# Patient Record
Sex: FEMALE | Race: WHITE | NOT HISPANIC OR LATINO | Employment: UNEMPLOYED | URBAN - NONMETROPOLITAN AREA
[De-identification: names, ages, dates, MRNs, and addresses within clinical notes are randomized per-mention and may not be internally consistent; named-entity substitution may affect disease eponyms.]

---

## 2017-03-14 ENCOUNTER — HOSPITAL ENCOUNTER (EMERGENCY)
Facility: HOSPITAL | Age: 62
Discharge: HOME | End: 2017-03-15
Attending: EMERGENCY MEDICINE | Admitting: EMERGENCY MEDICINE

## 2017-03-14 ENCOUNTER — APPOINTMENT (OUTPATIENT)
Dept: CT IMAGING | Facility: HOSPITAL | Age: 62
End: 2017-03-14

## 2017-03-14 ENCOUNTER — APPOINTMENT (OUTPATIENT)
Dept: GENERAL RADIOLOGY | Facility: HOSPITAL | Age: 62
End: 2017-03-14

## 2017-03-14 DIAGNOSIS — F41.9 ANXIETY: Primary | ICD-10-CM

## 2017-03-14 DIAGNOSIS — R21 RASH AND NONSPECIFIC SKIN ERUPTION: ICD-10-CM

## 2017-03-14 LAB
ALBUMIN SERPL-MCNC: 3.5 G/DL (ref 3.4–4.8)
ALBUMIN/GLOB SERPL: 0.8 G/DL (ref 1.5–2.5)
ALP SERPL-CCNC: 89 U/L (ref 35–104)
ALT SERPL W P-5'-P-CCNC: 21 U/L (ref 10–36)
AMMONIA BLD-SCNC: 19 UMOL/L (ref 11–51)
AMYLASE SERPL-CCNC: 80 U/L (ref 28–100)
ANION GAP SERPL CALCULATED.3IONS-SCNC: 2 MMOL/L (ref 3.6–11.2)
APTT PPP: 26.3 SECONDS (ref 24.4–31)
AST SERPL-CCNC: 37 U/L (ref 10–30)
BASOPHILS # BLD AUTO: 0.02 10*3/MM3 (ref 0–0.3)
BASOPHILS NFR BLD AUTO: 0.3 % (ref 0–2)
BILIRUB SERPL-MCNC: 0.5 MG/DL (ref 0.2–1.8)
BNP SERPL-MCNC: 57 PG/ML (ref 0–100)
BUN BLD-MCNC: 29 MG/DL (ref 7–21)
BUN/CREAT SERPL: 23.8 (ref 7–25)
CALCIUM SPEC-SCNC: 9.4 MG/DL (ref 7.7–10)
CHLORIDE SERPL-SCNC: 96 MMOL/L (ref 99–112)
CK MB SERPL-CCNC: 1.53 NG/ML (ref 0–5)
CK MB SERPL-CCNC: 1.54 NG/ML (ref 0–5)
CK MB SERPL-RTO: 3.8 % (ref 0–3)
CK MB SERPL-RTO: 4.4 % (ref 0–3)
CK SERPL-CCNC: 35 U/L (ref 24–173)
CK SERPL-CCNC: 40 U/L (ref 24–173)
CO2 SERPL-SCNC: 31 MMOL/L (ref 24.3–31.9)
CREAT BLD-MCNC: 1.22 MG/DL (ref 0.43–1.29)
DEPRECATED RDW RBC AUTO: 44.2 FL (ref 37–54)
EOSINOPHIL # BLD AUTO: 0.73 10*3/MM3 (ref 0–0.7)
EOSINOPHIL NFR BLD AUTO: 9.3 % (ref 0–5)
ERYTHROCYTE [DISTWIDTH] IN BLOOD BY AUTOMATED COUNT: 14.5 % (ref 11.5–14.5)
FLUAV AG NPH QL: NEGATIVE
FLUBV AG NPH QL IA: NEGATIVE
GFR SERPL CREATININE-BSD FRML MDRD: 45 ML/MIN/1.73
GLOBULIN UR ELPH-MCNC: 4.2 GM/DL
GLUCOSE BLD-MCNC: 244 MG/DL (ref 70–110)
HCT VFR BLD AUTO: 28.8 % (ref 37–47)
HGB BLD-MCNC: 9.8 G/DL (ref 12–16)
IMM GRANULOCYTES # BLD: 0.03 10*3/MM3 (ref 0–0.03)
IMM GRANULOCYTES NFR BLD: 0.4 % (ref 0–0.5)
INR PPP: 0.91 (ref 0.8–1.1)
LIPASE SERPL-CCNC: 88 U/L (ref 13–60)
LYMPHOCYTES # BLD AUTO: 1.4 10*3/MM3 (ref 1–3)
LYMPHOCYTES NFR BLD AUTO: 17.8 % (ref 21–51)
MCH RBC QN AUTO: 29.2 PG (ref 27–33)
MCHC RBC AUTO-ENTMCNC: 34 G/DL (ref 33–37)
MCV RBC AUTO: 85.7 FL (ref 80–94)
MONOCYTES # BLD AUTO: 0.72 10*3/MM3 (ref 0.1–0.9)
MONOCYTES NFR BLD AUTO: 9.1 % (ref 0–10)
MYOGLOBIN SERPL-MCNC: 43 NG/ML (ref 0–109)
NEUTROPHILS # BLD AUTO: 4.98 10*3/MM3 (ref 1.4–6.5)
NEUTROPHILS NFR BLD AUTO: 63.1 % (ref 30–70)
OSMOLALITY SERPL CALC.SUM OF ELEC: 272.9 MOSM/KG (ref 273–305)
PLATELET # BLD AUTO: 151 10*3/MM3 (ref 130–400)
PMV BLD AUTO: 9.6 FL (ref 6–10)
POTASSIUM BLD-SCNC: 3.8 MMOL/L (ref 3.5–5.3)
PROT SERPL-MCNC: 7.7 G/DL (ref 6–8)
PROTHROMBIN TIME: 10 SECONDS (ref 9.8–11.9)
RBC # BLD AUTO: 3.36 10*6/MM3 (ref 4.2–5.4)
SODIUM BLD-SCNC: 129 MMOL/L (ref 135–153)
TROPONIN I SERPL-MCNC: <0.006 NG/ML
TROPONIN I SERPL-MCNC: <0.006 NG/ML
WBC NRBC COR # BLD: 7.88 10*3/MM3 (ref 4.5–12.5)

## 2017-03-14 PROCEDURE — 82553 CREATINE MB FRACTION: CPT | Performed by: PHYSICIAN ASSISTANT

## 2017-03-14 PROCEDURE — 83690 ASSAY OF LIPASE: CPT | Performed by: PHYSICIAN ASSISTANT

## 2017-03-14 PROCEDURE — 80053 COMPREHEN METABOLIC PANEL: CPT | Performed by: EMERGENCY MEDICINE

## 2017-03-14 PROCEDURE — 0 IOPAMIDOL 61 % SOLUTION: Performed by: EMERGENCY MEDICINE

## 2017-03-14 PROCEDURE — 84484 ASSAY OF TROPONIN QUANT: CPT | Performed by: PHYSICIAN ASSISTANT

## 2017-03-14 PROCEDURE — 25010000002 MORPHINE PER 10 MG: Performed by: EMERGENCY MEDICINE

## 2017-03-14 PROCEDURE — 93010 ELECTROCARDIOGRAM REPORT: CPT | Performed by: INTERNAL MEDICINE

## 2017-03-14 PROCEDURE — 74177 CT ABD & PELVIS W/CONTRAST: CPT | Performed by: RADIOLOGY

## 2017-03-14 PROCEDURE — 99284 EMERGENCY DEPT VISIT MOD MDM: CPT

## 2017-03-14 PROCEDURE — 71020 HC CHEST PA AND LATERAL: CPT

## 2017-03-14 PROCEDURE — 87804 INFLUENZA ASSAY W/OPTIC: CPT | Performed by: PHYSICIAN ASSISTANT

## 2017-03-14 PROCEDURE — 83874 ASSAY OF MYOGLOBIN: CPT | Performed by: PHYSICIAN ASSISTANT

## 2017-03-14 PROCEDURE — 85730 THROMBOPLASTIN TIME PARTIAL: CPT | Performed by: PHYSICIAN ASSISTANT

## 2017-03-14 PROCEDURE — 96361 HYDRATE IV INFUSION ADD-ON: CPT

## 2017-03-14 PROCEDURE — 83880 ASSAY OF NATRIURETIC PEPTIDE: CPT | Performed by: EMERGENCY MEDICINE

## 2017-03-14 PROCEDURE — 85025 COMPLETE CBC W/AUTO DIFF WBC: CPT | Performed by: EMERGENCY MEDICINE

## 2017-03-14 PROCEDURE — 82140 ASSAY OF AMMONIA: CPT | Performed by: PHYSICIAN ASSISTANT

## 2017-03-14 PROCEDURE — 96374 THER/PROPH/DIAG INJ IV PUSH: CPT

## 2017-03-14 PROCEDURE — 84484 ASSAY OF TROPONIN QUANT: CPT | Performed by: EMERGENCY MEDICINE

## 2017-03-14 PROCEDURE — 71020 XR CHEST 2 VW: CPT | Performed by: RADIOLOGY

## 2017-03-14 PROCEDURE — 85610 PROTHROMBIN TIME: CPT | Performed by: PHYSICIAN ASSISTANT

## 2017-03-14 PROCEDURE — 74177 CT ABD & PELVIS W/CONTRAST: CPT

## 2017-03-14 PROCEDURE — 82150 ASSAY OF AMYLASE: CPT | Performed by: PHYSICIAN ASSISTANT

## 2017-03-14 PROCEDURE — 93005 ELECTROCARDIOGRAM TRACING: CPT

## 2017-03-14 PROCEDURE — 82550 ASSAY OF CK (CPK): CPT | Performed by: PHYSICIAN ASSISTANT

## 2017-03-14 RX ORDER — SODIUM CHLORIDE 0.9 % (FLUSH) 0.9 %
10 SYRINGE (ML) INJECTION AS NEEDED
Status: DISCONTINUED | OUTPATIENT
Start: 2017-03-14 | End: 2017-03-15 | Stop reason: HOSPADM

## 2017-03-14 RX ADMIN — IOPAMIDOL 100 ML: 612 INJECTION, SOLUTION INTRAVENOUS at 23:12

## 2017-03-14 RX ADMIN — MORPHINE SULFATE 4 MG: 4 INJECTION, SOLUTION INTRAMUSCULAR; INTRAVENOUS at 22:27

## 2017-03-14 RX ADMIN — SODIUM CHLORIDE 500 ML: 9 INJECTION, SOLUTION INTRAVENOUS at 20:53

## 2017-03-14 NOTE — ED PROVIDER NOTES
"Subjective   HPI Comments: Patient presents to the ED with complaints of being \"sick\" since yesterday. Patient states she is experiencing shortness of breath.  Patient states she had pneumonia twice since November of last year and just got out of the nursing home. Patient states that while in the nursing home she got an itchy rash all over her body related to their detergent.  Patient states the rash is still bothering her.  Patient also states that she has hepatitis and is in liver failure.  Patient states her abdomen has also been swelling.  She reports nausea, but no vomiting or diarrhea.  Patient states since yesterday she has been feeling weak with body aches.  Patient reports increased anxiety. She denies SI/HI, AVH.     Patient is a 61 y.o. female presenting with shortness of breath.   History provided by:  Patient   used: No    Shortness of Breath   Severity:  Mild  Onset quality:  Gradual  Duration:  2 days  Timing:  Constant  Progression:  Worsening  Chronicity:  New  Relieved by:  Nothing  Worsened by:  Nothing  Ineffective treatments:  None tried  Associated symptoms: rash        Review of Systems   Constitutional: Negative.    HENT: Negative.    Eyes: Negative.    Respiratory: Positive for shortness of breath.    Cardiovascular: Negative.    Gastrointestinal: Negative.    Endocrine: Negative.    Genitourinary: Negative.    Musculoskeletal: Negative.    Skin: Positive for rash.   Allergic/Immunologic: Negative.    Neurological: Negative.    Hematological: Negative.    Psychiatric/Behavioral: Negative.    All other systems reviewed and are negative.      Past Medical History   Diagnosis Date   • CAD (coronary artery disease)    • Cardiac disorder    • Diabetes mellitus    • Glaucoma    • Hypertension    • Infectious viral hepatitis        No Known Allergies    Past Surgical History   Procedure Laterality Date   • Hernia repair     • Hysterectomy     • Kidney surgery     • Abdominal " wall mesh  removal         Family History   Problem Relation Age of Onset   • Heart disease Other    • Diabetes Other    • Hypertension Other    • Cancer Other        Social History     Social History   • Marital status:      Spouse name: N/A   • Number of children: N/A   • Years of education: N/A     Social History Main Topics   • Smoking status: Current Every Day Smoker   • Smokeless tobacco: None   • Alcohol use No   • Drug use: No   • Sexual activity: Not Asked     Other Topics Concern   • None     Social History Narrative           Objective   Physical Exam   Constitutional: She is oriented to person, place, and time. She appears well-developed and well-nourished. No distress.   HENT:   Head: Normocephalic and atraumatic.   Right Ear: External ear normal.   Left Ear: External ear normal.   Nose: Nose normal.   Mouth/Throat: Oropharynx is clear and moist. No oropharyngeal exudate.   Eyes: EOM are normal. Pupils are equal, round, and reactive to light. Right eye exhibits no discharge. Left eye exhibits no discharge. No scleral icterus.   Neck: Normal range of motion. Neck supple. No JVD present. No tracheal deviation present. No thyromegaly present.   Cardiovascular: Normal rate, regular rhythm, normal heart sounds and intact distal pulses.  Exam reveals no gallop and no friction rub.    No murmur heard.  Pulmonary/Chest: Effort normal and breath sounds normal. No stridor. No respiratory distress. She has no wheezes. She has no rales. She exhibits no tenderness.   Abdominal: Soft. Bowel sounds are normal. She exhibits distension. She exhibits no mass. There is no tenderness. There is no rebound and no guarding. No hernia.   Musculoskeletal: Normal range of motion. She exhibits no edema, tenderness or deformity.   Lymphadenopathy:     She has no cervical adenopathy.   Neurological: She is alert and oriented to person, place, and time. She displays normal reflexes. No cranial nerve deficit. Coordination  normal.   Skin: Skin is warm and dry. Rash noted. She is not diaphoretic. No erythema. No pallor.   Nursing note and vitals reviewed.      Procedures         ED Course  ED Course   Value Comment By Time   ECG 12 Lead 1821- Reviewed by Dr. Wise, rate 74, sinus rhythm, no ischemia  HOLLIE Magaña 03/14 6894    Discussed case with Dr. Ace.  HOLLIE Magaña 03/14 8163    Discussed results with patient. Instructed PCP f/u.  Discussed sx that would warrant immediate return to the ED. Pt states she is feeling better and ready to go home.  HOLLIE Magaña 03/14 234                  MDM    Final diagnoses:   Anxiety   Rash and nonspecific skin eruption            HOLLIE Magaña  03/14/17 6667

## 2017-03-15 VITALS
RESPIRATION RATE: 20 BRPM | OXYGEN SATURATION: 100 % | DIASTOLIC BLOOD PRESSURE: 82 MMHG | HEIGHT: 64 IN | SYSTOLIC BLOOD PRESSURE: 164 MMHG | HEART RATE: 69 BPM | TEMPERATURE: 97.6 F | BODY MASS INDEX: 22.2 KG/M2 | WEIGHT: 130 LBS

## 2017-03-15 LAB
HOLD SPECIMEN: NORMAL
HOLD SPECIMEN: NORMAL
WHOLE BLOOD HOLD SPECIMEN: NORMAL
WHOLE BLOOD HOLD SPECIMEN: NORMAL

## 2017-06-22 ENCOUNTER — TELEPHONE (OUTPATIENT)
Dept: SURGERY | Facility: CLINIC | Age: 62
End: 2017-06-22

## 2017-06-22 NOTE — TELEPHONE ENCOUNTER
Lily called and stated that she was see in [facility] last night 06/21/2017 and they wanted her to follow up with us within 24 hrs. Patient called when we was finishing up with clinic. I spoke with Kelly and she stated that we could see her today that we needed her here within 30 minutes. Pt lives in [place]. Pt stated that she could not do that, she did not have a ride. I told her our next clinic day was on Tuesday and she said that was fine to make her a appointment and she would arrange a ride. I advised her that when she is seen in the ER and they advise her to get into another doctor the next day to try and contact the office that morning so she will have time to arrange a ride.

## 2017-07-28 ENCOUNTER — TRANSCRIBE ORDERS (OUTPATIENT)
Dept: ADMINISTRATIVE | Facility: HOSPITAL | Age: 62
End: 2017-07-28

## 2017-07-28 DIAGNOSIS — Z12.31 VISIT FOR SCREENING MAMMOGRAM: Primary | ICD-10-CM

## 2017-08-29 ENCOUNTER — HOSPITAL ENCOUNTER (EMERGENCY)
Facility: HOSPITAL | Age: 62
Discharge: HOME | End: 2017-08-30
Attending: EMERGENCY MEDICINE | Admitting: EMERGENCY MEDICINE

## 2017-08-29 ENCOUNTER — APPOINTMENT (OUTPATIENT)
Dept: CT IMAGING | Facility: HOSPITAL | Age: 62
End: 2017-08-29

## 2017-08-29 ENCOUNTER — APPOINTMENT (OUTPATIENT)
Dept: GENERAL RADIOLOGY | Facility: HOSPITAL | Age: 62
End: 2017-08-29

## 2017-08-29 DIAGNOSIS — N30.00 ACUTE CYSTITIS WITHOUT HEMATURIA: Primary | ICD-10-CM

## 2017-08-29 DIAGNOSIS — K74.69 OTHER CIRRHOSIS OF LIVER (HCC): ICD-10-CM

## 2017-08-29 DIAGNOSIS — R41.82 ALTERED MENTAL STATUS, UNSPECIFIED ALTERED MENTAL STATUS TYPE: ICD-10-CM

## 2017-08-29 LAB
6-ACETYL MORPHINE: NEGATIVE
A-A DO2: 11.4 MMHG (ref 0–300)
ALBUMIN SERPL-MCNC: 3.4 G/DL (ref 3.4–4.8)
ALBUMIN/GLOB SERPL: 0.9 G/DL (ref 1.5–2.5)
ALP SERPL-CCNC: 84 U/L (ref 35–104)
ALT SERPL W P-5'-P-CCNC: 22 U/L (ref 10–36)
AMMONIA BLD-SCNC: 14 UMOL/L (ref 11–51)
AMPHET+METHAMPHET UR QL: NEGATIVE
ANION GAP SERPL CALCULATED.3IONS-SCNC: 8.2 MMOL/L (ref 3.6–11.2)
APTT PPP: 33.8 SECONDS (ref 23.8–36.1)
ARTERIAL PATENCY WRIST A: ABNORMAL
AST SERPL-CCNC: 32 U/L (ref 10–30)
ATMOSPHERIC PRESS: 728 MMHG
BACTERIA UR QL AUTO: ABNORMAL /HPF
BARBITURATES UR QL SCN: NEGATIVE
BASE EXCESS BLDA CALC-SCNC: 0.7 MMOL/L
BASOPHILS # BLD AUTO: 0.04 10*3/MM3 (ref 0–0.3)
BASOPHILS NFR BLD AUTO: 0.6 % (ref 0–2)
BDY SITE: ABNORMAL
BENZODIAZ UR QL SCN: NEGATIVE
BILIRUB SERPL-MCNC: 0.3 MG/DL (ref 0.2–1.8)
BILIRUB UR QL STRIP: NEGATIVE
BODY TEMPERATURE: 98.6 C
BUN BLD-MCNC: 43 MG/DL (ref 7–21)
BUN/CREAT SERPL: 40.6 (ref 7–25)
BUPRENORPHINE SERPL-MCNC: NEGATIVE NG/ML
CALCIUM SPEC-SCNC: 9.5 MG/DL (ref 7.7–10)
CANNABINOIDS SERPL QL: NEGATIVE
CHLORIDE SERPL-SCNC: 101 MMOL/L (ref 99–112)
CK MB SERPL-CCNC: 2.08 NG/ML (ref 0–5)
CK MB SERPL-RTO: 9 % (ref 0–3)
CK SERPL-CCNC: 23 U/L (ref 24–173)
CLARITY UR: ABNORMAL
CO2 SERPL-SCNC: 25.8 MMOL/L (ref 24.3–31.9)
COCAINE UR QL: NEGATIVE
COHGB MFR BLD: 1.6 % (ref 0–5)
COLOR UR: YELLOW
CREAT BLD-MCNC: 1.06 MG/DL (ref 0.43–1.29)
D-LACTATE SERPL-SCNC: 1 MMOL/L (ref 0.5–2)
DEPRECATED RDW RBC AUTO: 48.7 FL (ref 37–54)
EOSINOPHIL # BLD AUTO: 1.39 10*3/MM3 (ref 0–0.7)
EOSINOPHIL NFR BLD AUTO: 19.6 % (ref 0–5)
ERYTHROCYTE [DISTWIDTH] IN BLOOD BY AUTOMATED COUNT: 15.3 % (ref 11.5–14.5)
GFR SERPL CREATININE-BSD FRML MDRD: 52 ML/MIN/1.73
GLOBULIN UR ELPH-MCNC: 3.6 GM/DL
GLUCOSE BLD-MCNC: 203 MG/DL (ref 70–110)
GLUCOSE BLDC GLUCOMTR-MCNC: 213 MG/DL (ref 70–130)
GLUCOSE UR STRIP-MCNC: NEGATIVE MG/DL
HCO3 BLDA-SCNC: 22.3 MMOL/L (ref 22–26)
HCT VFR BLD AUTO: 26.9 % (ref 37–47)
HCT VFR BLD CALC: 26 % (ref 37–47)
HGB BLD-MCNC: 8.9 G/DL (ref 12–16)
HGB BLDA-MCNC: 9 G/DL (ref 12–16)
HGB UR QL STRIP.AUTO: ABNORMAL
HOROWITZ INDEX BLD+IHG-RTO: 21 %
HYALINE CASTS UR QL AUTO: ABNORMAL /LPF
IMM GRANULOCYTES # BLD: 0.06 10*3/MM3 (ref 0–0.03)
IMM GRANULOCYTES NFR BLD: 0.8 % (ref 0–0.5)
INR PPP: 0.96 (ref 0.9–1.1)
KETONES UR QL STRIP: ABNORMAL
LEUKOCYTE ESTERASE UR QL STRIP.AUTO: ABNORMAL
LYMPHOCYTES # BLD AUTO: 1.01 10*3/MM3 (ref 1–3)
LYMPHOCYTES NFR BLD AUTO: 14.3 % (ref 21–51)
MAGNESIUM SERPL-MCNC: 1.8 MG/DL (ref 1.7–2.6)
MCH RBC QN AUTO: 29.4 PG (ref 27–33)
MCHC RBC AUTO-ENTMCNC: 33.1 G/DL (ref 33–37)
MCV RBC AUTO: 88.8 FL (ref 80–94)
METHADONE UR QL SCN: NEGATIVE
METHGB BLD QL: 0.4 % (ref 0–3)
MODALITY: ABNORMAL
MONOCYTES # BLD AUTO: 0.47 10*3/MM3 (ref 0.1–0.9)
MONOCYTES NFR BLD AUTO: 6.6 % (ref 0–10)
NEUTROPHILS # BLD AUTO: 4.11 10*3/MM3 (ref 1.4–6.5)
NEUTROPHILS NFR BLD AUTO: 58.1 % (ref 30–70)
NITRITE UR QL STRIP: NEGATIVE
OPIATES UR QL: NEGATIVE
OSMOLALITY SERPL CALC.SUM OF ELEC: 286.7 MOSM/KG (ref 273–305)
OXYCODONE UR QL SCN: NEGATIVE
OXYHGB MFR BLDV: 96 % (ref 85–100)
PCO2 BLDA: 25.4 MM HG (ref 35–45)
PCP UR QL SCN: NEGATIVE
PH BLDA: 7.56 PH UNITS (ref 7.35–7.45)
PH UR STRIP.AUTO: 7 [PH] (ref 5–8)
PHOSPHATE SERPL-MCNC: 3.3 MG/DL (ref 2.7–4.5)
PLATELET # BLD AUTO: 130 10*3/MM3 (ref 130–400)
PMV BLD AUTO: 9.3 FL (ref 6–10)
PO2 BLDA: 101.2 MM HG (ref 80–100)
POTASSIUM BLD-SCNC: 4.7 MMOL/L (ref 3.5–5.3)
PROT SERPL-MCNC: 7 G/DL (ref 6–8)
PROT UR QL STRIP: ABNORMAL
PROTHROMBIN TIME: 12.9 SECONDS (ref 11–15.4)
RBC # BLD AUTO: 3.03 10*6/MM3 (ref 4.2–5.4)
RBC # UR: ABNORMAL /HPF
REF LAB TEST METHOD: ABNORMAL
SAO2 % BLDCOA: 98 % (ref 90–100)
SODIUM BLD-SCNC: 135 MMOL/L (ref 135–153)
SP GR UR STRIP: 1.02 (ref 1–1.03)
SQUAMOUS #/AREA URNS HPF: ABNORMAL /HPF
T4 SERPL-MCNC: 11.2 MCG/DL (ref 4.5–10.9)
TROPONIN I SERPL-MCNC: <0.006 NG/ML
TSH SERPL DL<=0.05 MIU/L-ACNC: 2.3 MIU/ML (ref 0.55–4.78)
UROBILINOGEN UR QL STRIP: ABNORMAL
WBC NRBC COR # BLD: 7.08 10*3/MM3 (ref 4.5–12.5)
WBC UR QL AUTO: ABNORMAL /HPF

## 2017-08-29 PROCEDURE — 82550 ASSAY OF CK (CPK): CPT | Performed by: EMERGENCY MEDICINE

## 2017-08-29 PROCEDURE — 83735 ASSAY OF MAGNESIUM: CPT | Performed by: EMERGENCY MEDICINE

## 2017-08-29 PROCEDURE — 74177 CT ABD & PELVIS W/CONTRAST: CPT

## 2017-08-29 PROCEDURE — 36600 WITHDRAWAL OF ARTERIAL BLOOD: CPT | Performed by: EMERGENCY MEDICINE

## 2017-08-29 PROCEDURE — 84100 ASSAY OF PHOSPHORUS: CPT | Performed by: EMERGENCY MEDICINE

## 2017-08-29 PROCEDURE — 72125 CT NECK SPINE W/O DYE: CPT | Performed by: RADIOLOGY

## 2017-08-29 PROCEDURE — 85730 THROMBOPLASTIN TIME PARTIAL: CPT | Performed by: EMERGENCY MEDICINE

## 2017-08-29 PROCEDURE — 72125 CT NECK SPINE W/O DYE: CPT

## 2017-08-29 PROCEDURE — 87186 SC STD MICRODIL/AGAR DIL: CPT | Performed by: EMERGENCY MEDICINE

## 2017-08-29 PROCEDURE — 80053 COMPREHEN METABOLIC PANEL: CPT | Performed by: EMERGENCY MEDICINE

## 2017-08-29 PROCEDURE — 80307 DRUG TEST PRSMV CHEM ANLYZR: CPT | Performed by: EMERGENCY MEDICINE

## 2017-08-29 PROCEDURE — 87040 BLOOD CULTURE FOR BACTERIA: CPT | Performed by: EMERGENCY MEDICINE

## 2017-08-29 PROCEDURE — 82962 GLUCOSE BLOOD TEST: CPT

## 2017-08-29 PROCEDURE — 85025 COMPLETE CBC W/AUTO DIFF WBC: CPT | Performed by: EMERGENCY MEDICINE

## 2017-08-29 PROCEDURE — 82375 ASSAY CARBOXYHB QUANT: CPT | Performed by: EMERGENCY MEDICINE

## 2017-08-29 PROCEDURE — 81001 URINALYSIS AUTO W/SCOPE: CPT | Performed by: EMERGENCY MEDICINE

## 2017-08-29 PROCEDURE — 84436 ASSAY OF TOTAL THYROXINE: CPT | Performed by: EMERGENCY MEDICINE

## 2017-08-29 PROCEDURE — 84484 ASSAY OF TROPONIN QUANT: CPT | Performed by: EMERGENCY MEDICINE

## 2017-08-29 PROCEDURE — 51702 INSERT TEMP BLADDER CATH: CPT

## 2017-08-29 PROCEDURE — 74177 CT ABD & PELVIS W/CONTRAST: CPT | Performed by: RADIOLOGY

## 2017-08-29 PROCEDURE — 25010000002 PIPERACILLIN-TAZOBACTAM: Performed by: EMERGENCY MEDICINE

## 2017-08-29 PROCEDURE — 70450 CT HEAD/BRAIN W/O DYE: CPT | Performed by: RADIOLOGY

## 2017-08-29 PROCEDURE — 96361 HYDRATE IV INFUSION ADD-ON: CPT

## 2017-08-29 PROCEDURE — 93005 ELECTROCARDIOGRAM TRACING: CPT | Performed by: EMERGENCY MEDICINE

## 2017-08-29 PROCEDURE — 83605 ASSAY OF LACTIC ACID: CPT | Performed by: EMERGENCY MEDICINE

## 2017-08-29 PROCEDURE — 82140 ASSAY OF AMMONIA: CPT | Performed by: EMERGENCY MEDICINE

## 2017-08-29 PROCEDURE — 96375 TX/PRO/DX INJ NEW DRUG ADDON: CPT

## 2017-08-29 PROCEDURE — 71010 XR CHEST 1 VW: CPT | Performed by: RADIOLOGY

## 2017-08-29 PROCEDURE — 0 IOPAMIDOL 61 % SOLUTION: Performed by: EMERGENCY MEDICINE

## 2017-08-29 PROCEDURE — 25010000002 MIDAZOLAM PER 1 MG: Performed by: EMERGENCY MEDICINE

## 2017-08-29 PROCEDURE — 83050 HGB METHEMOGLOBIN QUAN: CPT | Performed by: EMERGENCY MEDICINE

## 2017-08-29 PROCEDURE — 87086 URINE CULTURE/COLONY COUNT: CPT | Performed by: EMERGENCY MEDICINE

## 2017-08-29 PROCEDURE — 87077 CULTURE AEROBIC IDENTIFY: CPT | Performed by: EMERGENCY MEDICINE

## 2017-08-29 PROCEDURE — 82805 BLOOD GASES W/O2 SATURATION: CPT | Performed by: EMERGENCY MEDICINE

## 2017-08-29 PROCEDURE — 96365 THER/PROPH/DIAG IV INF INIT: CPT

## 2017-08-29 PROCEDURE — 93010 ELECTROCARDIOGRAM REPORT: CPT | Performed by: INTERNAL MEDICINE

## 2017-08-29 PROCEDURE — 71010 HC CHEST PA OR AP: CPT

## 2017-08-29 PROCEDURE — 82553 CREATINE MB FRACTION: CPT | Performed by: EMERGENCY MEDICINE

## 2017-08-29 PROCEDURE — 84443 ASSAY THYROID STIM HORMONE: CPT | Performed by: EMERGENCY MEDICINE

## 2017-08-29 PROCEDURE — 99285 EMERGENCY DEPT VISIT HI MDM: CPT

## 2017-08-29 PROCEDURE — 85610 PROTHROMBIN TIME: CPT | Performed by: EMERGENCY MEDICINE

## 2017-08-29 PROCEDURE — 70450 CT HEAD/BRAIN W/O DYE: CPT

## 2017-08-29 RX ORDER — SODIUM CHLORIDE 0.9 % (FLUSH) 0.9 %
10 SYRINGE (ML) INJECTION AS NEEDED
Status: DISCONTINUED | OUTPATIENT
Start: 2017-08-29 | End: 2017-08-30 | Stop reason: HOSPADM

## 2017-08-29 RX ORDER — MIDAZOLAM HYDROCHLORIDE 1 MG/ML
2 INJECTION INTRAMUSCULAR; INTRAVENOUS ONCE
Status: COMPLETED | OUTPATIENT
Start: 2017-08-29 | End: 2017-08-29

## 2017-08-29 RX ORDER — SODIUM CHLORIDE 9 MG/ML
125 INJECTION, SOLUTION INTRAVENOUS CONTINUOUS
Status: DISCONTINUED | OUTPATIENT
Start: 2017-08-29 | End: 2017-08-30 | Stop reason: HOSPADM

## 2017-08-29 RX ADMIN — IOPAMIDOL 100 ML: 612 INJECTION, SOLUTION INTRAVENOUS at 23:08

## 2017-08-29 RX ADMIN — SODIUM CHLORIDE 1000 ML: 9 INJECTION, SOLUTION INTRAVENOUS at 21:32

## 2017-08-29 RX ADMIN — TAZOBACTAM SODIUM AND PIPERACILLIN SODIUM 4.5 G: .5; 4 INJECTION, POWDER, LYOPHILIZED, FOR SOLUTION INTRAVENOUS at 22:03

## 2017-08-29 RX ADMIN — SODIUM CHLORIDE 125 ML/HR: 9 INJECTION, SOLUTION INTRAVENOUS at 22:30

## 2017-08-29 RX ADMIN — MIDAZOLAM HYDROCHLORIDE 2 MG: 1 INJECTION, SOLUTION INTRAMUSCULAR; INTRAVENOUS at 22:48

## 2017-08-30 VITALS
HEART RATE: 79 BPM | DIASTOLIC BLOOD PRESSURE: 86 MMHG | WEIGHT: 144.38 LBS | SYSTOLIC BLOOD PRESSURE: 148 MMHG | OXYGEN SATURATION: 98 % | BODY MASS INDEX: 23.2 KG/M2 | TEMPERATURE: 97.9 F | HEIGHT: 66 IN | RESPIRATION RATE: 16 BRPM

## 2017-09-01 LAB — BACTERIA SPEC AEROBE CULT: ABNORMAL

## 2017-09-03 ENCOUNTER — HOSPITAL ENCOUNTER (EMERGENCY)
Facility: HOSPITAL | Age: 62
Discharge: NURSING, REHAB OR LONG-TERM CARE | End: 2017-09-03
Attending: FAMILY MEDICINE | Admitting: FAMILY MEDICINE

## 2017-09-03 ENCOUNTER — APPOINTMENT (OUTPATIENT)
Dept: GENERAL RADIOLOGY | Facility: HOSPITAL | Age: 62
End: 2017-09-03

## 2017-09-03 ENCOUNTER — APPOINTMENT (OUTPATIENT)
Dept: CT IMAGING | Facility: HOSPITAL | Age: 62
End: 2017-09-03

## 2017-09-03 VITALS
DIASTOLIC BLOOD PRESSURE: 85 MMHG | HEIGHT: 66 IN | TEMPERATURE: 98 F | OXYGEN SATURATION: 99 % | BODY MASS INDEX: 23.78 KG/M2 | RESPIRATION RATE: 18 BRPM | SYSTOLIC BLOOD PRESSURE: 151 MMHG | HEART RATE: 77 BPM | WEIGHT: 148 LBS

## 2017-09-03 DIAGNOSIS — N30.01 ACUTE CYSTITIS WITH HEMATURIA: ICD-10-CM

## 2017-09-03 DIAGNOSIS — S51.812A SKIN TEAR OF FOREARM WITHOUT COMPLICATION, LEFT, INITIAL ENCOUNTER: ICD-10-CM

## 2017-09-03 DIAGNOSIS — D50.9 IRON DEFICIENCY ANEMIA, UNSPECIFIED IRON DEFICIENCY ANEMIA TYPE: ICD-10-CM

## 2017-09-03 DIAGNOSIS — S70.01XA CONTUSION OF RIGHT HIP, INITIAL ENCOUNTER: ICD-10-CM

## 2017-09-03 DIAGNOSIS — S20.229A BACK CONTUSION, UNSPECIFIED LATERALITY, INITIAL ENCOUNTER: ICD-10-CM
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
## 2017-09-03 LAB
6-ACETYL MORPHINE: NEGATIVE
ALBUMIN SERPL-MCNC: 3.6 G/DL (ref 3.4–4.8)
ALBUMIN/GLOB SERPL: 1 G/DL (ref 1.5–2.5)
ALP SERPL-CCNC: 89 U/L (ref 35–104)
ALT SERPL W P-5'-P-CCNC: 26 U/L (ref 10–36)
AMMONIA BLD-SCNC: 14 UMOL/L (ref 11–51)
AMPHET+METHAMPHET UR QL: NEGATIVE
ANION GAP SERPL CALCULATED.3IONS-SCNC: 7.2 MMOL/L (ref 3.6–11.2)
AST SERPL-CCNC: 34 U/L (ref 10–30)
BACTERIA SPEC AEROBE CULT: NORMAL
BACTERIA SPEC AEROBE CULT: NORMAL
BACTERIA UR QL AUTO: ABNORMAL /HPF
BARBITURATES UR QL SCN: NEGATIVE
BENZODIAZ UR QL SCN: NEGATIVE
BILIRUB SERPL-MCNC: 0.3 MG/DL (ref 0.2–1.8)
BILIRUB UR QL STRIP: NEGATIVE
BUN BLD-MCNC: 28 MG/DL (ref 7–21)
BUN/CREAT SERPL: 23.1 (ref 7–25)
BUPRENORPHINE SERPL-MCNC: NEGATIVE NG/ML
CALCIUM SPEC-SCNC: 9.5 MG/DL (ref 7.7–10)
CANNABINOIDS SERPL QL: NEGATIVE
CHLORIDE SERPL-SCNC: 106 MMOL/L (ref 99–112)
CK MB SERPL-CCNC: 1.44 NG/ML (ref 0–5)
CK MB SERPL-RTO: 3.8 % (ref 0–3)
CK SERPL-CCNC: 38 U/L (ref 24–173)
CLARITY UR: ABNORMAL
CO2 SERPL-SCNC: 24.8 MMOL/L (ref 24.3–31.9)
COCAINE UR QL: NEGATIVE
COLOR UR: YELLOW
CREAT BLD-MCNC: 1.21 MG/DL (ref 0.43–1.29)
DEPRECATED RDW RBC AUTO: 48.2 FL (ref 37–54)
EOSINOPHIL # BLD MANUAL: 2.89 10*3/MM3 (ref 0–0.7)
EOSINOPHIL NFR BLD MANUAL: 35 % (ref 0–5)
ERYTHROCYTE [DISTWIDTH] IN BLOOD BY AUTOMATED COUNT: 15.3 % (ref 11.5–14.5)
GFR SERPL CREATININE-BSD FRML MDRD: 45 ML/MIN/1.73
GLOBULIN UR ELPH-MCNC: 3.6 GM/DL
GLUCOSE BLD-MCNC: 199 MG/DL (ref 70–110)
GLUCOSE BLDC GLUCOMTR-MCNC: 217 MG/DL (ref 70–130)
GLUCOSE UR STRIP-MCNC: NEGATIVE MG/DL
HCT VFR BLD AUTO: 24.7 % (ref 37–47)
HGB BLD-MCNC: 8.1 G/DL (ref 12–16)
HGB UR QL STRIP.AUTO: ABNORMAL
HYPOCHROMIA BLD QL: ABNORMAL
KETONES UR QL STRIP: NEGATIVE
LEUKOCYTE ESTERASE UR QL STRIP.AUTO: ABNORMAL
LIPASE SERPL-CCNC: 95 U/L (ref 13–60)
LYMPHOCYTES # BLD MANUAL: 1.16 10*3/MM3 (ref 1–3)
LYMPHOCYTES NFR BLD MANUAL: 14 % (ref 21–51)
LYMPHOCYTES NFR BLD MANUAL: 5 % (ref 0–10)
MCH RBC QN AUTO: 29.5 PG (ref 27–33)
MCHC RBC AUTO-ENTMCNC: 32.8 G/DL (ref 33–37)
MCV RBC AUTO: 89.8 FL (ref 80–94)
METAMYELOCYTES NFR BLD MANUAL: 3 % (ref 0–0)
METHADONE UR QL SCN: NEGATIVE
MONOCYTES # BLD AUTO: 0.41 10*3/MM3 (ref 0.1–0.9)
NEUTROPHILS # BLD AUTO: 3.55 10*3/MM3 (ref 1.4–6.5)
NEUTROPHILS NFR BLD MANUAL: 43 % (ref 30–70)
NITRITE UR QL STRIP: NEGATIVE
OPIATES UR QL: NEGATIVE
OSMOLALITY SERPL CALC.SUM OF ELEC: 286.7 MOSM/KG (ref 273–305)
OXYCODONE UR QL SCN: NEGATIVE
PCP UR QL SCN: NEGATIVE
PH UR STRIP.AUTO: 5.5 [PH] (ref 5–8)
PLATELET # BLD AUTO: 121 10*3/MM3 (ref 130–400)
PMV BLD AUTO: 9.1 FL (ref 6–10)
POTASSIUM BLD-SCNC: 4.2 MMOL/L (ref 3.5–5.3)
PROT SERPL-MCNC: 7.2 G/DL (ref 6–8)
PROT UR QL STRIP: ABNORMAL
RBC # BLD AUTO: 2.75 10*6/MM3 (ref 4.2–5.4)
RBC # UR: ABNORMAL /HPF
REF LAB TEST METHOD: ABNORMAL
SCAN SLIDE: NORMAL
SMALL PLATELETS BLD QL SMEAR: ABNORMAL
SODIUM BLD-SCNC: 138 MMOL/L (ref 135–153)
SP GR UR STRIP: 1.01 (ref 1–1.03)
SQUAMOUS #/AREA URNS HPF: ABNORMAL /HPF
TROPONIN I SERPL-MCNC: <0.006 NG/ML
UROBILINOGEN UR QL STRIP: ABNORMAL
WBC NRBC COR # BLD: 8.26 10*3/MM3 (ref 4.5–12.5)
WBC UR QL AUTO: ABNORMAL /HPF
YEAST URNS QL MICRO: ABNORMAL /HPF

## 2017-09-03 PROCEDURE — 72170 X-RAY EXAM OF PELVIS: CPT | Performed by: RADIOLOGY

## 2017-09-03 PROCEDURE — 70450 CT HEAD/BRAIN W/O DYE: CPT | Performed by: RADIOLOGY

## 2017-09-03 PROCEDURE — 72131 CT LUMBAR SPINE W/O DYE: CPT

## 2017-09-03 PROCEDURE — 72170 X-RAY EXAM OF PELVIS: CPT

## 2017-09-03 PROCEDURE — 80307 DRUG TEST PRSMV CHEM ANLYZR: CPT | Performed by: FAMILY MEDICINE

## 2017-09-03 PROCEDURE — 82553 CREATINE MB FRACTION: CPT | Performed by: FAMILY MEDICINE

## 2017-09-03 PROCEDURE — 81001 URINALYSIS AUTO W/SCOPE: CPT | Performed by: FAMILY MEDICINE

## 2017-09-03 PROCEDURE — 72128 CT CHEST SPINE W/O DYE: CPT

## 2017-09-03 PROCEDURE — 80053 COMPREHEN METABOLIC PANEL: CPT | Performed by: FAMILY MEDICINE

## 2017-09-03 PROCEDURE — 85025 COMPLETE CBC W/AUTO DIFF WBC: CPT | Performed by: FAMILY MEDICINE

## 2017-09-03 PROCEDURE — 96374 THER/PROPH/DIAG INJ IV PUSH: CPT

## 2017-09-03 PROCEDURE — 83690 ASSAY OF LIPASE: CPT | Performed by: FAMILY MEDICINE

## 2017-09-03 PROCEDURE — 93010 ELECTROCARDIOGRAM REPORT: CPT | Performed by: INTERNAL MEDICINE

## 2017-09-03 PROCEDURE — 82962 GLUCOSE BLOOD TEST: CPT

## 2017-09-03 PROCEDURE — 72131 CT LUMBAR SPINE W/O DYE: CPT | Performed by: RADIOLOGY

## 2017-09-03 PROCEDURE — 82550 ASSAY OF CK (CPK): CPT | Performed by: FAMILY MEDICINE

## 2017-09-03 PROCEDURE — 93005 ELECTROCARDIOGRAM TRACING: CPT | Performed by: FAMILY MEDICINE

## 2017-09-03 PROCEDURE — 71010 XR CHEST AP: CPT | Performed by: RADIOLOGY

## 2017-09-03 PROCEDURE — 72128 CT CHEST SPINE W/O DYE: CPT | Performed by: RADIOLOGY

## 2017-09-03 PROCEDURE — 0 IOPAMIDOL 61 % SOLUTION: Performed by: FAMILY MEDICINE

## 2017-09-03 PROCEDURE — 74177 CT ABD & PELVIS W/CONTRAST: CPT

## 2017-09-03 PROCEDURE — 72125 CT NECK SPINE W/O DYE: CPT | Performed by: RADIOLOGY

## 2017-09-03 PROCEDURE — 74177 CT ABD & PELVIS W/CONTRAST: CPT | Performed by: RADIOLOGY

## 2017-09-03 PROCEDURE — 82140 ASSAY OF AMMONIA: CPT | Performed by: FAMILY MEDICINE

## 2017-09-03 PROCEDURE — 90715 TDAP VACCINE 7 YRS/> IM: CPT | Performed by: FAMILY MEDICINE

## 2017-09-03 PROCEDURE — 99284 EMERGENCY DEPT VISIT MOD MDM: CPT

## 2017-09-03 PROCEDURE — 90471 IMMUNIZATION ADMIN: CPT | Performed by: FAMILY MEDICINE

## 2017-09-03 PROCEDURE — 72125 CT NECK SPINE W/O DYE: CPT

## 2017-09-03 PROCEDURE — 87086 URINE CULTURE/COLONY COUNT: CPT | Performed by: FAMILY MEDICINE

## 2017-09-03 PROCEDURE — 25010000002 ONDANSETRON PER 1 MG: Performed by: FAMILY MEDICINE

## 2017-09-03 PROCEDURE — 71010 HC CHEST AP: CPT

## 2017-09-03 PROCEDURE — 87040 BLOOD CULTURE FOR BACTERIA: CPT | Performed by: FAMILY MEDICINE

## 2017-09-03 PROCEDURE — 84484 ASSAY OF TROPONIN QUANT: CPT | Performed by: FAMILY MEDICINE

## 2017-09-03 PROCEDURE — 70450 CT HEAD/BRAIN W/O DYE: CPT

## 2017-09-03 PROCEDURE — 25010000002 TDAP 5-2.5-18.5 LF-MCG/0.5 SUSPENSION: Performed by: FAMILY MEDICINE

## 2017-09-03 PROCEDURE — 85007 BL SMEAR W/DIFF WBC COUNT: CPT | Performed by: FAMILY MEDICINE

## 2017-09-03 RX ORDER — ONDANSETRON 2 MG/ML
4 INJECTION INTRAMUSCULAR; INTRAVENOUS ONCE
Status: COMPLETED | OUTPATIENT
Start: 2017-09-03 | End: 2017-09-03

## 2017-09-03 RX ADMIN — IOPAMIDOL 100 ML: 612 INJECTION, SOLUTION INTRAVENOUS at 17:28

## 2017-09-03 RX ADMIN — ONDANSETRON 4 MG: 2 INJECTION INTRAMUSCULAR; INTRAVENOUS at 17:18

## 2017-09-03 RX ADMIN — TETANUS TOXOID, REDUCED DIPHTHERIA TOXOID AND ACELLULAR PERTUSSIS VACCINE, ADSORBED 0.5 ML: 5; 2.5; 8; 8; 2.5 SUSPENSION INTRAMUSCULAR at 18:25

## 2017-09-03 NOTE — ED NOTES
Pt lying flat on stretcher, remains in c collar, x ray at bedside.      Jennifer Foley, WAN  09/03/17 2608

## 2017-09-03 NOTE — ED NOTES
Pt remains in c collar, waiting on ems for transport back to [facility].     Jennifer Foley RN  09/03/17 6546

## 2017-09-03 NOTE — ED PROVIDER NOTES
Subjective   HPI Comments: 62-year-old female with a history of COPD, cirrhosis, recent cervical vertebrae fracture, UTI presents the emergency room with complaints of fall that occurred just prior to arrival.  Patient is being treated at nursing home for cervical fracture any urinary tract infection.  Patient has been receiving IM injections of Rocephin given patient pulled out her IV days ago.  Patient has been noted to have fluctuations in her mood and has been hallucinating per staff at nursing home.  Patient today was noted to roll out of her bed and landed on her left side.  Patient is unsure if she hit her head but does report that she's had right leg pain since her fall.  Patient denies any chest pain or shortness of breath.  She does report that she has back pain since her fall as well.    Patient is a 62 y.o. female presenting with fall.   Fall   Mechanism of injury: fall    Injury location:  Pelvis and torso  Torso injury location:  Back  Pelvic injury location:  R hip  Incident location:  Home  Arrived directly from scene: yes    Fall:     Fall occurred:  From a bed    Impact surface:  Hard floor    Point of impact:  Back    Entrapped after fall: no    Suspicion of alcohol use: no    Suspicion of drug use: no    Tetanus status:  Unknown  Prior to arrival data:     Bystander interventions:  None    Blood loss:  None    Loss of consciousness: no      Amnesic to event: no      Airway interventions:  None  Associated symptoms: back pain and neck pain    Associated symptoms: no abdominal pain, no blindness, no chest pain, no difficulty breathing, no loss of consciousness, no nausea and no vomiting        Review of Systems   Constitutional: Negative for activity change and fever.   HENT: Negative for congestion, sore throat and tinnitus.    Eyes: Negative for blindness and visual disturbance.   Respiratory: Negative for apnea, cough, shortness of breath, wheezing and stridor.    Cardiovascular: Negative for  chest pain.   Gastrointestinal: Negative for abdominal pain, diarrhea, nausea and vomiting.   Genitourinary: Negative for dysuria and flank pain.   Musculoskeletal: Positive for back pain and neck pain. Negative for arthralgias and myalgias.   Skin: Negative for rash.   Neurological: Negative for dizziness, loss of consciousness, weakness and light-headedness.   Hematological: Negative for adenopathy.   Psychiatric/Behavioral: Negative for agitation.   All other systems reviewed and are negative.      Past Medical History:   Diagnosis Date   • CAD (coronary artery disease)    • Cardiac disorder    • Diabetes mellitus    • Glaucoma    • Hypertension    • Infectious viral hepatitis        No Known Allergies    Past Surgical History:   Procedure Laterality Date   • ABDOMINAL WALL MESH  REMOVAL     • HERNIA REPAIR     • HYSTERECTOMY     • KIDNEY SURGERY         Family History   Problem Relation Age of Onset   • Heart disease Other    • Diabetes Other    • Hypertension Other    • Cancer Other        Social History     Social History   • Marital status:      Spouse name: N/A   • Number of children: N/A   • Years of education: N/A     Social History Main Topics   • Smoking status: Current Every Day Smoker   • Smokeless tobacco: None   • Alcohol use No   • Drug use: No   • Sexual activity: Not Asked     Other Topics Concern   • None     Social History Narrative           Objective   Physical Exam   Constitutional: No distress.   HENT:   Head: Atraumatic.   Moist mucous membranes.  Clear oropharynx.   Neck: No JVD present. Carotid bruit is not present.   Cardiovascular: Normal rate, regular rhythm and normal heart sounds.    Pulses:       Radial pulses are 2+ on the right side, and 2+ on the left side.        Dorsalis pedis pulses are 2+ on the right side, and 2+ on the left side.   Pulmonary/Chest: Effort normal and breath sounds normal. She has no decreased breath sounds. She has no wheezes. She has no rhonchi. She  has no rales.   Abdominal: Soft. Bowel sounds are normal. There is no tenderness. There is no rigidity, no rebound and no guarding.   Musculoskeletal:   Cervical vertebral tenderness.  No thoracic or lumbar vertebral tenderness.  No step-off.  Pelvis stable however right lateral hip tender mild to palpation.  No crepitance appreciated.  No limb shortening or external rotation.  Left forearm skin tear proximal laterally.   Neurological: She is alert. No cranial nerve deficit or sensory deficit. GCS eye subscore is 4. GCS verbal subscore is 5. GCS motor subscore is 6.   Reflex Scores:       Patellar reflexes are 2+ on the right side and 2+ on the left side.  Skin: Skin is warm and dry. She is not diaphoretic.        Nursing note and vitals reviewed.      Procedures         ED Course  ED Course                EKG shows normal sinus rhythm with a rate of 67 VA [id number] QRS 72 QTc 448.  No ST elevation or depression.    Patient's chest x-ray is unremarkable.  Patient is noted to have CT T and L-spine is unremarkable.  Patient's CT of abdomen and pelvis with contrast reveals no acute abdomen, or pelvic injury patient has cholelithiasis and evidence of cirrhosis and portal venous hypertension.  Patient is noted to be anemic with a hemoglobin of 8.1 however patient's hemoglobin was 8.9 4 days ago and is likely combination of her chronic disease and recent blood draws.  Patient is hemodynamically stable at this time.  Patient is to continue to receive IM Rocephin for UTI nursing home.  Patient white count unremarkable.  Patient ammonia level is unremarkable as well.  Patient is awake and alert at this time.  Patient to be discharged back to nursing home for further evaluation and treatment of her chronic medical conditions.  MDM    Final diagnoses:   Fall, initial encounter   Acute cystitis with hematuria   Skin tear of forearm without complication, left, initial encounter   Back contusion, unspecified laterality, initial  encounter   Contusion of right hip, initial encounter   Iron deficiency anemia, unspecified iron deficiency anemia type            Heriberto Avalos MD  09/03/17 2845

## 2017-09-03 NOTE — ED NOTES
Present upon arrival, skin tear with steri strips to left forearm. Also present upon arrival, small skin tear to left forearm closer to the elbow, tear cleansed with normal saline, bleeding controlled.     Jennifer Foley RN  09/03/17 7430

## 2017-09-03 NOTE — ED NOTES
Pt removed from backboard per dr rahman. Pt remains in c collar.     Jennifer Foley, RN  09/03/17 1540

## 2017-09-07 LAB — BACTERIA SPEC AEROBE CULT: ABNORMAL

## 2017-09-08 LAB
BACTERIA SPEC AEROBE CULT: NORMAL
BACTERIA SPEC AEROBE CULT: NORMAL

## 2017-09-13 ENCOUNTER — LAB REQUISITION (OUTPATIENT)
Dept: LAB | Facility: HOSPITAL | Age: 62
End: 2017-09-13

## 2017-09-13 DIAGNOSIS — K74.60 CIRRHOSIS OF LIVER (HCC): ICD-10-CM

## 2017-09-13 DIAGNOSIS — K73.9 CHRONIC HEPATITIS (HCC): ICD-10-CM

## 2017-09-13 LAB — AMMONIA BLD-SCNC: 14 UMOL/L (ref 11–51)

## 2017-09-13 PROCEDURE — 82140 ASSAY OF AMMONIA: CPT | Performed by: INTERNAL MEDICINE

## 2017-09-20 ENCOUNTER — LAB REQUISITION (OUTPATIENT)
Dept: LAB | Facility: HOSPITAL | Age: 62
End: 2017-09-20

## 2017-09-20 DIAGNOSIS — K74.60 CIRRHOSIS OF LIVER (HCC): ICD-10-CM

## 2017-09-20 DIAGNOSIS — D64.9 ANEMIA: ICD-10-CM

## 2017-09-20 LAB
AMMONIA BLD-SCNC: 30 UMOL/L (ref 11–51)
BASOPHILS # BLD AUTO: 0.02 10*3/MM3 (ref 0–0.3)
BASOPHILS NFR BLD AUTO: 0.3 % (ref 0–2)
DEPRECATED RDW RBC AUTO: 52.9 FL (ref 37–54)
EOSINOPHIL # BLD AUTO: 1.52 10*3/MM3 (ref 0–0.7)
EOSINOPHIL NFR BLD AUTO: 22.1 % (ref 0–5)
ERYTHROCYTE [DISTWIDTH] IN BLOOD BY AUTOMATED COUNT: 16.5 % (ref 11.5–14.5)
HCT VFR BLD AUTO: 26.3 % (ref 37–47)
HGB BLD-MCNC: 8.6 G/DL (ref 12–16)
IMM GRANULOCYTES # BLD: 0.03 10*3/MM3 (ref 0–0.03)
IMM GRANULOCYTES NFR BLD: 0.4 % (ref 0–0.5)
IRON 24H UR-MRATE: 54 MCG/DL (ref 49–151)
IRON SATN MFR SERPL: 17 % (ref 15–50)
LYMPHOCYTES # BLD AUTO: 1.54 10*3/MM3 (ref 1–3)
LYMPHOCYTES NFR BLD AUTO: 22.4 % (ref 21–51)
MCH RBC QN AUTO: 29.8 PG (ref 27–33)
MCHC RBC AUTO-ENTMCNC: 32.7 G/DL (ref 33–37)
MCV RBC AUTO: 91 FL (ref 80–94)
MONOCYTES # BLD AUTO: 0.58 10*3/MM3 (ref 0.1–0.9)
MONOCYTES NFR BLD AUTO: 8.4 % (ref 0–10)
NEUTROPHILS # BLD AUTO: 3.18 10*3/MM3 (ref 1.4–6.5)
NEUTROPHILS NFR BLD AUTO: 46.4 % (ref 30–70)
PLATELET # BLD AUTO: 150 10*3/MM3 (ref 130–400)
PMV BLD AUTO: 10 FL (ref 6–10)
RBC # BLD AUTO: 2.89 10*6/MM3 (ref 4.2–5.4)
TIBC SERPL-MCNC: 323 MCG/DL (ref 241–421)
WBC NRBC COR # BLD: 6.87 10*3/MM3 (ref 4.5–12.5)

## 2017-09-20 PROCEDURE — 83550 IRON BINDING TEST: CPT | Performed by: INTERNAL MEDICINE

## 2017-09-20 PROCEDURE — 83540 ASSAY OF IRON: CPT | Performed by: INTERNAL MEDICINE

## 2017-09-20 PROCEDURE — 85025 COMPLETE CBC W/AUTO DIFF WBC: CPT | Performed by: INTERNAL MEDICINE

## 2017-09-20 PROCEDURE — 82140 ASSAY OF AMMONIA: CPT | Performed by: INTERNAL MEDICINE

## 2017-09-21 ENCOUNTER — LAB REQUISITION (OUTPATIENT)
Dept: LAB | Facility: HOSPITAL | Age: 62
End: 2017-09-21

## 2017-09-21 DIAGNOSIS — D64.9 ANEMIA: ICD-10-CM

## 2017-09-21 LAB
HEMOCCULT STL QL: NEGATIVE
HEMOCCULT STL QL: NEGATIVE

## 2017-09-21 PROCEDURE — 82272 OCCULT BLD FECES 1-3 TESTS: CPT | Performed by: INTERNAL MEDICINE

## 2017-09-22 ENCOUNTER — LAB REQUISITION (OUTPATIENT)
Dept: LAB | Facility: HOSPITAL | Age: 62
End: 2017-09-22

## 2017-09-22 DIAGNOSIS — D64.9 ANEMIA: ICD-10-CM

## 2017-09-22 LAB — HEMOCCULT STL QL: NEGATIVE

## 2017-09-22 PROCEDURE — 82272 OCCULT BLD FECES 1-3 TESTS: CPT | Performed by: INTERNAL MEDICINE

## 2017-09-28 ENCOUNTER — LAB REQUISITION (OUTPATIENT)
Dept: LAB | Facility: HOSPITAL | Age: 62
End: 2017-09-28

## 2017-09-28 DIAGNOSIS — K74.60 CIRRHOSIS OF LIVER (HCC): ICD-10-CM

## 2017-09-28 LAB — AMMONIA BLD-SCNC: 34 UMOL/L (ref 11–51)

## 2017-09-28 PROCEDURE — 82140 ASSAY OF AMMONIA: CPT | Performed by: INTERNAL MEDICINE

## 2017-10-01 ENCOUNTER — LAB REQUISITION (OUTPATIENT)
Dept: LAB | Facility: HOSPITAL | Age: 62
End: 2017-10-01

## 2017-10-01 DIAGNOSIS — D64.9 ANEMIA: ICD-10-CM

## 2017-10-01 DIAGNOSIS — K74.60 CIRRHOSIS OF LIVER (HCC): ICD-10-CM

## 2017-10-01 LAB
AMMONIA BLD-SCNC: 24 UMOL/L (ref 11–51)
HEMOCCULT STL QL: NEGATIVE

## 2017-10-01 PROCEDURE — 82272 OCCULT BLD FECES 1-3 TESTS: CPT | Performed by: INTERNAL MEDICINE

## 2017-10-01 PROCEDURE — 82140 ASSAY OF AMMONIA: CPT | Performed by: INTERNAL MEDICINE

## 2017-10-02 PROCEDURE — 82272 OCCULT BLD FECES 1-3 TESTS: CPT | Performed by: INTERNAL MEDICINE

## 2017-10-03 ENCOUNTER — LAB REQUISITION (OUTPATIENT)
Dept: LAB | Facility: HOSPITAL | Age: 62
End: 2017-10-03

## 2017-10-03 DIAGNOSIS — K74.60 CIRRHOSIS OF LIVER (HCC): ICD-10-CM

## 2017-10-03 DIAGNOSIS — D64.9 ANEMIA: ICD-10-CM

## 2017-10-03 LAB
AMMONIA BLD-SCNC: 21 UMOL/L (ref 11–51)
HEMOCCULT STL QL: NEGATIVE
HEMOCCULT STL QL: NEGATIVE

## 2017-10-03 PROCEDURE — 82140 ASSAY OF AMMONIA: CPT

## 2017-10-03 PROCEDURE — 82272 OCCULT BLD FECES 1-3 TESTS: CPT | Performed by: INTERNAL MEDICINE

## 2017-10-09 ENCOUNTER — LAB REQUISITION (OUTPATIENT)
Dept: LAB | Facility: HOSPITAL | Age: 62
End: 2017-10-09

## 2017-10-09 DIAGNOSIS — K74.60 CIRRHOSIS OF LIVER (HCC): ICD-10-CM

## 2017-10-09 LAB — AMMONIA BLD-SCNC: 24 UMOL/L (ref 11–51)

## 2017-10-09 PROCEDURE — 82140 ASSAY OF AMMONIA: CPT | Performed by: INTERNAL MEDICINE

## 2017-10-17 ENCOUNTER — LAB REQUISITION (OUTPATIENT)
Dept: LAB | Facility: HOSPITAL | Age: 62
End: 2017-10-17

## 2017-10-17 DIAGNOSIS — Z79.899 OTHER LONG TERM (CURRENT) DRUG THERAPY: ICD-10-CM

## 2017-10-17 LAB — AMMONIA BLD-SCNC: 28 UMOL/L (ref 11–51)

## 2017-10-17 PROCEDURE — 82140 ASSAY OF AMMONIA: CPT | Performed by: INTERNAL MEDICINE

## 2017-10-24 ENCOUNTER — LAB REQUISITION (OUTPATIENT)
Dept: LAB | Facility: HOSPITAL | Age: 62
End: 2017-10-24

## 2017-10-24 DIAGNOSIS — K74.3 PRIMARY BILIARY CHOLANGITIS (HCC): ICD-10-CM

## 2017-10-24 LAB — AMMONIA BLD-SCNC: 24 UMOL/L (ref 11–51)

## 2017-10-24 PROCEDURE — 82140 ASSAY OF AMMONIA: CPT | Performed by: INTERNAL MEDICINE

## 2017-10-31 ENCOUNTER — LAB REQUISITION (OUTPATIENT)
Dept: LAB | Facility: HOSPITAL | Age: 62
End: 2017-10-31

## 2017-10-31 DIAGNOSIS — K74.69 OTHER CIRRHOSIS OF LIVER (HCC): ICD-10-CM

## 2017-10-31 LAB — AMMONIA BLD-SCNC: 23 UMOL/L (ref 11–51)

## 2017-10-31 PROCEDURE — 82140 ASSAY OF AMMONIA: CPT | Performed by: INTERNAL MEDICINE

## 2017-11-30 ENCOUNTER — LAB REQUISITION (OUTPATIENT)
Dept: LAB | Facility: HOSPITAL | Age: 62
End: 2017-11-30

## 2017-11-30 DIAGNOSIS — E03.9 HYPOTHYROIDISM: ICD-10-CM

## 2017-11-30 DIAGNOSIS — K74.69 OTHER CIRRHOSIS OF LIVER (HCC): ICD-10-CM

## 2017-11-30 DIAGNOSIS — D64.9 ANEMIA: ICD-10-CM

## 2017-11-30 LAB
ALBUMIN SERPL-MCNC: 3.4 G/DL (ref 3.4–4.8)
ALBUMIN SERPL-MCNC: 3.4 G/DL (ref 3.4–4.8)
ALBUMIN/GLOB SERPL: 1.1 G/DL (ref 1.5–2.5)
ALP SERPL-CCNC: 84 U/L (ref 35–104)
ALP SERPL-CCNC: 84 U/L (ref 35–104)
ALT SERPL W P-5'-P-CCNC: 30 U/L (ref 10–36)
ALT SERPL W P-5'-P-CCNC: 31 U/L (ref 10–36)
ANION GAP SERPL CALCULATED.3IONS-SCNC: 5.1 MMOL/L (ref 3.6–11.2)
AST SERPL-CCNC: 42 U/L (ref 10–30)
AST SERPL-CCNC: 42 U/L (ref 10–30)
BASOPHILS # BLD AUTO: 0.03 10*3/MM3 (ref 0–0.3)
BASOPHILS NFR BLD AUTO: 0.6 % (ref 0–2)
BILIRUB CONJ SERPL-MCNC: 0.1 MG/DL (ref 0–0.2)
BILIRUB INDIRECT SERPL-MCNC: 0.1 MG/DL
BILIRUB SERPL-MCNC: 0.2 MG/DL (ref 0.2–1.8)
BILIRUB SERPL-MCNC: 0.2 MG/DL (ref 0.2–1.8)
BUN BLD-MCNC: 50 MG/DL (ref 7–21)
BUN/CREAT SERPL: 28.7 (ref 7–25)
CALCIUM SPEC-SCNC: 8.9 MG/DL (ref 7.7–10)
CHLORIDE SERPL-SCNC: 105 MMOL/L (ref 99–112)
CHOLEST SERPL-MCNC: 116 MG/DL (ref 0–200)
CO2 SERPL-SCNC: 24.9 MMOL/L (ref 24.3–31.9)
CREAT BLD-MCNC: 1.74 MG/DL (ref 0.43–1.29)
DEPRECATED RDW RBC AUTO: 46.6 FL (ref 37–54)
EOSINOPHIL # BLD AUTO: 0.48 10*3/MM3 (ref 0–0.7)
EOSINOPHIL NFR BLD AUTO: 8.9 % (ref 0–5)
ERYTHROCYTE [DISTWIDTH] IN BLOOD BY AUTOMATED COUNT: 14.4 % (ref 11.5–14.5)
GFR SERPL CREATININE-BSD FRML MDRD: 30 ML/MIN/1.73
GLOBULIN UR ELPH-MCNC: 3 GM/DL
GLUCOSE BLD-MCNC: 294 MG/DL (ref 70–110)
HBA1C MFR BLD: 6.2 % (ref 4.5–5.7)
HCT VFR BLD AUTO: 24 % (ref 37–47)
HDLC SERPL-MCNC: 45 MG/DL (ref 60–100)
HGB BLD-MCNC: 8.2 G/DL (ref 12–16)
IMM GRANULOCYTES # BLD: 0.02 10*3/MM3 (ref 0–0.03)
IMM GRANULOCYTES NFR BLD: 0.4 % (ref 0–0.5)
LDLC SERPL CALC-MCNC: 39 MG/DL (ref 0–100)
LDLC/HDLC SERPL: 0.87 {RATIO}
LYMPHOCYTES # BLD AUTO: 1.11 10*3/MM3 (ref 1–3)
LYMPHOCYTES NFR BLD AUTO: 20.6 % (ref 21–51)
MCH RBC QN AUTO: 31.7 PG (ref 27–33)
MCHC RBC AUTO-ENTMCNC: 34.2 G/DL (ref 33–37)
MCV RBC AUTO: 92.7 FL (ref 80–94)
MONOCYTES # BLD AUTO: 0.66 10*3/MM3 (ref 0.1–0.9)
MONOCYTES NFR BLD AUTO: 12.2 % (ref 0–10)
NEUTROPHILS # BLD AUTO: 3.09 10*3/MM3 (ref 1.4–6.5)
NEUTROPHILS NFR BLD AUTO: 57.3 % (ref 30–70)
OSMOLALITY SERPL CALC.SUM OF ELEC: 294.3 MOSM/KG (ref 273–305)
PLATELET # BLD AUTO: 125 10*3/MM3 (ref 130–400)
PMV BLD AUTO: 10.4 FL (ref 6–10)
POTASSIUM BLD-SCNC: 5.1 MMOL/L (ref 3.5–5.3)
PROT SERPL-MCNC: 6.4 G/DL (ref 6–8)
PROT SERPL-MCNC: 6.4 G/DL (ref 6–8)
RBC # BLD AUTO: 2.59 10*6/MM3 (ref 4.2–5.4)
SODIUM BLD-SCNC: 135 MMOL/L (ref 135–153)
T4 FREE SERPL-MCNC: 1.12 NG/DL (ref 0.89–1.76)
TRIGL SERPL-MCNC: 160 MG/DL (ref 0–150)
TSH SERPL DL<=0.05 MIU/L-ACNC: 0.89 MIU/ML (ref 0.55–4.78)
VLDLC SERPL-MCNC: 32 MG/DL
WBC NRBC COR # BLD: 5.39 10*3/MM3 (ref 4.5–12.5)

## 2017-11-30 PROCEDURE — 85025 COMPLETE CBC W/AUTO DIFF WBC: CPT | Performed by: INTERNAL MEDICINE

## 2017-11-30 PROCEDURE — 80076 HEPATIC FUNCTION PANEL: CPT | Performed by: INTERNAL MEDICINE

## 2017-11-30 PROCEDURE — 84480 ASSAY TRIIODOTHYRONINE (T3): CPT | Performed by: INTERNAL MEDICINE

## 2017-11-30 PROCEDURE — 80053 COMPREHEN METABOLIC PANEL: CPT | Performed by: INTERNAL MEDICINE

## 2017-11-30 PROCEDURE — 83036 HEMOGLOBIN GLYCOSYLATED A1C: CPT | Performed by: INTERNAL MEDICINE

## 2017-11-30 PROCEDURE — 84439 ASSAY OF FREE THYROXINE: CPT | Performed by: INTERNAL MEDICINE

## 2017-11-30 PROCEDURE — 84443 ASSAY THYROID STIM HORMONE: CPT | Performed by: INTERNAL MEDICINE

## 2017-11-30 PROCEDURE — 80061 LIPID PANEL: CPT | Performed by: INTERNAL MEDICINE

## 2017-12-02 LAB — T3 SERPL-MCNC: 97 NG/DL (ref 71–180)

## 2017-12-04 ENCOUNTER — LAB REQUISITION (OUTPATIENT)
Dept: LAB | Facility: HOSPITAL | Age: 62
End: 2017-12-04

## 2017-12-04 DIAGNOSIS — K72.90 HEPATIC FAILURE, WITHOUT COMA (HCC): ICD-10-CM

## 2017-12-04 LAB — AMMONIA BLD-SCNC: 27 UMOL/L (ref 11–51)

## 2017-12-04 PROCEDURE — 82140 ASSAY OF AMMONIA: CPT | Performed by: INTERNAL MEDICINE

## 2017-12-15 PROCEDURE — 82272 OCCULT BLD FECES 1-3 TESTS: CPT | Performed by: INTERNAL MEDICINE

## 2017-12-16 ENCOUNTER — LAB REQUISITION (OUTPATIENT)
Dept: LAB | Facility: HOSPITAL | Age: 62
End: 2017-12-16

## 2017-12-16 DIAGNOSIS — S81.802A OPEN WOUND OF LEFT LOWER LEG: ICD-10-CM

## 2017-12-16 DIAGNOSIS — D64.9 ANEMIA: ICD-10-CM

## 2017-12-16 DIAGNOSIS — E87.5 HYPERKALEMIA: ICD-10-CM

## 2017-12-16 LAB
HEMOCCULT STL QL: NEGATIVE
HEMOCCULT STL QL: POSITIVE
HEMOCCULT STL QL: POSITIVE
POTASSIUM BLD-SCNC: 3.9 MMOL/L (ref 3.5–5.3)

## 2017-12-16 PROCEDURE — 84132 ASSAY OF SERUM POTASSIUM: CPT | Performed by: INTERNAL MEDICINE

## 2017-12-16 PROCEDURE — 87070 CULTURE OTHR SPECIMN AEROBIC: CPT | Performed by: INTERNAL MEDICINE

## 2017-12-16 PROCEDURE — 87205 SMEAR GRAM STAIN: CPT | Performed by: INTERNAL MEDICINE

## 2017-12-16 PROCEDURE — 82272 OCCULT BLD FECES 1-3 TESTS: CPT | Performed by: INTERNAL MEDICINE

## 2017-12-16 PROCEDURE — 87186 SC STD MICRODIL/AGAR DIL: CPT | Performed by: INTERNAL MEDICINE

## 2017-12-16 PROCEDURE — 87147 CULTURE TYPE IMMUNOLOGIC: CPT | Performed by: INTERNAL MEDICINE

## 2017-12-16 PROCEDURE — 87077 CULTURE AEROBIC IDENTIFY: CPT | Performed by: INTERNAL MEDICINE

## 2017-12-18 ENCOUNTER — LAB REQUISITION (OUTPATIENT)
Dept: LAB | Facility: HOSPITAL | Age: 62
End: 2017-12-18

## 2017-12-18 DIAGNOSIS — K74.3 PRIMARY BILIARY CHOLANGITIS (HCC): ICD-10-CM

## 2017-12-18 LAB — AMMONIA BLD-SCNC: 21 UMOL/L (ref 11–51)

## 2017-12-18 PROCEDURE — 82140 ASSAY OF AMMONIA: CPT | Performed by: INTERNAL MEDICINE

## 2017-12-19 LAB
BACTERIA SPEC AEROBE CULT: ABNORMAL
BACTERIA SPEC AEROBE CULT: ABNORMAL
GRAM STN SPEC: ABNORMAL

## 2017-12-27 ENCOUNTER — LAB REQUISITION (OUTPATIENT)
Dept: LAB | Facility: HOSPITAL | Age: 62
End: 2017-12-27

## 2017-12-27 DIAGNOSIS — N39.0 URINARY TRACT INFECTION: ICD-10-CM

## 2017-12-27 LAB
BACTERIA UR QL AUTO: ABNORMAL /HPF
BILIRUB UR QL STRIP: NEGATIVE
CLARITY UR: ABNORMAL
COLOR UR: YELLOW
GLUCOSE UR STRIP-MCNC: NEGATIVE MG/DL
HGB UR QL STRIP.AUTO: ABNORMAL
HYALINE CASTS UR QL AUTO: ABNORMAL /LPF
KETONES UR QL STRIP: NEGATIVE
LEUKOCYTE ESTERASE UR QL STRIP.AUTO: ABNORMAL
NITRITE UR QL STRIP: NEGATIVE
PH UR STRIP.AUTO: <=5 [PH] (ref 5–8)
PROT UR QL STRIP: ABNORMAL
RBC # UR: ABNORMAL /HPF
REF LAB TEST METHOD: ABNORMAL
SP GR UR STRIP: 1.01 (ref 1–1.03)
SQUAMOUS #/AREA URNS HPF: ABNORMAL /HPF
UROBILINOGEN UR QL STRIP: ABNORMAL
WBC UR QL AUTO: ABNORMAL /HPF
YEAST URNS QL MICRO: ABNORMAL /HPF

## 2017-12-27 PROCEDURE — 87086 URINE CULTURE/COLONY COUNT: CPT | Performed by: INTERNAL MEDICINE

## 2017-12-27 PROCEDURE — 87077 CULTURE AEROBIC IDENTIFY: CPT | Performed by: INTERNAL MEDICINE

## 2017-12-27 PROCEDURE — 87077 CULTURE AEROBIC IDENTIFY: CPT

## 2017-12-27 PROCEDURE — 87186 SC STD MICRODIL/AGAR DIL: CPT | Performed by: INTERNAL MEDICINE

## 2017-12-27 PROCEDURE — 81001 URINALYSIS AUTO W/SCOPE: CPT | Performed by: INTERNAL MEDICINE

## 2017-12-27 PROCEDURE — 87186 SC STD MICRODIL/AGAR DIL: CPT

## 2017-12-30 ENCOUNTER — LAB REQUISITION (OUTPATIENT)
Dept: LAB | Facility: HOSPITAL | Age: 62
End: 2017-12-30

## 2017-12-30 DIAGNOSIS — K74.60 CIRRHOSIS OF LIVER (HCC): ICD-10-CM

## 2017-12-30 LAB — AMMONIA BLD-SCNC: 17 UMOL/L (ref 11–51)

## 2017-12-30 PROCEDURE — 82140 ASSAY OF AMMONIA: CPT | Performed by: INTERNAL MEDICINE

## 2017-12-31 ENCOUNTER — LAB REQUISITION (OUTPATIENT)
Dept: LAB | Facility: HOSPITAL | Age: 62
End: 2017-12-31

## 2017-12-31 DIAGNOSIS — M62.81 MUSCLE WEAKNESS (GENERALIZED): ICD-10-CM

## 2017-12-31 DIAGNOSIS — T84.60XA: ICD-10-CM

## 2017-12-31 LAB
ALBUMIN SERPL-MCNC: 3.6 G/DL (ref 3.4–4.8)
ALBUMIN/GLOB SERPL: 1.1 G/DL (ref 1.5–2.5)
ALP SERPL-CCNC: 92 U/L (ref 35–104)
ALT SERPL W P-5'-P-CCNC: 29 U/L (ref 10–36)
ANION GAP SERPL CALCULATED.3IONS-SCNC: 8.3 MMOL/L (ref 3.6–11.2)
AST SERPL-CCNC: 38 U/L (ref 10–30)
BASOPHILS # BLD AUTO: 0.02 10*3/MM3 (ref 0–0.3)
BASOPHILS NFR BLD AUTO: 0.2 % (ref 0–2)
BILIRUB SERPL-MCNC: 0.2 MG/DL (ref 0.2–1.8)
BUN BLD-MCNC: 83 MG/DL (ref 7–21)
BUN/CREAT SERPL: 32.7 (ref 7–25)
CALCIUM SPEC-SCNC: 9 MG/DL (ref 7.7–10)
CHLORIDE SERPL-SCNC: 102 MMOL/L (ref 99–112)
CO2 SERPL-SCNC: 21.7 MMOL/L (ref 24.3–31.9)
CREAT BLD-MCNC: 2.54 MG/DL (ref 0.43–1.29)
CRP SERPL-MCNC: <0.5 MG/DL (ref 0–0.99)
DEPRECATED RDW RBC AUTO: 44.3 FL (ref 37–54)
EOSINOPHIL # BLD AUTO: 0.61 10*3/MM3 (ref 0–0.7)
EOSINOPHIL NFR BLD AUTO: 6.5 % (ref 0–5)
ERYTHROCYTE [DISTWIDTH] IN BLOOD BY AUTOMATED COUNT: 13.7 % (ref 11.5–14.5)
GFR SERPL CREATININE-BSD FRML MDRD: 19 ML/MIN/1.73
GLOBULIN UR ELPH-MCNC: 3.4 GM/DL
GLUCOSE BLD-MCNC: 356 MG/DL (ref 70–110)
HCT VFR BLD AUTO: 26.1 % (ref 37–47)
HGB BLD-MCNC: 8.5 G/DL (ref 12–16)
IMM GRANULOCYTES # BLD: 0.02 10*3/MM3 (ref 0–0.03)
IMM GRANULOCYTES NFR BLD: 0.2 % (ref 0–0.5)
LYMPHOCYTES # BLD AUTO: 1.15 10*3/MM3 (ref 1–3)
LYMPHOCYTES NFR BLD AUTO: 12.3 % (ref 21–51)
MCH RBC QN AUTO: 29.9 PG (ref 27–33)
MCHC RBC AUTO-ENTMCNC: 32.6 G/DL (ref 33–37)
MCV RBC AUTO: 91.9 FL (ref 80–94)
MONOCYTES # BLD AUTO: 0.5 10*3/MM3 (ref 0.1–0.9)
MONOCYTES NFR BLD AUTO: 5.4 % (ref 0–10)
NEUTROPHILS # BLD AUTO: 7.04 10*3/MM3 (ref 1.4–6.5)
NEUTROPHILS NFR BLD AUTO: 75.4 % (ref 30–70)
OSMOLALITY SERPL CALC.SUM OF ELEC: 303.9 MOSM/KG (ref 273–305)
PLATELET # BLD AUTO: 177 10*3/MM3 (ref 130–400)
PMV BLD AUTO: 9.5 FL (ref 6–10)
POTASSIUM BLD-SCNC: 4.5 MMOL/L (ref 3.5–5.3)
PROT SERPL-MCNC: 7 G/DL (ref 6–8)
RBC # BLD AUTO: 2.84 10*6/MM3 (ref 4.2–5.4)
SODIUM BLD-SCNC: 132 MMOL/L (ref 135–153)
WBC NRBC COR # BLD: 9.34 10*3/MM3 (ref 4.5–12.5)

## 2017-12-31 PROCEDURE — 86140 C-REACTIVE PROTEIN: CPT | Performed by: INTERNAL MEDICINE

## 2017-12-31 PROCEDURE — 85025 COMPLETE CBC W/AUTO DIFF WBC: CPT | Performed by: INTERNAL MEDICINE

## 2017-12-31 PROCEDURE — 80053 COMPREHEN METABOLIC PANEL: CPT | Performed by: INTERNAL MEDICINE

## 2018-01-03 ENCOUNTER — LAB REQUISITION (OUTPATIENT)
Dept: LAB | Facility: HOSPITAL | Age: 63
End: 2018-01-03

## 2018-01-03 DIAGNOSIS — K70.31 ALCOHOLIC CIRRHOSIS OF LIVER WITH ASCITES (HCC): ICD-10-CM

## 2018-01-03 LAB — AMMONIA BLD-SCNC: 20 UMOL/L (ref 11–51)

## 2018-01-03 PROCEDURE — 82140 ASSAY OF AMMONIA: CPT | Performed by: INTERNAL MEDICINE

## 2018-01-05 ENCOUNTER — LAB REQUISITION (OUTPATIENT)
Dept: LAB | Facility: HOSPITAL | Age: 63
End: 2018-01-05

## 2018-01-05 DIAGNOSIS — R53.1 WEAKNESS: ICD-10-CM

## 2018-01-05 DIAGNOSIS — M62.81 MUSCLE WEAKNESS (GENERALIZED): ICD-10-CM

## 2018-01-05 DIAGNOSIS — K74.60 CIRRHOSIS OF LIVER (HCC): ICD-10-CM

## 2018-01-05 DIAGNOSIS — D64.9 ANEMIA: ICD-10-CM

## 2018-01-05 LAB
AMMONIA BLD-SCNC: 27 UMOL/L (ref 11–51)
ANION GAP SERPL CALCULATED.3IONS-SCNC: 0.3 MMOL/L (ref 3.6–11.2)
BASOPHILS # BLD AUTO: 0.02 10*3/MM3 (ref 0–0.3)
BASOPHILS NFR BLD AUTO: 0.4 % (ref 0–2)
BUN BLD-MCNC: 26 MG/DL (ref 7–21)
BUN/CREAT SERPL: 21.8 (ref 7–25)
CALCIUM SPEC-SCNC: 8.5 MG/DL (ref 7.7–10)
CHLORIDE SERPL-SCNC: 113 MMOL/L (ref 99–112)
CO2 SERPL-SCNC: 21.7 MMOL/L (ref 24.3–31.9)
CREAT BLD-MCNC: 1.19 MG/DL (ref 0.43–1.29)
DEPRECATED RDW RBC AUTO: 44.1 FL (ref 37–54)
EOSINOPHIL # BLD AUTO: 0.44 10*3/MM3 (ref 0–0.7)
EOSINOPHIL NFR BLD AUTO: 9.7 % (ref 0–5)
ERYTHROCYTE [DISTWIDTH] IN BLOOD BY AUTOMATED COUNT: 13.7 % (ref 11.5–14.5)
GFR SERPL CREATININE-BSD FRML MDRD: 46 ML/MIN/1.73
GLUCOSE BLD-MCNC: 257 MG/DL (ref 70–110)
HCT VFR BLD AUTO: 22.5 % (ref 37–47)
HGB BLD-MCNC: 7.3 G/DL (ref 12–16)
IMM GRANULOCYTES # BLD: 0.02 10*3/MM3 (ref 0–0.03)
IMM GRANULOCYTES NFR BLD: 0.4 % (ref 0–0.5)
LYMPHOCYTES # BLD AUTO: 1.08 10*3/MM3 (ref 1–3)
LYMPHOCYTES NFR BLD AUTO: 23.9 % (ref 21–51)
MCH RBC QN AUTO: 30.4 PG (ref 27–33)
MCHC RBC AUTO-ENTMCNC: 32.4 G/DL (ref 33–37)
MCV RBC AUTO: 93.8 FL (ref 80–94)
MONOCYTES # BLD AUTO: 0.39 10*3/MM3 (ref 0.1–0.9)
MONOCYTES NFR BLD AUTO: 8.6 % (ref 0–10)
NEUTROPHILS # BLD AUTO: 2.57 10*3/MM3 (ref 1.4–6.5)
NEUTROPHILS NFR BLD AUTO: 57 % (ref 30–70)
OSMOLALITY SERPL CALC.SUM OF ELEC: 283.7 MOSM/KG (ref 273–305)
PLATELET # BLD AUTO: 119 10*3/MM3 (ref 130–400)
PMV BLD AUTO: 9.8 FL (ref 6–10)
POTASSIUM BLD-SCNC: 4.2 MMOL/L (ref 3.5–5.3)
RBC # BLD AUTO: 2.4 10*6/MM3 (ref 4.2–5.4)
SODIUM BLD-SCNC: 135 MMOL/L (ref 135–153)
WBC NRBC COR # BLD: 4.52 10*3/MM3 (ref 4.5–12.5)

## 2018-01-05 PROCEDURE — 80048 BASIC METABOLIC PNL TOTAL CA: CPT | Performed by: INTERNAL MEDICINE

## 2018-01-05 PROCEDURE — 82140 ASSAY OF AMMONIA: CPT | Performed by: INTERNAL MEDICINE

## 2018-01-05 PROCEDURE — 85025 COMPLETE CBC W/AUTO DIFF WBC: CPT | Performed by: INTERNAL MEDICINE

## 2018-01-09 ENCOUNTER — LAB REQUISITION (OUTPATIENT)
Dept: LAB | Facility: HOSPITAL | Age: 63
End: 2018-01-09

## 2018-01-09 DIAGNOSIS — R79.89 OTHER SPECIFIED ABNORMAL FINDINGS OF BLOOD CHEMISTRY: ICD-10-CM

## 2018-01-09 DIAGNOSIS — T84.60XA: ICD-10-CM

## 2018-01-09 DIAGNOSIS — R53.83 OTHER FATIGUE: ICD-10-CM

## 2018-01-09 LAB
6-ACETYL MORPHINE: NEGATIVE
ALBUMIN SERPL-MCNC: 3.1 G/DL (ref 3.4–4.8)
ALBUMIN/GLOB SERPL: 1.1 G/DL (ref 1.5–2.5)
ALP SERPL-CCNC: 76 U/L (ref 35–104)
ALT SERPL W P-5'-P-CCNC: 28 U/L (ref 10–36)
AMPHET+METHAMPHET UR QL: NEGATIVE
ANION GAP SERPL CALCULATED.3IONS-SCNC: 3.3 MMOL/L (ref 3.6–11.2)
AST SERPL-CCNC: 39 U/L (ref 10–30)
BACTERIA SPEC AEROBE CULT: ABNORMAL
BARBITURATES UR QL SCN: NEGATIVE
BASOPHILS # BLD AUTO: 0.02 10*3/MM3 (ref 0–0.3)
BASOPHILS NFR BLD AUTO: 0.3 % (ref 0–2)
BENZODIAZ UR QL SCN: NEGATIVE
BILIRUB SERPL-MCNC: 0.2 MG/DL (ref 0.2–1.8)
BUN BLD-MCNC: 25 MG/DL (ref 7–21)
BUN/CREAT SERPL: 20.8 (ref 7–25)
BUPRENORPHINE SERPL-MCNC: NEGATIVE NG/ML
CALCIUM SPEC-SCNC: 8.6 MG/DL (ref 7.7–10)
CANNABINOIDS SERPL QL: NEGATIVE
CHLORIDE SERPL-SCNC: 111 MMOL/L (ref 99–112)
CO2 SERPL-SCNC: 24.7 MMOL/L (ref 24.3–31.9)
COCAINE UR QL: NEGATIVE
CREAT BLD-MCNC: 1.2 MG/DL (ref 0.43–1.29)
DEPRECATED RDW RBC AUTO: 44.7 FL (ref 37–54)
EOSINOPHIL # BLD AUTO: 0.69 10*3/MM3 (ref 0–0.7)
EOSINOPHIL NFR BLD AUTO: 12 % (ref 0–5)
ERYTHROCYTE [DISTWIDTH] IN BLOOD BY AUTOMATED COUNT: 13.8 % (ref 11.5–14.5)
GFR SERPL CREATININE-BSD FRML MDRD: 45 ML/MIN/1.73
GLOBULIN UR ELPH-MCNC: 2.9 GM/DL
GLUCOSE BLD-MCNC: 158 MG/DL (ref 70–110)
HCT VFR BLD AUTO: 23.3 % (ref 37–47)
HGB BLD-MCNC: 7.5 G/DL (ref 12–16)
IMM GRANULOCYTES # BLD: 0.02 10*3/MM3 (ref 0–0.03)
IMM GRANULOCYTES NFR BLD: 0.3 % (ref 0–0.5)
LYMPHOCYTES # BLD AUTO: 1.43 10*3/MM3 (ref 1–3)
LYMPHOCYTES NFR BLD AUTO: 24.8 % (ref 21–51)
MCH RBC QN AUTO: 30.2 PG (ref 27–33)
MCHC RBC AUTO-ENTMCNC: 32.2 G/DL (ref 33–37)
MCV RBC AUTO: 94 FL (ref 80–94)
METHADONE UR QL SCN: NEGATIVE
MONOCYTES # BLD AUTO: 0.6 10*3/MM3 (ref 0.1–0.9)
MONOCYTES NFR BLD AUTO: 10.4 % (ref 0–10)
NEUTROPHILS # BLD AUTO: 3 10*3/MM3 (ref 1.4–6.5)
NEUTROPHILS NFR BLD AUTO: 52.2 % (ref 30–70)
OPIATES UR QL: NEGATIVE
OSMOLALITY SERPL CALC.SUM OF ELEC: 285.2 MOSM/KG (ref 273–305)
OXYCODONE UR QL SCN: NEGATIVE
PCP UR QL SCN: NEGATIVE
PLATELET # BLD AUTO: 119 10*3/MM3 (ref 130–400)
PMV BLD AUTO: 10.1 FL (ref 6–10)
POTASSIUM BLD-SCNC: 4.9 MMOL/L (ref 3.5–5.3)
PROT SERPL-MCNC: 6 G/DL (ref 6–8)
RBC # BLD AUTO: 2.48 10*6/MM3 (ref 4.2–5.4)
SODIUM BLD-SCNC: 139 MMOL/L (ref 135–153)
WBC NRBC COR # BLD: 5.76 10*3/MM3 (ref 4.5–12.5)

## 2018-01-09 PROCEDURE — 80053 COMPREHEN METABOLIC PANEL: CPT | Performed by: INTERNAL MEDICINE

## 2018-01-09 PROCEDURE — 80307 DRUG TEST PRSMV CHEM ANLYZR: CPT | Performed by: INTERNAL MEDICINE

## 2018-01-09 PROCEDURE — 85025 COMPLETE CBC W/AUTO DIFF WBC: CPT | Performed by: INTERNAL MEDICINE

## 2018-01-11 ENCOUNTER — TRANSCRIBE ORDERS (OUTPATIENT)
Dept: INFUSION THERAPY | Facility: HOSPITAL | Age: 63
End: 2018-01-11

## 2018-01-11 DIAGNOSIS — N39.0 URINARY TRACT INFECTION WITHOUT HEMATURIA, SITE UNSPECIFIED: Primary | ICD-10-CM

## 2018-01-11 LAB
AMPHETAMINES SERPL QL SCN: NEGATIVE NG/ML
BARBITURATES SERPLBLD QL: NEGATIVE UG/ML
BENZODIAZ SERPL QL: NEGATIVE NG/ML
BZE BLD QL SCN: NEGATIVE NG/ML
METHADONE UR QL: NEGATIVE NG/ML
OPIATES SERPL QL SCN: NEGATIVE NG/ML
OXYCODONE SERPL-MCNC: NEGATIVE NG/ML
PCP SPEC-MCNC: NEGATIVE NG/ML
PROPOXYPH SPEC QL: NEGATIVE NG/ML
THC SERPLBLD CFM-MCNC: NEGATIVE NG/ML

## 2018-01-12 ENCOUNTER — HOSPITAL ENCOUNTER (OUTPATIENT)
Dept: INFUSION THERAPY | Facility: HOSPITAL | Age: 63
Discharge: HOME | End: 2018-01-12
Attending: INTERNAL MEDICINE | Admitting: INTERNAL MEDICINE

## 2018-01-12 VITALS
RESPIRATION RATE: 20 BRPM | TEMPERATURE: 97.9 F | DIASTOLIC BLOOD PRESSURE: 63 MMHG | BODY MASS INDEX: 20.09 KG/M2 | HEIGHT: 66 IN | WEIGHT: 125 LBS | HEART RATE: 63 BPM | SYSTOLIC BLOOD PRESSURE: 111 MMHG

## 2018-01-12 DIAGNOSIS — N39.0 URINARY TRACT INFECTION WITHOUT HEMATURIA, SITE UNSPECIFIED: ICD-10-CM

## 2018-01-12 PROCEDURE — C1751 CATH, INF, PER/CENT/MIDLINE: HCPCS

## 2018-01-12 RX ORDER — LACTULOSE 10 G/15ML
10 SOLUTION ORAL 2 TIMES DAILY
COMMUNITY

## 2018-01-12 RX ORDER — SERTRALINE HYDROCHLORIDE 25 MG/1
25 TABLET, FILM COATED ORAL DAILY
Status: ON HOLD | COMMUNITY
End: 2018-02-13

## 2018-01-12 RX ORDER — BUMETANIDE 2 MG/1
2 TABLET ORAL DAILY
Status: ON HOLD | COMMUNITY
End: 2018-02-13

## 2018-01-12 RX ORDER — ATORVASTATIN CALCIUM 10 MG/1
10 TABLET, FILM COATED ORAL NIGHTLY
COMMUNITY

## 2018-01-12 RX ORDER — OXCARBAZEPINE 150 MG/1
150 TABLET, FILM COATED ORAL 2 TIMES DAILY
COMMUNITY

## 2018-01-12 RX ORDER — DOCUSATE SODIUM 250 MG
250 CAPSULE ORAL DAILY
COMMUNITY

## 2018-01-12 RX ORDER — AMLODIPINE BESYLATE AND BENAZEPRIL HYDROCHLORIDE 10; 20 MG/1; MG/1
1 CAPSULE ORAL
Status: ON HOLD | COMMUNITY
End: 2018-02-13

## 2018-01-12 RX ORDER — HYDRALAZINE HYDROCHLORIDE 25 MG/1
25 TABLET, FILM COATED ORAL 2 TIMES DAILY
COMMUNITY

## 2018-01-12 RX ORDER — CARVEDILOL 25 MG/1
25 TABLET ORAL 2 TIMES DAILY
COMMUNITY

## 2018-01-12 RX ORDER — HYDROXYZINE HYDROCHLORIDE 25 MG/1
25 TABLET, FILM COATED ORAL 3 TIMES DAILY PRN
Status: ON HOLD | COMMUNITY
End: 2018-02-13

## 2018-01-12 RX ORDER — BUSPIRONE HYDROCHLORIDE 10 MG/1
10 TABLET ORAL EVERY 12 HOURS
COMMUNITY

## 2018-01-12 NOTE — PATIENT INSTRUCTIONS
PICC Insertion, Care After  Refer to this sheet in the next few weeks. These instructions provide you with information on caring for yourself after your procedure. Your health care provider may also give you more specific instructions. Your treatment has been planned according to current medical practices, but problems sometimes occur. Call your health care provider if you have any problems or questions after your procedure.  What can I expect after the procedure?  After your procedure, it is typical to have the following:  · Mild discomfort at the insertion site. This should not last more than a day.  Follow these instructions at home:  · Rest at home for the remainder of the day after the procedure.  · You may bend your arm and move it freely. If your PICC is near or at the bend of your elbow, avoid activity with repeated motion at the elbow.  · Avoid lifting heavy objects as instructed by your health care provider.  · Avoid using a crutch with the arm on the same side as your PICC. You may need to use a walker.  Bandage Care  · Keep your PICC bandage (dressing) clean and dry to prevent infection.  · Ask your health care provider when you may shower. To keep the dressing dry, cover the PICC with plastic wrap and tape before showering. If the dressing does become wet, replace it right after the shower.  · Do not soak in the bath, swim, or use hot tubs when you have a PICC.  · Change the PICC dressing as instructed by your health care provider.  · Change your PICC dressing if it becomes loose or wet.  General PICC Care  · Check the PICC insertion site daily for leakage, redness, swelling, or pain.  · Flush the PICC as directed by your health care provider. Let your health care provider know right away if the PICC is difficult to flush or does not flush. Do not use force to flush the PICC.  · Do not use a syringe that is less than 10 mL to flush the PICC.  · Never pull or tug on the PICC.  · Avoid blood pressure  "checks on the arm with the PICC.  · Keep your PICC identification card with you at all times.  · Do not take the PICC out yourself. Only a trained health care professional should remove the PICC.  Contact a health care provider if:  · You have pain in your arm, ear, face, or teeth.  · You have fever or chills.  · You have drainage from the PICC insertion site.  · You have redness or palpate a \"cord\" around the PICC insertion site.  · You cannot flush the catheter.  Get help right away if:  · You have swelling in the arm in which the PICC is inserted.  This information is not intended to replace advice given to you by your health care provider. Make sure you discuss any questions you have with your health care provider.  Document Released: 10/08/2014 Document Revised: 05/25/2017 Document Reviewed: 10/10/2014  Elsevier Interactive Patient Education © 2017 [employer]    "

## 2018-01-16 ENCOUNTER — LAB REQUISITION (OUTPATIENT)
Dept: LAB | Facility: HOSPITAL | Age: 63
End: 2018-01-16

## 2018-01-16 DIAGNOSIS — R41.82 ALTERED MENTAL STATUS: ICD-10-CM

## 2018-01-16 LAB — AMMONIA BLD-SCNC: 16 UMOL/L (ref 11–51)

## 2018-01-16 PROCEDURE — 82140 ASSAY OF AMMONIA: CPT | Performed by: INTERNAL MEDICINE

## 2018-01-19 ENCOUNTER — TELEPHONE (OUTPATIENT)
Dept: GASTROENTEROLOGY | Facility: CLINIC | Age: 63
End: 2018-01-19
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
## 2018-01-21 ENCOUNTER — LAB REQUISITION (OUTPATIENT)
Dept: LAB | Facility: HOSPITAL | Age: 63
End: 2018-01-21

## 2018-01-21 DIAGNOSIS — N39.0 URINARY TRACT INFECTION: ICD-10-CM

## 2018-01-21 LAB
BACTERIA UR QL AUTO: ABNORMAL /HPF
BILIRUB UR QL STRIP: NEGATIVE
CLARITY UR: CLEAR
COLOR UR: YELLOW
GLUCOSE UR STRIP-MCNC: NEGATIVE MG/DL
HGB UR QL STRIP.AUTO: ABNORMAL
HYALINE CASTS UR QL AUTO: ABNORMAL /LPF
KETONES UR QL STRIP: NEGATIVE
LEUKOCYTE ESTERASE UR QL STRIP.AUTO: ABNORMAL
NITRITE UR QL STRIP: NEGATIVE
PH UR STRIP.AUTO: 5.5 [PH] (ref 5–8)
PROT UR QL STRIP: ABNORMAL
RBC # UR: ABNORMAL /HPF
REF LAB TEST METHOD: ABNORMAL
SP GR UR STRIP: 1.01 (ref 1–1.03)
SQUAMOUS #/AREA URNS HPF: ABNORMAL /HPF
UROBILINOGEN UR QL STRIP: ABNORMAL
WBC UR QL AUTO: ABNORMAL /HPF
YEAST URNS QL MICRO: ABNORMAL /HPF

## 2018-01-21 PROCEDURE — 87086 URINE CULTURE/COLONY COUNT: CPT | Performed by: INTERNAL MEDICINE

## 2018-01-21 PROCEDURE — 81001 URINALYSIS AUTO W/SCOPE: CPT | Performed by: INTERNAL MEDICINE

## 2018-01-24 ENCOUNTER — LAB REQUISITION (OUTPATIENT)
Dept: LAB | Facility: HOSPITAL | Age: 63
End: 2018-01-24

## 2018-01-24 DIAGNOSIS — K74.60 CIRRHOSIS OF LIVER (HCC): ICD-10-CM

## 2018-01-24 LAB
AMMONIA BLD-SCNC: 21 UMOL/L (ref 11–51)
BACTERIA SPEC AEROBE CULT: ABNORMAL
BACTERIA SPEC AEROBE CULT: ABNORMAL

## 2018-01-24 PROCEDURE — 82140 ASSAY OF AMMONIA: CPT | Performed by: INTERNAL MEDICINE

## 2018-01-26 ENCOUNTER — HOSPITAL ENCOUNTER (EMERGENCY)
Facility: HOSPITAL | Age: 63
Discharge: HOME | End: 2018-01-26
Attending: EMERGENCY MEDICINE | Admitting: EMERGENCY MEDICINE

## 2018-01-26 VITALS
HEART RATE: 71 BPM | DIASTOLIC BLOOD PRESSURE: 79 MMHG | HEIGHT: 66 IN | SYSTOLIC BLOOD PRESSURE: 127 MMHG | TEMPERATURE: 97.9 F | RESPIRATION RATE: 17 BRPM | OXYGEN SATURATION: 98 % | WEIGHT: 129.38 LBS | BODY MASS INDEX: 20.79 KG/M2

## 2018-01-26 DIAGNOSIS — F41.9 ANXIETY: Primary | ICD-10-CM

## 2018-01-26 DIAGNOSIS — R79.89 ELEVATED SERUM CREATININE: ICD-10-CM

## 2018-01-26 LAB
ALBUMIN SERPL-MCNC: 3.5 G/DL (ref 3.4–4.8)
ALBUMIN/GLOB SERPL: 1.2 G/DL (ref 1.5–2.5)
ALP SERPL-CCNC: 79 U/L (ref 35–104)
ALT SERPL W P-5'-P-CCNC: 25 U/L (ref 10–36)
ANION GAP SERPL CALCULATED.3IONS-SCNC: 6 MMOL/L (ref 3.6–11.2)
AST SERPL-CCNC: 30 U/L (ref 10–30)
BASOPHILS # BLD AUTO: 0.04 10*3/MM3 (ref 0–0.3)
BASOPHILS NFR BLD AUTO: 0.5 % (ref 0–2)
BILIRUB SERPL-MCNC: 0.3 MG/DL (ref 0.2–1.8)
BUN BLD-MCNC: 23 MG/DL (ref 7–21)
BUN/CREAT SERPL: 14.5 (ref 7–25)
CALCIUM SPEC-SCNC: 8.7 MG/DL (ref 7.7–10)
CHLORIDE SERPL-SCNC: 109 MMOL/L (ref 99–112)
CO2 SERPL-SCNC: 21 MMOL/L (ref 24.3–31.9)
CREAT BLD-MCNC: 1.59 MG/DL (ref 0.43–1.29)
DEPRECATED RDW RBC AUTO: 47.9 FL (ref 37–54)
EOSINOPHIL # BLD AUTO: 1.86 10*3/MM3 (ref 0–0.7)
EOSINOPHIL NFR BLD AUTO: 24.3 % (ref 0–5)
ERYTHROCYTE [DISTWIDTH] IN BLOOD BY AUTOMATED COUNT: 14.8 % (ref 11.5–14.5)
ETHANOL BLD-MCNC: <10 MG/DL
ETHANOL UR QL: <0.01 %
GFR SERPL CREATININE-BSD FRML MDRD: 33 ML/MIN/1.73
GLOBULIN UR ELPH-MCNC: 3 GM/DL
GLUCOSE BLD-MCNC: 162 MG/DL (ref 70–110)
HCT VFR BLD AUTO: 26.2 % (ref 37–47)
HGB BLD-MCNC: 8.8 G/DL (ref 12–16)
IMM GRANULOCYTES # BLD: 0.03 10*3/MM3 (ref 0–0.03)
IMM GRANULOCYTES NFR BLD: 0.4 % (ref 0–0.5)
LYMPHOCYTES # BLD AUTO: 1.3 10*3/MM3 (ref 1–3)
LYMPHOCYTES NFR BLD AUTO: 17 % (ref 21–51)
MCH RBC QN AUTO: 30.7 PG (ref 27–33)
MCHC RBC AUTO-ENTMCNC: 33.6 G/DL (ref 33–37)
MCV RBC AUTO: 91.3 FL (ref 80–94)
MONOCYTES # BLD AUTO: 0.57 10*3/MM3 (ref 0.1–0.9)
MONOCYTES NFR BLD AUTO: 7.5 % (ref 0–10)
NEUTROPHILS # BLD AUTO: 3.84 10*3/MM3 (ref 1.4–6.5)
NEUTROPHILS NFR BLD AUTO: 50.3 % (ref 30–70)
OSMOLALITY SERPL CALC.SUM OF ELEC: 279.2 MOSM/KG (ref 273–305)
PLATELET # BLD AUTO: 135 10*3/MM3 (ref 130–400)
PMV BLD AUTO: 9.1 FL (ref 6–10)
POTASSIUM BLD-SCNC: 4.3 MMOL/L (ref 3.5–5.3)
PROT SERPL-MCNC: 6.5 G/DL (ref 6–8)
RBC # BLD AUTO: 2.87 10*6/MM3 (ref 4.2–5.4)
SODIUM BLD-SCNC: 136 MMOL/L (ref 135–153)
WBC NRBC COR # BLD: 7.64 10*3/MM3 (ref 4.5–12.5)

## 2018-01-26 PROCEDURE — 80307 DRUG TEST PRSMV CHEM ANLYZR: CPT | Performed by: PHYSICIAN ASSISTANT

## 2018-01-26 PROCEDURE — 85025 COMPLETE CBC W/AUTO DIFF WBC: CPT | Performed by: PHYSICIAN ASSISTANT

## 2018-01-26 PROCEDURE — 36415 COLL VENOUS BLD VENIPUNCTURE: CPT

## 2018-01-26 PROCEDURE — 80053 COMPREHEN METABOLIC PANEL: CPT | Performed by: PHYSICIAN ASSISTANT

## 2018-01-26 PROCEDURE — 99284 EMERGENCY DEPT VISIT MOD MDM: CPT

## 2018-01-26 PROCEDURE — 96360 HYDRATION IV INFUSION INIT: CPT

## 2018-01-26 RX ORDER — SODIUM CHLORIDE 0.9 % (FLUSH) 0.9 %
10 SYRINGE (ML) INJECTION AS NEEDED
Status: DISCONTINUED | OUTPATIENT
Start: 2018-01-26 | End: 2018-01-26 | Stop reason: HOSPADM

## 2018-01-26 RX ADMIN — SODIUM CHLORIDE 1000 ML: 9 INJECTION, SOLUTION INTRAVENOUS at 05:04
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
## 2018-01-26 NOTE — ED NOTES
Pt brief changed, bedside report given to yovanny naranjo.      Jennifer Foley RN  01/26/18 0918

## 2018-01-26 NOTE — ED NOTES
Pt turned and repositioned, pillows placed under bony prominences, pt given ice chips. Pt continues to wait on [facility] for transport back to nursing home. Unit secretary placing follow up call to [facility] to check on transport status.      Jennifer Foley RN  01/26/18 0878

## 2018-01-26 NOTE — ED NOTES
I called EMS to take the patient back to [facility], they said it would be after 8 am.     Shahzad Sylvester  01/26/18 0642

## 2018-01-26 NOTE — ED NOTES
Pt alert with confusion, no hallucinations noted. Skin pwd, no respiratory distress. Pt discharged back to nursing home.      Jennifer Foley RN  01/26/18 1895

## 2018-01-26 NOTE — ED NOTES
"Went to see if pt could give us a urine sample.  Pt states that she is wearing a diaper and \"filled it full.\" RN and I changed her diaper and cleaned her up. Again reminding her that we need a urine sample.      Thiago Pryor  01/26/18 0455    "

## 2018-01-29 ENCOUNTER — LAB REQUISITION (OUTPATIENT)
Dept: LAB | Facility: HOSPITAL | Age: 63
End: 2018-01-29

## 2018-01-29 DIAGNOSIS — K74.60 CIRRHOSIS OF LIVER (HCC): ICD-10-CM

## 2018-01-29 LAB — AMMONIA BLD-SCNC: 23 UMOL/L (ref 11–51)

## 2018-01-29 PROCEDURE — 82140 ASSAY OF AMMONIA: CPT | Performed by: INTERNAL MEDICINE

## 2018-02-07 ENCOUNTER — LAB REQUISITION (OUTPATIENT)
Dept: LAB | Facility: HOSPITAL | Age: 63
End: 2018-02-07

## 2018-02-07 DIAGNOSIS — K74.60 CIRRHOSIS OF LIVER (HCC): ICD-10-CM

## 2018-02-07 DIAGNOSIS — E03.9 HYPOTHYROIDISM: ICD-10-CM

## 2018-02-07 LAB
AMMONIA BLD-SCNC: 27 UMOL/L (ref 11–51)
T3RU NFR SERPL: 21.7 % (ref 22.5–37)
T4 SERPL-MCNC: 11.2 MCG/DL (ref 4.5–10.9)
TSH SERPL DL<=0.05 MIU/L-ACNC: 4.15 MIU/ML (ref 0.55–4.78)

## 2018-02-07 PROCEDURE — 82140 ASSAY OF AMMONIA: CPT | Performed by: INTERNAL MEDICINE

## 2018-02-07 PROCEDURE — 84443 ASSAY THYROID STIM HORMONE: CPT | Performed by: INTERNAL MEDICINE

## 2018-02-07 PROCEDURE — 84479 ASSAY OF THYROID (T3 OR T4): CPT | Performed by: INTERNAL MEDICINE

## 2018-02-07 PROCEDURE — 84436 ASSAY OF TOTAL THYROXINE: CPT | Performed by: INTERNAL MEDICINE

## 2018-02-10 ENCOUNTER — APPOINTMENT (OUTPATIENT)
Dept: GENERAL RADIOLOGY | Facility: HOSPITAL | Age: 63
End: 2018-02-10

## 2018-02-10 ENCOUNTER — HOSPITAL ENCOUNTER (EMERGENCY)
Facility: HOSPITAL | Age: 63
Discharge: HOME | End: 2018-02-10
Attending: EMERGENCY MEDICINE | Admitting: EMERGENCY MEDICINE

## 2018-02-10 ENCOUNTER — LAB REQUISITION (OUTPATIENT)
Dept: LAB | Facility: HOSPITAL | Age: 63
End: 2018-02-10

## 2018-02-10 VITALS
BODY MASS INDEX: 21.66 KG/M2 | DIASTOLIC BLOOD PRESSURE: 69 MMHG | OXYGEN SATURATION: 97 % | HEART RATE: 64 BPM | TEMPERATURE: 98.3 F | HEIGHT: 65 IN | RESPIRATION RATE: 20 BRPM | SYSTOLIC BLOOD PRESSURE: 117 MMHG | WEIGHT: 130 LBS

## 2018-02-10 DIAGNOSIS — R50.9 FEVER AND CHILLS: ICD-10-CM

## 2018-02-10 DIAGNOSIS — N18.9 CHRONIC RENAL IMPAIRMENT, UNSPECIFIED CKD STAGE: ICD-10-CM

## 2018-02-10 DIAGNOSIS — R50.9 FEVER: ICD-10-CM

## 2018-02-10 DIAGNOSIS — N39.0 URINARY TRACT INFECTION WITH HEMATURIA, SITE UNSPECIFIED: Primary | ICD-10-CM

## 2018-02-10 DIAGNOSIS — R31.9 URINARY TRACT INFECTION WITH HEMATURIA, SITE UNSPECIFIED: Primary | ICD-10-CM

## 2018-02-10 DIAGNOSIS — K74.69 OTHER CIRRHOSIS OF LIVER (HCC): ICD-10-CM

## 2018-02-10 LAB
ALBUMIN SERPL-MCNC: 3.2 G/DL (ref 3.4–4.8)
ALBUMIN/GLOB SERPL: 1.1 G/DL (ref 1.5–2.5)
ALP SERPL-CCNC: 72 U/L (ref 35–104)
ALT SERPL W P-5'-P-CCNC: 22 U/L (ref 10–36)
AMMONIA BLD-SCNC: 20 UMOL/L (ref 11–51)
ANION GAP SERPL CALCULATED.3IONS-SCNC: 7.5 MMOL/L (ref 3.6–11.2)
AST SERPL-CCNC: 25 U/L (ref 10–30)
BACTERIA UR QL AUTO: ABNORMAL /HPF
BASOPHILS # BLD AUTO: 0.01 10*3/MM3 (ref 0–0.3)
BASOPHILS NFR BLD AUTO: 0.1 % (ref 0–2)
BILIRUB SERPL-MCNC: 0.3 MG/DL (ref 0.2–1.8)
BILIRUB UR QL STRIP: NEGATIVE
BUN BLD-MCNC: 64 MG/DL (ref 7–21)
BUN/CREAT SERPL: 33.2 (ref 7–25)
CALCIUM SPEC-SCNC: 8.4 MG/DL (ref 7.7–10)
CHLORIDE SERPL-SCNC: 104 MMOL/L (ref 99–112)
CLARITY UR: ABNORMAL
CO2 SERPL-SCNC: 18.5 MMOL/L (ref 24.3–31.9)
COLOR UR: YELLOW
CREAT BLD-MCNC: 1.93 MG/DL (ref 0.43–1.29)
D-LACTATE SERPL-SCNC: 0.7 MMOL/L (ref 0.5–2)
DEPRECATED RDW RBC AUTO: 49 FL (ref 37–54)
EOSINOPHIL # BLD AUTO: 0.42 10*3/MM3 (ref 0–0.7)
EOSINOPHIL NFR BLD AUTO: 5.7 % (ref 0–5)
ERYTHROCYTE [DISTWIDTH] IN BLOOD BY AUTOMATED COUNT: 14.6 % (ref 11.5–14.5)
FLUAV AG NPH QL: NEGATIVE
FLUBV AG NPH QL IA: NEGATIVE
GFR SERPL CREATININE-BSD FRML MDRD: 26 ML/MIN/1.73
GLOBULIN UR ELPH-MCNC: 3 GM/DL
GLUCOSE BLD-MCNC: 144 MG/DL (ref 70–110)
GLUCOSE UR STRIP-MCNC: NEGATIVE MG/DL
HCT VFR BLD AUTO: 22 % (ref 37–47)
HGB BLD-MCNC: 7.2 G/DL (ref 12–16)
HGB UR QL STRIP.AUTO: ABNORMAL
HYALINE CASTS UR QL AUTO: ABNORMAL /LPF
IMM GRANULOCYTES # BLD: 0.03 10*3/MM3 (ref 0–0.03)
IMM GRANULOCYTES NFR BLD: 0.4 % (ref 0–0.5)
KETONES UR QL STRIP: NEGATIVE
LEUKOCYTE ESTERASE UR QL STRIP.AUTO: ABNORMAL
LYMPHOCYTES # BLD AUTO: 0.63 10*3/MM3 (ref 1–3)
LYMPHOCYTES NFR BLD AUTO: 8.6 % (ref 21–51)
MCH RBC QN AUTO: 29.8 PG (ref 27–33)
MCHC RBC AUTO-ENTMCNC: 32.7 G/DL (ref 33–37)
MCV RBC AUTO: 90.9 FL (ref 80–94)
MONOCYTES # BLD AUTO: 1.14 10*3/MM3 (ref 0.1–0.9)
MONOCYTES NFR BLD AUTO: 15.6 % (ref 0–10)
NEUTROPHILS # BLD AUTO: 5.1 10*3/MM3 (ref 1.4–6.5)
NEUTROPHILS NFR BLD AUTO: 69.6 % (ref 30–70)
NITRITE UR QL STRIP: POSITIVE
OSMOLALITY SERPL CALC.SUM OF ELEC: 281.7 MOSM/KG (ref 273–305)
PH UR STRIP.AUTO: <=5 [PH] (ref 5–8)
PLATELET # BLD AUTO: 61 10*3/MM3 (ref 130–400)
PMV BLD AUTO: 12 FL (ref 6–10)
POTASSIUM BLD-SCNC: 4.8 MMOL/L (ref 3.5–5.3)
PROT SERPL-MCNC: 6.2 G/DL (ref 6–8)
PROT UR QL STRIP: ABNORMAL
RBC # BLD AUTO: 2.42 10*6/MM3 (ref 4.2–5.4)
RBC # UR: ABNORMAL /HPF
REF LAB TEST METHOD: ABNORMAL
S PYO AG THROAT QL: NEGATIVE
SODIUM BLD-SCNC: 130 MMOL/L (ref 135–153)
SP GR UR STRIP: 1.02 (ref 1–1.03)
SQUAMOUS #/AREA URNS HPF: ABNORMAL /HPF
UROBILINOGEN UR QL STRIP: ABNORMAL
WBC NRBC COR # BLD: 7.33 10*3/MM3 (ref 4.5–12.5)
WBC UR QL AUTO: ABNORMAL /HPF

## 2018-02-10 PROCEDURE — 81001 URINALYSIS AUTO W/SCOPE: CPT | Performed by: NURSE PRACTITIONER

## 2018-02-10 PROCEDURE — 87077 CULTURE AEROBIC IDENTIFY: CPT | Performed by: NURSE PRACTITIONER

## 2018-02-10 PROCEDURE — 87804 INFLUENZA ASSAY W/OPTIC: CPT | Performed by: NURSE PRACTITIONER

## 2018-02-10 PROCEDURE — 83605 ASSAY OF LACTIC ACID: CPT | Performed by: NURSE PRACTITIONER

## 2018-02-10 PROCEDURE — 71045 X-RAY EXAM CHEST 1 VIEW: CPT

## 2018-02-10 PROCEDURE — 87081 CULTURE SCREEN ONLY: CPT | Performed by: NURSE PRACTITIONER

## 2018-02-10 PROCEDURE — 25010000002 CEFTRIAXONE: Performed by: PHYSICIAN ASSISTANT

## 2018-02-10 PROCEDURE — 71045 X-RAY EXAM CHEST 1 VIEW: CPT | Performed by: RADIOLOGY

## 2018-02-10 PROCEDURE — 99284 EMERGENCY DEPT VISIT MOD MDM: CPT

## 2018-02-10 PROCEDURE — 96365 THER/PROPH/DIAG IV INF INIT: CPT

## 2018-02-10 PROCEDURE — 80053 COMPREHEN METABOLIC PANEL: CPT | Performed by: NURSE PRACTITIONER

## 2018-02-10 PROCEDURE — 87186 SC STD MICRODIL/AGAR DIL: CPT | Performed by: NURSE PRACTITIONER

## 2018-02-10 PROCEDURE — 82140 ASSAY OF AMMONIA: CPT | Performed by: NURSE PRACTITIONER

## 2018-02-10 PROCEDURE — 87880 STREP A ASSAY W/OPTIC: CPT | Performed by: NURSE PRACTITIONER

## 2018-02-10 PROCEDURE — 85025 COMPLETE CBC W/AUTO DIFF WBC: CPT | Performed by: NURSE PRACTITIONER

## 2018-02-10 PROCEDURE — 87086 URINE CULTURE/COLONY COUNT: CPT | Performed by: NURSE PRACTITIONER

## 2018-02-10 PROCEDURE — 87040 BLOOD CULTURE FOR BACTERIA: CPT | Performed by: NURSE PRACTITIONER

## 2018-02-10 RX ORDER — SODIUM CHLORIDE 0.9 % (FLUSH) 0.9 %
10 SYRINGE (ML) INJECTION AS NEEDED
Status: DISCONTINUED | OUTPATIENT
Start: 2018-02-10 | End: 2018-02-10 | Stop reason: HOSPADM

## 2018-02-10 RX ORDER — IBUPROFEN 400 MG/1
400 TABLET ORAL ONCE
Status: COMPLETED | OUTPATIENT
Start: 2018-02-10 | End: 2018-02-10

## 2018-02-10 RX ADMIN — CEFTRIAXONE 1 G: 1 INJECTION, POWDER, FOR SOLUTION INTRAMUSCULAR; INTRAVENOUS at 19:10

## 2018-02-10 RX ADMIN — IBUPROFEN 400 MG: 400 TABLET ORAL at 18:10

## 2018-02-10 NOTE — ED PROVIDER NOTES
Subjective   HPI Comments: Nursing home sent patient to ER for     Patient is a 62 y.o. female presenting with fever.   History provided by:  Nursing home and patient  History limited by:  Dementia  Fever   Max temp prior to arrival:  103  Temp source:  Oral  Severity:  Moderate  Onset quality:  Sudden  Duration:  1 day  Timing:  Constant  Progression:  Waxing and waning  Chronicity:  New  Relieved by:  None tried  Worsened by:  Nothing  Ineffective treatments:  None tried  Associated symptoms: chills and confusion    Associated symptoms: no chest pain, no congestion, no diarrhea, no dysuria, no headaches, no myalgias, no nausea, no rash, no rhinorrhea, no somnolence, no sore throat and no vomiting    Risk factors: no contaminated food, no contaminated water, no immunosuppression, no occupational exposure, no recent sickness, no recent travel and no sick contacts        Review of Systems   Constitutional: Positive for chills and fever.   HENT: Negative.  Negative for congestion, rhinorrhea and sore throat.    Eyes: Negative.    Respiratory: Negative.    Cardiovascular: Negative for chest pain.   Gastrointestinal: Negative.  Negative for diarrhea, nausea and vomiting.   Endocrine: Negative.    Genitourinary: Negative.  Negative for dysuria.   Musculoskeletal: Negative.  Negative for myalgias.   Skin: Negative.  Negative for rash.   Allergic/Immunologic: Negative.    Neurological: Negative.  Negative for headaches.   Hematological: Negative.    Psychiatric/Behavioral: Positive for confusion.       Past Medical History:   Diagnosis Date   • CAD (coronary artery disease)    • Cardiac disorder    • Diabetes mellitus    • Glaucoma    • Hypertension    • Infectious viral hepatitis        No Known Allergies    Past Surgical History:   Procedure Laterality Date   • ABDOMINAL WALL MESH  REMOVAL     • HERNIA REPAIR     • HYSTERECTOMY     • KIDNEY SURGERY         Family History   Problem Relation Age of Onset   • Heart disease  Other    • Diabetes Other    • Hypertension Other    • Cancer Other        Social History     Social History   • Marital status:      Spouse name: N/A   • Number of children: N/A   • Years of education: N/A     Social History Main Topics   • Smoking status: Current Every Day Smoker   • Smokeless tobacco: None   • Alcohol use No   • Drug use: No   • Sexual activity: Defer     Other Topics Concern   • None     Social History Narrative           Objective   Physical Exam   Constitutional: She appears well-developed and well-nourished.   HENT:   Head: Normocephalic.   Left Ear: External ear normal.   Mouth/Throat: Oropharynx is clear and moist.   Eyes: EOM are normal. Pupils are equal, round, and reactive to light.   Neck: Normal range of motion. Neck supple.   Cardiovascular: Normal rate, regular rhythm and normal heart sounds.    Pulmonary/Chest: Effort normal and breath sounds normal.   Abdominal: Soft. Bowel sounds are normal.   Neurological: She is alert.   Skin: Skin is warm.   jaundice   Psychiatric: She has a normal mood and affect. Her behavior is normal.   Nursing note and vitals reviewed.      Procedures         ED Course  ED Course   Comment By Time   Report to LE Boykin, ANICETO 02/10 1717   It does appear the patient has chronic renal insufficiency based on previous labs. Patient will be dicharged back to nursing home. Will instructions to give IV rocephin. Kraig Robles PA-C 02/10 0432                  [facility]    Final diagnoses:   Urinary tract infection with hematuria, site unspecified   Chronic renal impairment, unspecified CKD stage   Other cirrhosis of liver   Fever and chills            Lex Acosta, ANICETO  02/11/18 2573

## 2018-02-10 NOTE — ED PROVIDER NOTES
Subjective   HPI Comments: Patient sent from nursing home due to fever of 103.  Patient does have sniffed a history for diabetes, coronary artery disease, cirrhosis of her liver, hypertension.       History provided by:  Nursing home   used: No        Review of Systems   Constitutional: Positive for fatigue and fever.   Eyes: Negative for pain and redness.   Musculoskeletal: Negative for back pain, gait problem and myalgias.   All other systems reviewed and are negative.      Past Medical History:   Diagnosis Date   • CAD (coronary artery disease)    • Cardiac disorder    • Diabetes mellitus    • Glaucoma    • Hypertension    • Infectious viral hepatitis        No Known Allergies    Past Surgical History:   Procedure Laterality Date   • ABDOMINAL WALL MESH  REMOVAL     • HERNIA REPAIR     • HYSTERECTOMY     • KIDNEY SURGERY         Family History   Problem Relation Age of Onset   • Heart disease Other    • Diabetes Other    • Hypertension Other    • Cancer Other        Social History     Social History   • Marital status:      Spouse name: N/A   • Number of children: N/A   • Years of education: N/A     Social History Main Topics   • Smoking status: Current Every Day Smoker   • Smokeless tobacco: None   • Alcohol use No   • Drug use: No   • Sexual activity: Defer     Other Topics Concern   • None     Social History Narrative           Objective   Physical Exam   Constitutional: She is oriented to person, place, and time. She appears well-developed and well-nourished.   HENT:   Head: Normocephalic.   Right Ear: External ear normal.   Left Ear: External ear normal.   Nose: Nose normal.   Mouth/Throat: Oropharynx is clear and moist.   Eyes: Conjunctivae and EOM are normal. Pupils are equal, round, and reactive to light.   Neck: Normal range of motion. Neck supple. No tracheal deviation present. No thyromegaly present.   Cardiovascular: Normal rate, regular rhythm, normal heart sounds and  intact distal pulses.    Pulmonary/Chest: Effort normal and breath sounds normal. No respiratory distress. She has no wheezes. She has no rales.   Abdominal: Soft. Bowel sounds are normal. She exhibits no distension. There is no tenderness.   Musculoskeletal: Normal range of motion.   Neurological: She is alert and oriented to person, place, and time. She has normal reflexes.   Skin: Skin is warm and dry.   Psychiatric: She has a normal mood and affect. Her behavior is normal. Judgment and thought content normal.   Nursing note and vitals reviewed.      Procedures         ED Course  ED Course   Comment By Time   Report to LE Boykin, APRN 02/10 6848   It does appear the patient has chronic renal insufficiency based on previous labs. Patient will be dicharged back to nursing home. Will instructions to give IV rocephin. Kraig Robles PA-C 02/10 3892                  MDM  Number of Diagnoses or Management Options  Chronic renal impairment, unspecified CKD stage: new and requires workup  Fever and chills: new and requires workup  Other cirrhosis of liver: new and requires workup  Urinary tract infection with hematuria, site unspecified: new and requires workup     Amount and/or Complexity of Data Reviewed  Clinical lab tests: ordered and reviewed  Tests in the radiology section of CPT®: ordered and reviewed  Decide to obtain previous medical records or to obtain history from someone other than the patient: yes    Risk of Complications, Morbidity, and/or Mortality  Presenting problems: moderate  Diagnostic procedures: moderate  Management options: moderate    Patient Progress  Patient progress: stable      Final diagnoses:   None            Kraig Robles PA-C  02/10/18 0966

## 2018-02-11 NOTE — ED NOTES
REPORT TO MARY AT [facility] .  [facility] HERE FOR TRANSPORT.     Tessy Wise RN  02/10/18 2005

## 2018-02-11 NOTE — ED NOTES
CALLED MARY BACK AT NURSING HOME. ADVISED PT DID NOT GET BOLUS OF FLUID.  ORDER PUT IN LATE AND WAS DISCONTIUNED WITHIN 10MINS.     Tessy Wise RN  02/10/18 2022

## 2018-02-12 ENCOUNTER — APPOINTMENT (OUTPATIENT)
Dept: GENERAL RADIOLOGY | Facility: HOSPITAL | Age: 63
End: 2018-02-12

## 2018-02-12 ENCOUNTER — HOSPITAL ENCOUNTER (INPATIENT)
Facility: HOSPITAL | Age: 63
LOS: 4 days | Discharge: NURSING, REHAB OR LONG-TERM CARE | End: 2018-02-16
Attending: FAMILY MEDICINE | Admitting: INTERNAL MEDICINE

## 2018-02-12 DIAGNOSIS — N39.0 URINARY TRACT INFECTION WITHOUT HEMATURIA, SITE UNSPECIFIED: ICD-10-CM

## 2018-02-12 DIAGNOSIS — R78.81 BACTEREMIA: Primary | ICD-10-CM

## 2018-02-12 LAB
ALBUMIN SERPL-MCNC: 3.1 G/DL (ref 3.4–4.8)
ALBUMIN/GLOB SERPL: 1 G/DL (ref 1.5–2.5)
ALP SERPL-CCNC: 90 U/L (ref 35–104)
ALT SERPL W P-5'-P-CCNC: 25 U/L (ref 10–36)
ANION GAP SERPL CALCULATED.3IONS-SCNC: 7.8 MMOL/L (ref 3.6–11.2)
APTT PPP: 32.6 SECONDS (ref 23.8–36.1)
AST SERPL-CCNC: 34 U/L (ref 10–30)
BACTERIA SPEC AEROBE CULT: NORMAL
BACTERIA UR QL AUTO: ABNORMAL /HPF
BASOPHILS # BLD AUTO: 0.02 10*3/MM3 (ref 0–0.3)
BASOPHILS NFR BLD AUTO: 0.4 % (ref 0–2)
BILIRUB SERPL-MCNC: 0.1 MG/DL (ref 0.2–1.8)
BILIRUB UR QL STRIP: NEGATIVE
BILIRUB UR QL STRIP: NEGATIVE
BNP SERPL-MCNC: 287 PG/ML (ref 0–100)
BUN BLD-MCNC: 55 MG/DL (ref 7–21)
BUN/CREAT SERPL: 31.8 (ref 7–25)
CALCIUM SPEC-SCNC: 8.3 MG/DL (ref 7.7–10)
CHLORIDE SERPL-SCNC: 112 MMOL/L (ref 99–112)
CLARITY UR: ABNORMAL
CLARITY UR: ABNORMAL
CO2 SERPL-SCNC: 16.2 MMOL/L (ref 24.3–31.9)
COLOR UR: YELLOW
COLOR UR: YELLOW
CREAT BLD-MCNC: 1.73 MG/DL (ref 0.43–1.29)
D-LACTATE SERPL-SCNC: 0.7 MMOL/L (ref 0.5–2)
DEPRECATED RDW RBC AUTO: 49 FL (ref 37–54)
EOSINOPHIL # BLD AUTO: 0.57 10*3/MM3 (ref 0–0.7)
EOSINOPHIL NFR BLD AUTO: 12.4 % (ref 0–5)
ERYTHROCYTE [DISTWIDTH] IN BLOOD BY AUTOMATED COUNT: 14.5 % (ref 11.5–14.5)
GFR SERPL CREATININE-BSD FRML MDRD: 30 ML/MIN/1.73
GLOBULIN UR ELPH-MCNC: 3.1 GM/DL
GLUCOSE BLD-MCNC: 182 MG/DL (ref 70–110)
GLUCOSE UR STRIP-MCNC: NEGATIVE MG/DL
GLUCOSE UR STRIP-MCNC: NEGATIVE MG/DL
HCT VFR BLD AUTO: 23 % (ref 37–47)
HGB BLD-MCNC: 7.3 G/DL (ref 12–16)
HGB UR QL STRIP.AUTO: ABNORMAL
HGB UR QL STRIP.AUTO: ABNORMAL
HYALINE CASTS UR QL AUTO: ABNORMAL /LPF
IMM GRANULOCYTES # BLD: 0.06 10*3/MM3 (ref 0–0.03)
IMM GRANULOCYTES NFR BLD: 1.3 % (ref 0–0.5)
INR PPP: 0.99 (ref 0.9–1.1)
KETONES UR QL STRIP: NEGATIVE
KETONES UR QL STRIP: NEGATIVE
LEUKOCYTE ESTERASE UR QL STRIP.AUTO: ABNORMAL
LEUKOCYTE ESTERASE UR QL STRIP.AUTO: ABNORMAL
LIPASE SERPL-CCNC: 147 U/L (ref 13–60)
LYMPHOCYTES # BLD AUTO: 0.64 10*3/MM3 (ref 1–3)
LYMPHOCYTES NFR BLD AUTO: 13.9 % (ref 21–51)
MCH RBC QN AUTO: 29 PG (ref 27–33)
MCHC RBC AUTO-ENTMCNC: 31.7 G/DL (ref 33–37)
MCV RBC AUTO: 91.3 FL (ref 80–94)
MONOCYTES # BLD AUTO: 0.65 10*3/MM3 (ref 0.1–0.9)
MONOCYTES NFR BLD AUTO: 14.1 % (ref 0–10)
NEUTROPHILS # BLD AUTO: 2.66 10*3/MM3 (ref 1.4–6.5)
NEUTROPHILS NFR BLD AUTO: 57.9 % (ref 30–70)
NITRITE UR QL STRIP: NEGATIVE
NITRITE UR QL STRIP: NEGATIVE
OSMOLALITY SERPL CALC.SUM OF ELEC: 291.7 MOSM/KG (ref 273–305)
PH UR STRIP.AUTO: <=5 [PH] (ref 5–8)
PH UR STRIP.AUTO: <=5 [PH] (ref 5–8)
PLATELET # BLD AUTO: 101 10*3/MM3 (ref 130–400)
PMV BLD AUTO: 11.1 FL (ref 6–10)
POTASSIUM BLD-SCNC: 5 MMOL/L (ref 3.5–5.3)
PROT SERPL-MCNC: 6.2 G/DL (ref 6–8)
PROT UR QL STRIP: ABNORMAL
PROT UR QL STRIP: ABNORMAL
PROTHROMBIN TIME: 13.2 SECONDS (ref 11–15.4)
RBC # BLD AUTO: 2.52 10*6/MM3 (ref 4.2–5.4)
RBC # UR: ABNORMAL /HPF
REF LAB TEST METHOD: ABNORMAL
SODIUM BLD-SCNC: 136 MMOL/L (ref 135–153)
SP GR UR STRIP: 1.02 (ref 1–1.03)
SP GR UR STRIP: 1.02 (ref 1–1.03)
SQUAMOUS #/AREA URNS HPF: ABNORMAL /HPF
TROPONIN I SERPL-MCNC: <0.006 NG/ML
UROBILINOGEN UR QL STRIP: ABNORMAL
UROBILINOGEN UR QL STRIP: ABNORMAL
WBC NRBC COR # BLD: 4.6 10*3/MM3 (ref 4.5–12.5)
WBC UR QL AUTO: ABNORMAL /HPF

## 2018-02-12 PROCEDURE — 36415 COLL VENOUS BLD VENIPUNCTURE: CPT

## 2018-02-12 PROCEDURE — 71045 X-RAY EXAM CHEST 1 VIEW: CPT | Performed by: RADIOLOGY

## 2018-02-12 PROCEDURE — 87086 URINE CULTURE/COLONY COUNT: CPT | Performed by: FAMILY MEDICINE

## 2018-02-12 PROCEDURE — 25010000002 PIPERACILLIN-TAZOBACTAM: Performed by: FAMILY MEDICINE

## 2018-02-12 PROCEDURE — 85730 THROMBOPLASTIN TIME PARTIAL: CPT | Performed by: FAMILY MEDICINE

## 2018-02-12 PROCEDURE — 71045 X-RAY EXAM CHEST 1 VIEW: CPT

## 2018-02-12 PROCEDURE — 87186 SC STD MICRODIL/AGAR DIL: CPT | Performed by: FAMILY MEDICINE

## 2018-02-12 PROCEDURE — 83605 ASSAY OF LACTIC ACID: CPT | Performed by: FAMILY MEDICINE

## 2018-02-12 PROCEDURE — 81001 URINALYSIS AUTO W/SCOPE: CPT | Performed by: FAMILY MEDICINE

## 2018-02-12 PROCEDURE — 81003 URINALYSIS AUTO W/O SCOPE: CPT | Performed by: FAMILY MEDICINE

## 2018-02-12 PROCEDURE — 83880 ASSAY OF NATRIURETIC PEPTIDE: CPT | Performed by: FAMILY MEDICINE

## 2018-02-12 PROCEDURE — 99285 EMERGENCY DEPT VISIT HI MDM: CPT

## 2018-02-12 PROCEDURE — 93005 ELECTROCARDIOGRAM TRACING: CPT | Performed by: FAMILY MEDICINE

## 2018-02-12 PROCEDURE — 80053 COMPREHEN METABOLIC PANEL: CPT | Performed by: FAMILY MEDICINE

## 2018-02-12 PROCEDURE — 85610 PROTHROMBIN TIME: CPT | Performed by: FAMILY MEDICINE

## 2018-02-12 PROCEDURE — 87077 CULTURE AEROBIC IDENTIFY: CPT | Performed by: FAMILY MEDICINE

## 2018-02-12 PROCEDURE — 87040 BLOOD CULTURE FOR BACTERIA: CPT | Performed by: FAMILY MEDICINE

## 2018-02-12 PROCEDURE — 93010 ELECTROCARDIOGRAM REPORT: CPT | Performed by: INTERNAL MEDICINE

## 2018-02-12 PROCEDURE — 84484 ASSAY OF TROPONIN QUANT: CPT | Performed by: FAMILY MEDICINE

## 2018-02-12 PROCEDURE — 25010000002 VANCOMYCIN PER 500 MG: Performed by: FAMILY MEDICINE

## 2018-02-12 PROCEDURE — 85025 COMPLETE CBC W/AUTO DIFF WBC: CPT | Performed by: FAMILY MEDICINE

## 2018-02-12 PROCEDURE — 83690 ASSAY OF LIPASE: CPT | Performed by: FAMILY MEDICINE

## 2018-02-12 RX ORDER — DILTIAZEM HYDROCHLORIDE 180 MG/1
180 CAPSULE, COATED, EXTENDED RELEASE ORAL
Status: DISCONTINUED | OUTPATIENT
Start: 2018-02-13 | End: 2018-02-13 | Stop reason: ALTCHOICE

## 2018-02-12 RX ORDER — HEPARIN SODIUM 5000 [USP'U]/ML
5000 INJECTION, SOLUTION INTRAVENOUS; SUBCUTANEOUS EVERY 12 HOURS SCHEDULED
Status: DISCONTINUED | OUTPATIENT
Start: 2018-02-13 | End: 2018-02-16 | Stop reason: HOSPADM

## 2018-02-12 RX ORDER — ATORVASTATIN CALCIUM 10 MG/1
10 TABLET, FILM COATED ORAL DAILY
Status: DISCONTINUED | OUTPATIENT
Start: 2018-02-13 | End: 2018-02-16 | Stop reason: HOSPADM

## 2018-02-12 RX ORDER — NITROGLYCERIN 0.4 MG/1
0.4 TABLET SUBLINGUAL
Status: DISCONTINUED | OUTPATIENT
Start: 2018-02-12 | End: 2018-02-16 | Stop reason: HOSPADM

## 2018-02-12 RX ORDER — ASPIRIN 81 MG/1
81 TABLET ORAL DAILY
Status: DISCONTINUED | OUTPATIENT
Start: 2018-02-13 | End: 2018-02-13 | Stop reason: ALTCHOICE

## 2018-02-12 RX ORDER — SODIUM CHLORIDE 0.9 % (FLUSH) 0.9 %
1-10 SYRINGE (ML) INJECTION AS NEEDED
Status: DISCONTINUED | OUTPATIENT
Start: 2018-02-12 | End: 2018-02-16 | Stop reason: HOSPADM

## 2018-02-12 RX ADMIN — TAZOBACTAM SODIUM AND PIPERACILLIN SODIUM 4.5 G: .5; 4 INJECTION, POWDER, LYOPHILIZED, FOR SOLUTION INTRAVENOUS at 22:27

## 2018-02-12 RX ADMIN — SODIUM CHLORIDE 500 ML: 9 INJECTION, SOLUTION INTRAVENOUS at 20:19

## 2018-02-12 RX ADMIN — VANCOMYCIN HYDROCHLORIDE 1250 MG: 5 INJECTION, POWDER, LYOPHILIZED, FOR SOLUTION INTRAVENOUS at 23:03

## 2018-02-13 ENCOUNTER — APPOINTMENT (OUTPATIENT)
Dept: CARDIOLOGY | Facility: HOSPITAL | Age: 63
End: 2018-02-13
Attending: INTERNAL MEDICINE

## 2018-02-13 LAB
ALBUMIN SERPL-MCNC: 2.9 G/DL (ref 3.4–4.8)
ALBUMIN/GLOB SERPL: 1 G/DL (ref 1.5–2.5)
ALP SERPL-CCNC: 75 U/L (ref 35–104)
ALT SERPL W P-5'-P-CCNC: 22 U/L (ref 10–36)
AMMONIA BLD-SCNC: 20 UMOL/L (ref 11–51)
ANION GAP SERPL CALCULATED.3IONS-SCNC: 8.4 MMOL/L (ref 3.6–11.2)
AST SERPL-CCNC: 28 U/L (ref 10–30)
BACTERIA SPEC AEROBE CULT: ABNORMAL
BH CV ECHO MEAS - % IVS THICK: -16.2 %
BH CV ECHO MEAS - % LVPW THICK: 36.8 %
BH CV ECHO MEAS - ACS: 2 CM
BH CV ECHO MEAS - AO MAX PG: 19.6 MMHG
BH CV ECHO MEAS - AO MEAN PG: 7.9 MMHG
BH CV ECHO MEAS - AO ROOT AREA (BSA CORRECTED): 1.8
BH CV ECHO MEAS - AO ROOT AREA: 7.1 CM^2
BH CV ECHO MEAS - AO ROOT DIAM: 3 CM
BH CV ECHO MEAS - AO V2 MAX: 221.3 CM/SEC
BH CV ECHO MEAS - AO V2 MEAN: 125.1 CM/SEC
BH CV ECHO MEAS - AO V2 VTI: 56.4 CM
BH CV ECHO MEAS - BSA(HAYCOCK): 1.7 M^2
BH CV ECHO MEAS - BSA: 1.7 M^2
BH CV ECHO MEAS - BZI_BMI: 21.1 KILOGRAMS/M^2
BH CV ECHO MEAS - BZI_METRIC_HEIGHT: 167.6 CM
BH CV ECHO MEAS - BZI_METRIC_WEIGHT: 59.4 KG
BH CV ECHO MEAS - CONTRAST EF 4CH: 68.4 ML/M^2
BH CV ECHO MEAS - EDV(CUBED): 73.6 ML
BH CV ECHO MEAS - EDV(MOD-SP4): 38 ML
BH CV ECHO MEAS - EDV(TEICH): 78.2 ML
BH CV ECHO MEAS - EF(CUBED): 75.6 %
BH CV ECHO MEAS - EF(MOD-SP4): 68.4 %
BH CV ECHO MEAS - EF(TEICH): 68 %
BH CV ECHO MEAS - ESV(CUBED): 17.9 ML
BH CV ECHO MEAS - ESV(MOD-SP4): 12 ML
BH CV ECHO MEAS - ESV(TEICH): 25 ML
BH CV ECHO MEAS - FS: 37.5 %
BH CV ECHO MEAS - IVS/LVPW: 1.3
BH CV ECHO MEAS - IVSD: 1.3 CM
BH CV ECHO MEAS - IVSS: 1.1 CM
BH CV ECHO MEAS - LA DIMENSION: 2.9 CM
BH CV ECHO MEAS - LA/AO: 0.95
BH CV ECHO MEAS - LV DIASTOLIC VOL/BSA (35-75): 22.7 ML/M^2
BH CV ECHO MEAS - LV MASS(C)D: 159.7 GRAMS
BH CV ECHO MEAS - LV MASS(C)DI: 95.6 GRAMS/M^2
BH CV ECHO MEAS - LV MASS(C)S: 89.7 GRAMS
BH CV ECHO MEAS - LV MASS(C)SI: 53.7 GRAMS/M^2
BH CV ECHO MEAS - LV SYSTOLIC VOL/BSA (12-30): 7.2 ML/M^2
BH CV ECHO MEAS - LVIDD: 4.2 CM
BH CV ECHO MEAS - LVIDS: 2.6 CM
BH CV ECHO MEAS - LVLD AP4: 5.8 CM
BH CV ECHO MEAS - LVLS AP4: 4 CM
BH CV ECHO MEAS - LVOT AREA (M): 3.5 CM^2
BH CV ECHO MEAS - LVOT AREA: 3.5 CM^2
BH CV ECHO MEAS - LVOT DIAM: 2.1 CM
BH CV ECHO MEAS - LVPWD: 0.97 CM
BH CV ECHO MEAS - LVPWS: 1.3 CM
BH CV ECHO MEAS - MV A MAX VEL: 118 CM/SEC
BH CV ECHO MEAS - MV E MAX VEL: 87.4 CM/SEC
BH CV ECHO MEAS - MV E/A: 0.74
BH CV ECHO MEAS - PA ACC SLOPE: 1063 CM/SEC^2
BH CV ECHO MEAS - PA ACC TIME: 0.12 SEC
BH CV ECHO MEAS - PA PR(ACCEL): 26.7 MMHG
BH CV ECHO MEAS - RAP SYSTOLE: 10 MMHG
BH CV ECHO MEAS - RVDD: 1.3 CM
BH CV ECHO MEAS - RVSP: 57.2 MMHG
BH CV ECHO MEAS - SI(AO): 240.2 ML/M^2
BH CV ECHO MEAS - SI(CUBED): 33.3 ML/M^2
BH CV ECHO MEAS - SI(MOD-SP4): 15.6 ML/M^2
BH CV ECHO MEAS - SI(TEICH): 31.8 ML/M^2
BH CV ECHO MEAS - SV(AO): 401.4 ML
BH CV ECHO MEAS - SV(CUBED): 55.7 ML
BH CV ECHO MEAS - SV(MOD-SP4): 26 ML
BH CV ECHO MEAS - SV(TEICH): 53.2 ML
BH CV ECHO MEAS - TR MAX VEL: 343.4 CM/SEC
BILIRUB SERPL-MCNC: 0.2 MG/DL (ref 0.2–1.8)
BUN BLD-MCNC: 45 MG/DL (ref 7–21)
BUN/CREAT SERPL: 30.4 (ref 7–25)
CALCIUM SPEC-SCNC: 8.1 MG/DL (ref 7.7–10)
CHLORIDE SERPL-SCNC: 113 MMOL/L (ref 99–112)
CK MB SERPL-CCNC: 1 NG/ML (ref 0–5)
CK MB SERPL-RTO: 7.7 % (ref 0–3)
CK SERPL-CCNC: 13 U/L (ref 24–173)
CO2 SERPL-SCNC: 15.6 MMOL/L (ref 24.3–31.9)
CREAT BLD-MCNC: 1.48 MG/DL (ref 0.43–1.29)
CRP SERPL-MCNC: 2.84 MG/DL (ref 0–0.99)
DEPRECATED RDW RBC AUTO: 46.3 FL (ref 37–54)
EOSINOPHIL # BLD MANUAL: 0.66 10*3/MM3 (ref 0–0.7)
EOSINOPHIL NFR BLD MANUAL: 16 % (ref 0–5)
ERYTHROCYTE [DISTWIDTH] IN BLOOD BY AUTOMATED COUNT: 14.3 % (ref 11.5–14.5)
GFR SERPL CREATININE-BSD FRML MDRD: 36 ML/MIN/1.73
GLOBULIN UR ELPH-MCNC: 2.9 GM/DL
GLUCOSE BLD-MCNC: 171 MG/DL (ref 70–110)
GLUCOSE BLDC GLUCOMTR-MCNC: 185 MG/DL (ref 70–130)
GRAM STN SPEC: ABNORMAL
GRAM STN SPEC: ABNORMAL
HCT VFR BLD AUTO: 22.2 % (ref 37–47)
HGB BLD-MCNC: 7.1 G/DL (ref 12–16)
ISOLATED FROM: ABNORMAL
ISOLATED FROM: ABNORMAL
LYMPHOCYTES # BLD MANUAL: 0.83 10*3/MM3 (ref 1–3)
LYMPHOCYTES NFR BLD MANUAL: 12 % (ref 0–10)
LYMPHOCYTES NFR BLD MANUAL: 20 % (ref 21–51)
MAXIMAL PREDICTED HEART RATE: 157 BPM
MCH RBC QN AUTO: 29.7 PG (ref 27–33)
MCHC RBC AUTO-ENTMCNC: 32 G/DL (ref 33–37)
MCV RBC AUTO: 92.9 FL (ref 80–94)
MONOCYTES # BLD AUTO: 0.5 10*3/MM3 (ref 0.1–0.9)
MYOGLOBIN SERPL-MCNC: 39 NG/ML (ref 0–109)
NEUTROPHILS # BLD AUTO: 2.15 10*3/MM3 (ref 1.4–6.5)
NEUTROPHILS NFR BLD MANUAL: 52 % (ref 30–70)
OSMOLALITY SERPL CALC.SUM OF ELEC: 289.4 MOSM/KG (ref 273–305)
PLAT MORPH BLD: NORMAL
PLATELET # BLD AUTO: 105 10*3/MM3 (ref 130–400)
PMV BLD AUTO: 10.8 FL (ref 6–10)
POTASSIUM BLD-SCNC: 4.6 MMOL/L (ref 3.5–5.3)
PROT SERPL-MCNC: 5.8 G/DL (ref 6–8)
RBC # BLD AUTO: 2.39 10*6/MM3 (ref 4.2–5.4)
RBC MORPH BLD: NORMAL
SCAN SLIDE: NORMAL
SODIUM BLD-SCNC: 137 MMOL/L (ref 135–153)
STRESS TARGET HR: 133 BPM
TROPONIN I SERPL-MCNC: <0.006 NG/ML
WBC NRBC COR # BLD: 4.14 10*3/MM3 (ref 4.5–12.5)

## 2018-02-13 PROCEDURE — 99223 1ST HOSP IP/OBS HIGH 75: CPT | Performed by: INTERNAL MEDICINE

## 2018-02-13 PROCEDURE — 82550 ASSAY OF CK (CPK): CPT | Performed by: INTERNAL MEDICINE

## 2018-02-13 PROCEDURE — 25010000002 MEROPENEM: Performed by: INTERNAL MEDICINE

## 2018-02-13 PROCEDURE — 82140 ASSAY OF AMMONIA: CPT | Performed by: INTERNAL MEDICINE

## 2018-02-13 PROCEDURE — 86140 C-REACTIVE PROTEIN: CPT | Performed by: INTERNAL MEDICINE

## 2018-02-13 PROCEDURE — 82962 GLUCOSE BLOOD TEST: CPT

## 2018-02-13 PROCEDURE — 80053 COMPREHEN METABOLIC PANEL: CPT | Performed by: INTERNAL MEDICINE

## 2018-02-13 PROCEDURE — 83874 ASSAY OF MYOGLOBIN: CPT | Performed by: INTERNAL MEDICINE

## 2018-02-13 PROCEDURE — 85007 BL SMEAR W/DIFF WBC COUNT: CPT | Performed by: INTERNAL MEDICINE

## 2018-02-13 PROCEDURE — 82553 CREATINE MB FRACTION: CPT | Performed by: INTERNAL MEDICINE

## 2018-02-13 PROCEDURE — 93306 TTE W/DOPPLER COMPLETE: CPT | Performed by: INTERNAL MEDICINE

## 2018-02-13 PROCEDURE — 25010000002 HEPARIN (PORCINE) PER 1000 UNITS: Performed by: INTERNAL MEDICINE

## 2018-02-13 PROCEDURE — 63710000001 INSULIN DETEMIR PER 5 UNITS: Performed by: INTERNAL MEDICINE

## 2018-02-13 PROCEDURE — 94799 UNLISTED PULMONARY SVC/PX: CPT

## 2018-02-13 PROCEDURE — 84484 ASSAY OF TROPONIN QUANT: CPT | Performed by: INTERNAL MEDICINE

## 2018-02-13 PROCEDURE — 85025 COMPLETE CBC W/AUTO DIFF WBC: CPT | Performed by: INTERNAL MEDICINE

## 2018-02-13 PROCEDURE — 93306 TTE W/DOPPLER COMPLETE: CPT

## 2018-02-13 RX ORDER — MULTIVITAMIN
1 TABLET ORAL DAILY
Status: DISCONTINUED | OUTPATIENT
Start: 2018-02-13 | End: 2018-02-16 | Stop reason: HOSPADM

## 2018-02-13 RX ORDER — OXYCODONE HYDROCHLORIDE 5 MG/1
5 TABLET ORAL EVERY 4 HOURS PRN
Status: DISCONTINUED | OUTPATIENT
Start: 2018-02-13 | End: 2018-02-16 | Stop reason: HOSPADM

## 2018-02-13 RX ORDER — PANTOPRAZOLE SODIUM 40 MG/1
40 TABLET, DELAYED RELEASE ORAL 2 TIMES DAILY
Status: DISCONTINUED | OUTPATIENT
Start: 2018-02-13 | End: 2018-02-16 | Stop reason: HOSPADM

## 2018-02-13 RX ORDER — AMLODIPINE BESYLATE 10 MG/1
10 TABLET ORAL DAILY
COMMUNITY

## 2018-02-13 RX ORDER — DOCUSATE SODIUM 100 MG/1
200 CAPSULE, LIQUID FILLED ORAL DAILY
Status: DISCONTINUED | OUTPATIENT
Start: 2018-02-13 | End: 2018-02-16 | Stop reason: HOSPADM

## 2018-02-13 RX ORDER — GABAPENTIN 400 MG/1
400 CAPSULE ORAL 2 TIMES DAILY
COMMUNITY

## 2018-02-13 RX ORDER — LEVOTHYROXINE SODIUM 0.05 MG/1
50 TABLET ORAL
Status: DISCONTINUED | OUTPATIENT
Start: 2018-02-13 | End: 2018-02-16 | Stop reason: HOSPADM

## 2018-02-13 RX ORDER — MULTIVITAMIN
1 TABLET ORAL DAILY
COMMUNITY

## 2018-02-13 RX ORDER — HYDROXYZINE HYDROCHLORIDE 25 MG/1
25 TABLET, FILM COATED ORAL 2 TIMES DAILY
Status: DISCONTINUED | OUTPATIENT
Start: 2018-02-13 | End: 2018-02-16 | Stop reason: HOSPADM

## 2018-02-13 RX ORDER — LACTULOSE 10 G/15ML
10 SOLUTION ORAL 2 TIMES DAILY
Status: DISCONTINUED | OUTPATIENT
Start: 2018-02-13 | End: 2018-02-15

## 2018-02-13 RX ORDER — OXYCODONE HYDROCHLORIDE 10 MG/1
10 TABLET ORAL EVERY 4 HOURS PRN
COMMUNITY

## 2018-02-13 RX ORDER — L.ACID,PARA/B.BIFIDUM/S.THERM 8B CELL
1 CAPSULE ORAL DAILY
Status: DISCONTINUED | OUTPATIENT
Start: 2018-02-13 | End: 2018-02-16 | Stop reason: HOSPADM

## 2018-02-13 RX ORDER — GABAPENTIN 100 MG/1
200 CAPSULE ORAL 2 TIMES DAILY
Status: DISCONTINUED | OUTPATIENT
Start: 2018-02-13 | End: 2018-02-16 | Stop reason: HOSPADM

## 2018-02-13 RX ORDER — L. ACIDOPHILUS/L.BULGARICUS 1MM CELL
1 TABLET ORAL 2 TIMES DAILY
COMMUNITY

## 2018-02-13 RX ORDER — MEGESTROL ACETATE 40 MG/ML
200 SUSPENSION ORAL 2 TIMES DAILY
COMMUNITY

## 2018-02-13 RX ORDER — PANTOPRAZOLE SODIUM 40 MG/1
40 TABLET, DELAYED RELEASE ORAL 2 TIMES DAILY
COMMUNITY

## 2018-02-13 RX ORDER — LEVOTHYROXINE SODIUM 0.05 MG/1
50 TABLET ORAL DAILY
COMMUNITY

## 2018-02-13 RX ORDER — FERROUS SULFATE 325(65) MG
325 TABLET ORAL DAILY
Status: DISCONTINUED | OUTPATIENT
Start: 2018-02-13 | End: 2018-02-16 | Stop reason: HOSPADM

## 2018-02-13 RX ORDER — BUSPIRONE HYDROCHLORIDE 10 MG/1
10 TABLET ORAL EVERY 12 HOURS SCHEDULED
Status: DISCONTINUED | OUTPATIENT
Start: 2018-02-13 | End: 2018-02-16 | Stop reason: HOSPADM

## 2018-02-13 RX ORDER — AMLODIPINE BESYLATE 10 MG/1
10 TABLET ORAL DAILY
Status: DISCONTINUED | OUTPATIENT
Start: 2018-02-13 | End: 2018-02-16 | Stop reason: HOSPADM

## 2018-02-13 RX ORDER — MEGESTROL ACETATE 40 MG/ML
200 SUSPENSION ORAL 2 TIMES DAILY
Status: DISCONTINUED | OUTPATIENT
Start: 2018-02-13 | End: 2018-02-16 | Stop reason: HOSPADM

## 2018-02-13 RX ORDER — HYDROXYZINE PAMOATE 25 MG/1
25 CAPSULE ORAL 2 TIMES DAILY
COMMUNITY

## 2018-02-13 RX ORDER — OXCARBAZEPINE 300 MG/1
150 TABLET, FILM COATED ORAL EVERY 12 HOURS SCHEDULED
Status: DISCONTINUED | OUTPATIENT
Start: 2018-02-13 | End: 2018-02-16 | Stop reason: HOSPADM

## 2018-02-13 RX ORDER — CASTOR OIL AND BALSAM, PERU 788; 87 MG/G; MG/G
OINTMENT TOPICAL 2 TIMES DAILY
Status: DISCONTINUED | OUTPATIENT
Start: 2018-02-13 | End: 2018-02-16 | Stop reason: HOSPADM

## 2018-02-13 RX ORDER — ONDANSETRON 4 MG/1
4 TABLET, FILM COATED ORAL EVERY 6 HOURS PRN
Status: DISCONTINUED | OUTPATIENT
Start: 2018-02-13 | End: 2018-02-16 | Stop reason: HOSPADM

## 2018-02-13 RX ORDER — CARVEDILOL 25 MG/1
25 TABLET ORAL 2 TIMES DAILY WITH MEALS
Status: DISCONTINUED | OUTPATIENT
Start: 2018-02-13 | End: 2018-02-16 | Stop reason: HOSPADM

## 2018-02-13 RX ADMIN — GABAPENTIN 200 MG: 100 CAPSULE ORAL at 10:01

## 2018-02-13 RX ADMIN — INSULIN DETEMIR 15 UNITS: 100 INJECTION, SOLUTION SUBCUTANEOUS at 20:42

## 2018-02-13 RX ADMIN — AMLODIPINE BESYLATE 10 MG: 10 TABLET ORAL at 09:34

## 2018-02-13 RX ADMIN — MEROPENEM 500 MG: 500 INJECTION, POWDER, FOR SOLUTION INTRAVENOUS at 20:39

## 2018-02-13 RX ADMIN — DOCUSATE SODIUM 200 MG: 100 CAPSULE, LIQUID FILLED ORAL at 10:33

## 2018-02-13 RX ADMIN — HYDROXYZINE 25 MG: 25 TABLET, FILM COATED ORAL at 20:40

## 2018-02-13 RX ADMIN — CASTOR OIL AND BALSAM, PERU: 788; 87 OINTMENT TOPICAL at 20:40

## 2018-02-13 RX ADMIN — CASTOR OIL AND BALSAM, PERU: 788; 87 OINTMENT TOPICAL at 11:03

## 2018-02-13 RX ADMIN — FERROUS SULFATE TAB 325 MG (65 MG ELEMENTAL FE) 325 MG: 325 (65 FE) TAB at 10:29

## 2018-02-13 RX ADMIN — CARVEDILOL 25 MG: 25 TABLET, FILM COATED ORAL at 17:15

## 2018-02-13 RX ADMIN — OXCARBAZEPINE 150 MG: 300 TABLET, FILM COATED ORAL at 20:40

## 2018-02-13 RX ADMIN — MEROPENEM 1 G: 1 INJECTION, POWDER, FOR SOLUTION INTRAVENOUS at 03:44

## 2018-02-13 RX ADMIN — LEVOTHYROXINE SODIUM 50 MCG: 50 TABLET ORAL at 08:29

## 2018-02-13 RX ADMIN — Medication 1 CAPSULE: at 08:30

## 2018-02-13 RX ADMIN — GABAPENTIN 200 MG: 100 CAPSULE ORAL at 20:40

## 2018-02-13 RX ADMIN — PANTOPRAZOLE SODIUM 40 MG: 40 TABLET, DELAYED RELEASE ORAL at 10:29

## 2018-02-13 RX ADMIN — MEGESTROL ACETATE 200 MG: 40 SUSPENSION ORAL at 20:40

## 2018-02-13 RX ADMIN — HYDROXYZINE 25 MG: 25 TABLET, FILM COATED ORAL at 09:34

## 2018-02-13 RX ADMIN — BUSPIRONE HYDROCHLORIDE 10 MG: 10 TABLET ORAL at 10:29

## 2018-02-13 RX ADMIN — ATORVASTATIN CALCIUM 10 MG: 10 TABLET, FILM COATED ORAL at 09:00

## 2018-02-13 RX ADMIN — LACTULOSE 10 G: 20 SOLUTION ORAL at 20:40

## 2018-02-13 RX ADMIN — MEROPENEM 500 MG: 500 INJECTION, POWDER, FOR SOLUTION INTRAVENOUS at 12:08

## 2018-02-13 RX ADMIN — Medication 1 TABLET: at 10:29

## 2018-02-13 RX ADMIN — OXCARBAZEPINE 150 MG: 300 TABLET, FILM COATED ORAL at 09:31

## 2018-02-13 RX ADMIN — HEPARIN SODIUM 5000 UNITS: 5000 INJECTION, SOLUTION INTRAVENOUS; SUBCUTANEOUS at 00:21

## 2018-02-13 RX ADMIN — HEPARIN SODIUM 5000 UNITS: 5000 INJECTION, SOLUTION INTRAVENOUS; SUBCUTANEOUS at 20:40

## 2018-02-13 RX ADMIN — CARVEDILOL 25 MG: 25 TABLET, FILM COATED ORAL at 08:31

## 2018-02-13 RX ADMIN — BUSPIRONE HYDROCHLORIDE 10 MG: 10 TABLET ORAL at 20:40

## 2018-02-13 RX ADMIN — PANTOPRAZOLE SODIUM 40 MG: 40 TABLET, DELAYED RELEASE ORAL at 20:40

## 2018-02-13 RX ADMIN — MEGESTROL ACETATE 200 MG: 40 SUSPENSION ORAL at 10:34

## 2018-02-13 RX ADMIN — LACTULOSE 10 G: 20 SOLUTION ORAL at 10:37

## 2018-02-13 NOTE — PLAN OF CARE
Problem: Infection, Risk/Actual (Adult)  Goal: Identify Related Risk Factors and Signs and Symptoms  Outcome: Ongoing (interventions implemented as appropriate)   02/13/18 0106   Infection, Risk/Actual   Infection, Risk/Actual: Related Risk Factors exposure to microbes;poor personal hygiene;skin integrity impairment;tissue perfusion altered;sleep disturbance   Signs and Symptoms (Infection, Risk/Actual) lab value changes;pain;cultures positive

## 2018-02-13 NOTE — PLAN OF CARE
Problem: Skin Integrity Impairment, Risk/Actual (Adult)  Goal: Identify Related Risk Factors and Signs and Symptoms  Outcome: Ongoing (interventions implemented as appropriate)    Goal: Skin Integrity/Wound Healing  Outcome: Ongoing (interventions implemented as appropriate)      Problem: Pain, Chronic (Adult)  Goal: Identify Related Risk Factors and Signs and Symptoms  Outcome: Ongoing (interventions implemented as appropriate)    Goal: Acceptable Pain Control/Comfort Level  Outcome: Ongoing (interventions implemented as appropriate)      Problem: Fall Risk (Adult)  Goal: Identify Related Risk Factors and Signs and Symptoms  Outcome: Ongoing (interventions implemented as appropriate)    Goal: Absence of Falls  Outcome: Ongoing (interventions implemented as appropriate)

## 2018-02-13 NOTE — ED PROVIDER NOTES
Subjective   HPI Comments: 62-year-old nursing home patient was sent from the nursing home patient was diagnosed on the 10th with a UTI started IV Zosyn on Saturday however she's been having low blood pressure time she has a history of end-stage liver disease hepatitis C also does have auditory hallucinations; patient is pleasant she says that she's here because she's been having some respiratory problems as well as some burning when she urinates; has no other complaints at this time    Upon review of pt labs from 2/10- shows > than 100,000 colonies of E coli/ citrobacter  Also 2 positive blood cultures of gram negative bacilli    Patient is a 62 y.o. female presenting with general illness.   History provided by:  Patient and nursing home  Illness   Associated symptoms: no abdominal pain, no chest pain, no fever and no headaches        Review of Systems   Constitutional: Negative.  Negative for activity change, appetite change, chills and fever.   HENT: Negative.  Negative for nosebleeds.    Eyes: Negative for discharge and itching.   Respiratory: Negative.    Cardiovascular: Negative.  Negative for chest pain.   Gastrointestinal: Negative.  Negative for abdominal pain.   Endocrine: Negative.  Negative for heat intolerance and polydipsia.   Genitourinary: Positive for frequency and urgency. Negative for dysuria.   Skin: Negative.    Neurological: Negative.  Negative for facial asymmetry and headaches.   Psychiatric/Behavioral: Negative.  Negative for agitation, behavioral problems and confusion.   All other systems reviewed and are negative.      Past Medical History:   Diagnosis Date   • Anterolisthesis     C5-C6 with perched facet   • C5 vertebral fracture    • CAD (coronary artery disease)    • Cardiac disorder    • Cirrhosis    • Constipation    • COPD (chronic obstructive pulmonary disease)    • Diabetes mellitus    • End stage liver disease    • Functional quadriplegia    • Glaucoma    • History of ESBL E.  coli infection 12/2017    Urine   • Hx of hepatitis C    • Hypertension    • Hypothyroidism    • MRSA cellulitis 12/2017    Leg   • Pseudomonas urinary tract infection 12/2017   • Urinary tract infection        No Known Allergies    Past Surgical History:   Procedure Laterality Date   • ABDOMINAL WALL MESH  REMOVAL     • HERNIA REPAIR     • HYSTERECTOMY     • KIDNEY SURGERY         Family History   Problem Relation Age of Onset   • Heart disease Other    • Diabetes Other    • Hypertension Other    • Cancer Other        Social History     Social History   • Marital status:      Spouse name: N/A   • Number of children: N/A   • Years of education: N/A     Social History Main Topics   • Smoking status: Current Every Day Smoker     Types: Cigarettes   • Smokeless tobacco: Never Used   • Alcohol use No   • Drug use: No   • Sexual activity: Defer     Other Topics Concern   • None     Social History Narrative   • None           Objective   Physical Exam   Constitutional: She is oriented to person, place, and time. She appears well-developed and well-nourished.   HENT:   Head: Normocephalic and atraumatic.   Right Ear: External ear normal.   Left Ear: External ear normal.   Nose: Nose normal.   Mouth/Throat: Oropharynx is clear and moist.   Eyes: EOM are normal.   Neck: Neck supple.   Cardiovascular: Normal rate and regular rhythm.  Exam reveals no friction rub.    No murmur heard.  Pulmonary/Chest: Effort normal and breath sounds normal.   Abdominal: Soft. Bowel sounds are normal. She exhibits no distension. There is no tenderness.   Musculoskeletal: Normal range of motion.   Neurological: She is alert and oriented to person, place, and time.   Skin: Skin is warm.   Psychiatric: She has a normal mood and affect. Her behavior is normal.   Nursing note and vitals reviewed.      Procedures         ED Course  ED Course                  MDM  Number of Diagnoses or Management Options  Bacteremia: new and requires  workup  Urinary tract infection without hematuria, site unspecified: new and requires workup     Amount and/or Complexity of Data Reviewed  Clinical lab tests: ordered and reviewed  Tests in the radiology section of CPT®: ordered and reviewed  Tests in the medicine section of CPT®: reviewed and ordered  Decide to obtain previous medical records or to obtain history from someone other than the patient: yes  Discuss the patient with other providers: yes  Independent visualization of images, tracings, or specimens: yes    Risk of Complications, Morbidity, and/or Mortality  Presenting problems: high  Diagnostic procedures: high  Management options: high    Patient Progress  Patient progress: stable      Final diagnoses:   Bacteremia   Urinary tract infection without hematuria, site unspecified            Liz Joy DO  02/13/18 8959

## 2018-02-13 NOTE — CONSULTS
INFECTIOUS DISEASE CONSULTATION REPORT      Referring Provider: Dr. Stovall  Reason for Consultation: Bacteremia      Principal problem: <principal problem not specified>    Subjective .     History of present illness:    As you well know Dr. Stovall Ms. Lily Ace is a 62 y.o. years old female nursing home resident at [facility] with past medical history significant for CAD, COPD, diabetes, end-stage liver disease, history of ESBL Escherichia coli of the urine, hepatitis, hypertension, hypothyroidism, history of Pseudomonas in the urine , who presented to [facility] Emergency Department on 2/12/2018 for admission due to positive blood cultures obtained on 2/10/18.  Her visit to the ER on 2/10/18 was concerning for UTI as well as patient received Rocephin and sent back to the nursing home where she followed up at the nursing home with Zosyn.    Infectious Disease consultation was requested for antimicrobial management.     History taken from: patient chart RN    Case was discussed with patient, nursing staff, primary care team and consulting provider    Review of Systems     Constitutional: States she feels bad all over.  Decreased appetite and mild side pain.    Eyes: no eye drainage, itching or redness.  HEENT: no mouth sores, dysphagia or nose bleed.  Respiratory: no for shortness of breath, cough or production of sputum.  Cardiovascular: no chest pain, no palpitations, no orthopnea.  Gastrointestinal: no nausea, vomiting or diarrhea. No abdominal pain, hematemesis or rectal bleeding.  Genitourinary: See history of present illness Hematologic/lymphatic: no lymph node abnormalities, no easy bruising or easy bleeding.  Musculoskeletal: no muscle or joint pain.  Skin: No rash and no itching.  Neurological: no loss of consciousness, no seizure, no headache.  Behavioral/Psych: no depression or suicidal ideation.  Endocrine: no hot flashes.  Immunologic: negative.    Past Medical History    Past Medical  "History:   Diagnosis Date   • Anterolisthesis     C5-C6 with perched facet   • C5 vertebral fracture    • CAD (coronary artery disease)    • Cardiac disorder    • Cirrhosis    • Constipation    • COPD (chronic obstructive pulmonary disease)    • Diabetes mellitus    • End stage liver disease    • Functional quadriplegia    • Glaucoma    • History of ESBL E. coli infection 12/2017    Urine   • Hx of hepatitis C    • Hypertension    • Hypothyroidism    • MRSA cellulitis 12/2017    Leg   • Pseudomonas urinary tract infection 12/2017   • Urinary tract infection        Past Surgical History    Past Surgical History:   Procedure Laterality Date   • ABDOMINAL WALL MESH  REMOVAL     • HERNIA REPAIR     • HYSTERECTOMY     • KIDNEY SURGERY         Family History    Family History   Problem Relation Age of Onset   • Heart disease Other    • Diabetes Other    • Hypertension Other    • Cancer Other        Social History    Social History   Substance Use Topics   • Smoking status: Current Every Day Smoker     Types: Cigarettes   • Smokeless tobacco: Never Used   • Alcohol use No       Allergies    Review of patient's allergies indicates no known allergies.    Objective     /62 (BP Location: Left arm, Patient Position: Lying)  Pulse 82  Temp 98.2 °F (36.8 °C) (Oral)   Resp 20  Ht 167.6 cm (66\")  Wt 59.6 kg (131 lb 6 oz)  SpO2 94%  BMI 21.2 kg/m2    Temp:  [98.1 °F (36.7 °C)-99.3 °F (37.4 °C)] 98.2 °F (36.8 °C)        Intake/Output Summary (Last 24 hours) at 02/13/18 1037  Last data filed at 02/13/18 0300   Gross per 24 hour   Intake              350 ml   Output                0 ml   Net              350 ml         Physical Exam:      General Appearance:    Alert, cooperative, in no acute distress   Head:    Normocephalic, without obvious abnormality, atraumatic   Eyes:            Lids and lashes normal, conjunctivae and sclerae normal, no   icterus, no pallor, corneas clear, PERRLA   Ears:    Ears appear intact with " no abnormalities noted   Throat:   No oral lesions, no thrush, oral mucosa moist   Neck:   No adenopathy, supple, trachea midline, no thyromegaly, no   carotid bruit, no JVD   Back:     No tenderness to percussion or palpation, range of motion   normal   Lungs:     Clear to auscultation,respirations regular, even and unlabored. No wheezing, no ronchi and no crackles.    Heart:    Regular rhythm and normal rate, normal S1 and S2, no            murmur, no gallop, no rub, no click   Chest Wall:    No abnormalities observed   Abdomen:   Mild side pain    Rectal:     Deferred   Extremities:   Moves all extremities well, no edema, no cyanosis, no             redness   Pulses:   Pulses palpable and equal bilaterally   Skin:   No bleeding, bruising or rash   Lymph nodes:   No palpable adenopathy   Neurologic:   Awake, alert and oriented x 3. Following commands.       Results:      Results from last 7 days  Lab Units 02/13/18  0804 02/12/18  2026 02/10/18  1737   WBC 10*3/mm3 4.14* 4.60 7.33     Lab Results   Component Value Date    NEUTROABS 2.15 02/13/2018         Results from last 7 days  Lab Units 02/13/18  0804   CREATININE mg/dL 1.48*         Results from last 7 days  Lab Units 02/13/18  0804   CRP mg/dL 2.84*       Imaging Results (last 24 hours)     Procedure Component Value Units Date/Time    XR Chest 1 View [[id number]] Collected:  02/13/18 0723     Updated:  02/13/18 0826    Narrative:       XR CHEST 1 VIEW-     CLINICAL INDICATION: soa          COMPARISON: 02/10/2018      TECHNIQUE: Single frontal view of the chest.     FINDINGS:     There is no focal alveolar infiltrate or effusion.  The cardiac silhouette is normal. The pulmonary vasculature is  unremarkable.  There is no evidence of an acute osseous abnormality.   There are no suspicious-appearing parenchymal soft tissue nodules.            Impression:       No evidence of active or acute cardiopulmonary disease on today's chest  radiograph.         This report  was finalized on 2/13/2018 8:24 AM by Dr. Jose Martinez MD.               Cultures:    Blood Culture   Date Value Ref Range Status   02/12/2018 No growth at less than 24 hours  Preliminary   02/12/2018 No growth at less than 24 hours  Preliminary   02/10/2018 (A)  Preliminary    Gram Negative Bacilli, Morphology consistent with Escherichia / Citrobacter   02/10/2018 (A)  Preliminary    Gram Negative Bacilli, Morphology consistent with Escherichia / Citrobacter     Urine Culture   Date Value Ref Range Status   02/10/2018 (A)  Preliminary    >100,000 CFU/mL Gram Negative Bacilli, Morphology consistent with Escherichia / Citrobacter       Results Review:    I have personally reviewed laboratory data, culture results, radiology studies and antimicrobial therapy.    Hospital Medications (active)       Dose Frequency Start End    amLODIPine (NORVASC) tablet 10 mg 10 mg Daily 2/13/2018     Sig - Route: Take 1 tablet by mouth Daily. - Oral    atorvastatin (LIPITOR) tablet 10 mg 10 mg Daily 2/13/2018     Sig - Route: Take 1 tablet by mouth Daily. - Oral    busPIRone (BUSPAR) tablet 10 mg 10 mg Every 12 Hours Scheduled 2/13/2018     Sig - Route: Take 1 tablet by mouth Every 12 (Twelve) Hours. - Oral    carvedilol (COREG) tablet 25 mg 25 mg 2 Times Daily With Meals 2/13/2018     Sig - Route: Take 1 tablet by mouth 2 (Two) Times a Day With Meals. - Oral    docusate sodium (COLACE) capsule 200 mg 200 mg Daily 2/13/2018     Sig - Route: Take 2 capsules by mouth Daily. - Oral    ferrous sulfate tablet 325 mg 325 mg Daily 2/13/2018     Sig - Route: Take 1 tablet by mouth Daily. - Oral    gabapentin (NEURONTIN) capsule 200 mg 200 mg 2 Times Daily 2/13/2018     Sig - Route: Take 2 capsules by mouth 2 (Two) Times a Day. - Oral    heparin (porcine) 5000 UNIT/ML injection 5,000 Units 5,000 Units Every 12 Hours Scheduled 2/13/2018     Sig - Route: Inject 1 mL under the skin Every 12 (Twelve) Hours. - Subcutaneous    hydrOXYzine (ATARAX)  tablet 25 mg 25 mg 2 Times Daily 2/13/2018     Sig - Route: Take 1 tablet by mouth 2 (Two) Times a Day. - Oral    insulin detemir (LEVEMIR) injection 15 Units 15 Units Nightly 2/13/2018     Sig - Route: Inject 15 Units under the skin Every Night. - Subcutaneous    lactobacillus acidophilus (RISAQUAD) capsule 1 capsule 1 capsule Daily 2/13/2018     Sig - Route: Take 1 capsule by mouth Daily. - Oral    lactulose (CHRONULAC) 10 GM/15ML solution 10 g 10 g 2 Times Daily 2/13/2018     Sig - Route: Take 15 mL by mouth 2 (Two) Times a Day. - Oral    levothyroxine (SYNTHROID, LEVOTHROID) tablet 50 mcg 50 mcg Every Early Morning 2/13/2018     Sig - Route: Take 1 tablet by mouth Every Morning. - Oral    megestrol (MEGACE) 40 MG/ML suspension 200 mg 200 mg 2 Times Daily 2/13/2018     Sig - Route: Take 5 mL by mouth 2 (Two) Times a Day. - Oral    meropenem (MERREM) 1 g/100 mL 0.9% NS VTB (mbp) 1 g Once 2/13/2018 2/13/2018    Sig - Route: Infuse 100 mL into a venous catheter 1 (One) Time. - Intravenous    meropenem (MERREM) 500mg/100 mL 0.9% NS IVPB (mbp) 500 mg Every 8 Hours 2/13/2018 2/20/2018    Sig - Route: Infuse 100 mL into a venous catheter Every 8 (Eight) Hours. - Intravenous    multivitamin (DAILY DARRELL) tablet 1 tablet 1 tablet Daily 2/13/2018     Sig - Route: Take 1 tablet by mouth Daily. - Oral    nitroglycerin (NITROSTAT) SL tablet 0.4 mg 0.4 mg Every 5 Minutes PRN 2/12/2018     Sig - Route: Place 1 tablet under the tongue Every 5 (Five) Minutes As Needed for Chest Pain (if systolic BP greater than 100 mm/Hg.). - Sublingual    OXcarbazepine (TRILEPTAL) tablet 150 mg 150 mg Every 12 Hours Scheduled 2/13/2018     Sig - Route: Take 0.5 tablets by mouth Every 12 (Twelve) Hours. - Oral    oxyCODONE (ROXICODONE) immediate release tablet 5 mg 5 mg Every 4 Hours PRN 2/13/2018     Sig - Route: Take 1 tablet by mouth Every 4 (Four) Hours As Needed for Moderate Pain . - Oral    pantoprazole (PROTONIX) EC tablet 40 mg 40 mg 2  Times Daily 2/13/2018     Sig - Route: Take 1 tablet by mouth 2 (Two) Times a Day. - Oral    piperacillin-tazobactam (ZOSYN) 4.5 g/100 mL 0.9% NS IVPB (mbp) 4.5 g Once 2/12/2018 2/12/2018    Sig - Route: Infuse 100 mL into a venous catheter 1 (One) Time. - Intravenous    sodium chloride 0.9 % bolus 500 mL 500 mL Once 2/12/2018 2/12/2018    Sig - Route: Infuse 500 mL into a venous catheter 1 (One) Time. - Intravenous    sodium chloride 0.9 % flush 1-10 mL 1-10 mL As Needed 2/12/2018     Sig - Route: Infuse 1-10 mL into a venous catheter As Needed for Line Care. - Intravenous    vancomycin (VANCOCIN) 1,250 mg in sodium chloride 0.9 % 250 mL IVPB 20 mg/kg × 59 kg Once 2/12/2018 2/13/2018    Sig - Route: Infuse 1,250 mg into a venous catheter 1 (One) Time. - Intravenous    aspirin EC tablet 81 mg (Discontinued) 81 mg Daily 2/13/2018 2/13/2018    Sig - Route: Take 1 tablet by mouth Daily. - Oral    Reason for Discontinue: Discontinued by another clinician    cefTRIAXone (ROCEPHIN) 1 g/100 mL 0.9% NS (MBP) (Discontinued) 1 g Once 2/12/2018 2/12/2018    Sig - Route: Infuse 100 mL into a venous catheter 1 (One) Time. - Intravenous    diltiaZEM CD (CARDIZEM CD) 24 hr capsule 180 mg (Discontinued) 180 mg Every 24 Hours Scheduled 2/13/2018 2/13/2018    Sig - Route: Take 1 capsule by mouth Daily. - Oral    Reason for Discontinue: Discontinued by another clinician    meropenem (MERREM) 1 g/100 mL 0.9% NS VTB (mbp) (Discontinued) 1 g Every 8 Hours 2/13/2018 2/13/2018    Sig - Route: Infuse 100 mL into a venous catheter Every 8 (Eight) Hours. - Intravenous    Reason for Discontinue: Dose adjustment              Assessment/Plan     ASSESSMENT:    1. Bacteremia  2.  Acute pyelonephritis    PLAN:    Patient presented to [facility] Emergency Department on 2/12/2018 for admission due to positive blood cultures obtained on 2/10/18.  Her visit to the ER on 2/10/18 was concerning for UTI as well as patient received Rocephin and  sent back to the nursing home where she followed up at the nursing home with Zosyn.    With history of prior ESBL infection, would recommend to continue meropenem monotherapy for now.    Hope to de-escalate antibiotic therapy when susceptibilities are available.    If urine culture shows no evidence of ESBL, would recommend to DC isolation after CHD bath.    We will continue to follow closely.    Patient's findings and recommendations were discussed with patient, nursing staff, primary care team and consulting provider    Code Status: Conditional Code     Scribed for ANICETO Victoria  02/13/18  10:37 AM     Physician Attestation:    The documentation recorded by the scribe accurately reflects the service I personally performed and the decisions made by me.    Leah Cedillo MD  Infectious Diseases  02/13/18  1:12 PM

## 2018-02-13 NOTE — ED NOTES
Patient's brief wet prior to being transported to floor, barb care performed at this time, clean brief placed on patient.     Chani Abdi RN  02/12/18 2774

## 2018-02-13 NOTE — PLAN OF CARE
Problem: Patient Care Overview (Adult)  Goal: Discharge Needs Assessment  Outcome: Ongoing (interventions implemented as appropriate)  Discharge Planning Assessment  YANI Regalado     Patient Name: Lily Ace  MRN: [id number]  Today's Date: 2/13/2018    Admit Date: 2/12/2018          Discharge Needs Assessment       02/13/18 1107    Discharge Needs Assessment    Discharge Disposition skilled nursing facility   SS contacted [facility] per Robyn who states pt has a 14 day skilled bed hold.             Discharge Plan       02/13/18 1108    Case Management/Social Work Plan    Plan Pt has a 14 day skilled bed hold at [facility]. SS to follow and assist as needed with discharge planning.         Discharge Placement     Facility/Agency Request Status Selected? Address Phone Number Fax Number    [facility] Pending - No Request Sent     [address] [place] [address] [phone]                Demographic Summary       02/13/18 1107    Referral Information    Referral Source nursing    Reason For Consult --   SS received consult nursing home. Pt was admitted from [facility].      Mary Jane Ragland

## 2018-02-13 NOTE — H&P
[facility] HOSPITALIST HISTORY AND PHYSICAL    Patient Identification:  Name:  Lily Ace  Age:  62 y.o.  Sex:  female  :  1955  MRN:  [id number]   Visit Number:  [id number]  Primary Care Physician:  Sandra Aponte MD     Chief complaint:  Told to come back to the ED due to positive blood cultures    History of presenting illness:  62 y.o. female who presented to [facility] emergency Department positive blood cultures.  The patient seems confused to me during my examination of her and she seems confused to her nurse, Mayuri.  The patient does not have a history of dementia but because she appears confused and I'm unable to obtain a history from her.  The patient initially came to the emergency department from [facility] [place]10/2018 with complaints of fever of 103 at the nursing home.  Her urinalysis was suspicious for urinary tract infection.  She was given Rocephin and sent back to the halfway; a urine culture and blood cultures were obtained.  At the nursing home, the patient was started on Zosyn.  Blood cultures started growing a gram-negative bacilli consistent with Escherichia or Citrobacter, 2 out of 2 blood cultures; this is the same type of bacteria that the urine culture is also growing from this same emergency department visit.  The emergency department called the nursing home and the patient was sent due to the bacteremia.  When asked about the above, the patient is unable to really tell me about the details.  She notes that she had a fever at the nursing home but she couldn't really tell me about her liver disease or what is being done about it.  ---------------------------------------------------------------------------------------------------------------------   Review of Systems   Unable to perform ROS: Mental status change     ---------------------------------------------------------------------------------------------------------------------   Past Medical History:   Diagnosis Date   • Anterolisthesis     C5-C6 with perched facet   • C5 vertebral fracture    • CAD (coronary artery disease)    • Cardiac disorder    • Cirrhosis    • Constipation    • COPD (chronic obstructive pulmonary disease)    • Diabetes mellitus    • End stage liver disease    • Glaucoma    • History of ESBL E. coli infection 12/2017    Urine   • Hx of hepatitis C    • Hypertension    • Hypothyroidism    • MRSA cellulitis 12/2017    Leg   • Pseudomonas urinary tract infection 12/2017   • Urinary tract infection      Past Surgical History:   Procedure Laterality Date   • ABDOMINAL WALL MESH  REMOVAL     • HERNIA REPAIR     • HYSTERECTOMY     • KIDNEY SURGERY       Family History   Problem Relation Age of Onset   • Heart disease Other    • Diabetes Other    • Hypertension Other    • Cancer Other      Social History   • Marital status:      Social History Main Topics   • Smoking status: Current Every Day Smoker     Types: Cigarettes   • Smokeless tobacco: Never Used   • Alcohol use No   • Drug use: No   • Sexual activity: Defer   ---------------------------------------------------------------------------------------------------------------------   Allergies:  Review of patient's allergies indicates no known allergies.  ---------------------------------------------------------------------------------------------------------------------   Prior to Admission Medications     Prescriptions Last Dose Informant Patient Reported? Taking?    acidophilus (FLORANEX) tablet tablet 2/12/2018 Nursing Home Yes Yes    Take 1 tablet by mouth 2 (Two) Times a Day.    amLODIPine (NORVASC) 10 MG tablet 2/12/2018 Nursing Home Yes Yes    Take 10 mg by mouth Daily.    busPIRone (BUSPAR) 10 MG tablet 2/12/2018 [facility] Home Yes Yes    Take 10 mg by mouth Every 12 (Twelve) Hours.     carvedilol (COREG) 25 MG tablet 2/12/2018 Nursing Home Yes Yes    Take 25 mg by mouth 2 (Two) Times a Day.    docusate sodium (COLACE) 250 MG capsule 2/12/2018 [facility] Yes Yes    Take 250 mg by mouth Daily.    DORIPENEM IV 2/12/2018 Nursing Home Yes Yes    Infuse 500 mg into a venous catheter Every 8 (Eight) Hours.    ferrous sulfate 325 (65 FE) MG tablet 2/12/2018 Nursing Home Yes Yes    Take 325 mg by mouth Daily.    gabapentin (NEURONTIN) 400 MG capsule 2/12/2018 [facility] Yes Yes    Take 400 mg by mouth 2 (Two) Times a Day.    hydrALAZINE (APRESOLINE) 25 MG tablet 2/12/2018 Nursing Home Yes Yes    Take 25 mg by mouth 2 (Two) Times a Day.    hydrOXYzine (VISTARIL) 25 MG capsule 2/12/2018 [facility] Yes Yes    Take 25 mg by mouth 2 (Two) Times a Day.    insulin aspart (novoLOG) 100 UNIT/ML injection 2/11/2018 Nursing [facility] Yes Yes    Inject 8-10 Units under the skin 3 (Three) Times a Day.    lactulose (CHRONULAC) 10 GM/15ML solution 2/12/2018 [facility] Home Yes Yes    Take 10 g by mouth 2 (Two) Times a Day.    levothyroxine (SYNTHROID, LEVOTHROID) 50 MCG tablet 2/12/2018 Nursing Home Yes Yes    Take 50 mcg by mouth Daily.    linaclotide (LINZESS) 145 MCG capsule capsule 2/12/2018 [facility] Yes Yes    Take 145 mcg by mouth Daily.    megestrol (MEGACE) 40 MG/ML suspension 2/12/2018 Nursing Home Yes Yes    Take 200 mg by mouth 2 (Two) Times a Day.    multivitamin (DAILY DARRELL) tablet tablet 2/12/2018 [facility] Home Yes Yes    Take 1 tablet by mouth Daily.    OXcarbazepine (TRILEPTAL) 150 MG tablet 2/12/2018 Nursing Home Yes Yes    Take 150 mg by mouth 2 (Two) Times a Day.    oxyCODONE (ROXICODONE) 10 MG tablet 2/9/2018 Nursing Home Yes Yes    Take 10 mg by mouth Every 4 (Four) Hours As Needed for Moderate Pain .    pantoprazole (PROTONIX) 40 MG EC tablet 2/12/2018 Nursing Home Yes Yes    Take 40 mg by mouth 2 (Two) Times a Day.    spironolactone (ALDACTONE) 100 MG tablet 2/12/2018 Nursing Home Yes Yes    Take  100 mg by mouth Daily.    atorvastatin (LIPITOR) 10 MG tablet 2/11/2018 Nursing Home Yes No    Take 10 mg by mouth Every Night.    Insulin Glargine (LANTUS SOLOSTAR) 100 UNIT/ML solution pen-injector 2/11/2018 Nursing Home Yes No    Inject 15 Units under the skin Every Night.   ---------------------------------------------------------------------------------------------------------------------   Vital Signs:  Temp:  [98.7 °F (37.1 °C)-99.3 °F (37.4 °C)] 98.7 °F (37.1 °C)  Heart Rate:  [60-63] 61  Resp:  [18] 18  BP: (127-146)/() 136/72  Last 3 weights    02/12/18  1901 02/12/18  2339   Weight: 59 kg (130 lb) 59.6 kg (131 lb 6 oz)     Body mass index is 21.2 kg/(m^2).  ---------------------------------------------------------------------------------------------------------------------   Physical Exam:  Constitutional:  Well-developed and well-nourished.  No respiratory distress.  Seems confused.    HENT:  Head: Normocephalic and atraumatic.  Mouth:  Moist mucous membranes.    Eyes:  Conjunctivae and EOM are normal.  Pupils are equal, round, and reactive to light.  No scleral icterus.  Neck:  Neck supple.  No JVD present.    Cardiovascular:  Normal rate, regular rhythm and normal heart sounds with no murmur.  Pulmonary/Chest:  No respiratory distress, no wheezes, no crackles, with normal breath sounds and good air movement.  Abdominal:  Soft.  Bowel sounds are normal.  Distension and no tenderness to palpation.  There is a fluid wave.   Musculoskeletal:  No edema, no tenderness, and no deformity.  No red or swollen joints anywhere.    Neurological:  Alert and oriented to person, place, and year but not to her medical history.  No cranial nerve deficit.  No tongue deviation.  No facial droop.  No slurred speech.  Can follow simple commands.  Skin:  Skin is warm and dry.  No rash noted.  No pallor.   Peripheral vascular:  No edema and strong pulses on all 4 extremities.  Genitourinary:  No monte catheter in  place.  ---------------------------------------------------------------------------------------------------------------------  EKG:  NS, HR with heart rate of 61, QTc 442 ms.  Telemetry:  NS in the 60's.  I have personally looked at both the EKG and the telemetry strips.  ---------------------------------------------------------------------------------------------------------------------  Results from last 7 days  Lab Units 02/12/18  2026 02/10/18  1737   LACTATE mmol/L 0.7 0.7   WBC 10*3/mm3 4.60 7.33   HEMOGLOBIN g/dL 7.3* 7.2*   HEMATOCRIT % 23.0* 22.0*   MCV fL 91.3 90.9   MCHC g/dL 31.7* 32.7*   PLATELETS 10*3/mm3 101* 61*   INR  0.99  --      Results from last 7 days  Lab Units 02/12/18  2026 02/10/18  1737   SODIUM mmol/L 136 130*   POTASSIUM mmol/L 5.0 4.8   CHLORIDE mmol/L 112 104   CO2 mmol/L 16.2* 18.5*   BUN mg/dL 55* 64*   CREATININE mg/dL 1.73* 1.93*   EGFR IF NONAFRICN AM mL/min/1.73 30* 26*   CALCIUM mg/dL 8.3 8.4   GLUCOSE mg/dL 182* 144*   ALBUMIN g/dL 3.10* 3.20*   BILIRUBIN mg/dL 0.1* 0.3   ALK PHOS U/L 90 72   AST (SGOT) U/L 34* 25   ALT (SGPT) U/L 25 22   Estimated Creatinine Clearance: 31.3 mL/min (by C-G formula based on Cr of 1.73).     Ammonia   Date Value Ref Range Status   02/10/2018 20 11 - 51 umol/L Final     Results from last 7 days  Lab Units 02/12/18 2026   TROPONIN I ng/mL <0.006       Lab Results   Component Value Date    HGBA1C 6.20 (H) 11/30/2017     Lab Results   Component Value Date    TSH 4.149 02/07/2018    FREET4 1.12 11/30/2017     Blood Culture   Date Value Ref Range Status   02/10/2018 (A)  Preliminary    Gram Negative Bacilli, Morphology consistent with Escherichia / Citrobacter   02/10/2018 (A)  Preliminary    Gram Negative Bacilli, Morphology consistent with Escherichia / Citrobacter     Urine Culture   Date Value Ref Range Status   02/10/2018 (A)  Preliminary    >100,000 CFU/mL Gram Negative Bacilli, Morphology consistent with Escherichia / Citrobacter     Past  Cultures:       I have personally looked at the labs and they are stated above.  ---------------------------------------------------------------------------------------------------------------------  Imaging Results (last 7 days)     Procedure Component Value Units Date/Time    XR Chest 1 View [[id number]]:  I don't see any infiltrates.  I await the final radiology read.   Updated:  02/12/18 2055        I have personally reviewed the radiology images and read the final radiology report.  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  The patient used to have an indwelling [place] catheter but this has been removed for at least a couple months and she now uses adult briefs.  We will consults infectious disease because of the bacteremia.  Transthoracic echo has been ordered to evaluate her for possible endocarditis.  We cannot give her in the heparin for DVT prophylaxis due to her thrombocytopenia.  We will repeat her blood work in the morning.  For now, I'm holding her spironolactone and may restart it next day or 2 depending on how her blood pressures do at she does have the infection.    Jamel Stovall MD  02/13/18  3:04 AM

## 2018-02-13 NOTE — PROGRESS NOTES
Discharge Planning Assessment   Fabricio     Patient Name: Lily Ace  MRN: [id number]  Today's Date: 2/13/2018    Admit Date: 2/12/2018          Discharge Needs Assessment       02/13/18 1107    Discharge Needs Assessment    Discharge Disposition skilled nursing facility   SS contacted [facility] per Robyn who states pt has a 14 day skilled bed hold.             Discharge Plan       02/13/18 1108    Case Management/Social Work Plan    Plan Pt has a 14 day skilled bed hold at [facility]. SS to follow and assist as needed with discharge planning.         Discharge Placement     Facility/Agency Request Status Selected? Address Phone Number Fax Number    [facility] Pending - No Request Sent     [id number] [place] [address] [phone]                Demographic Summary       02/13/18 1107    Referral Information    Referral Source nursing    Reason For Consult --   SS received consult nursing home. Pt was admitted from [facility].      Mary Jane Ragland

## 2018-02-14 ENCOUNTER — APPOINTMENT (OUTPATIENT)
Dept: GENERAL RADIOLOGY | Facility: HOSPITAL | Age: 63
End: 2018-02-14

## 2018-02-14 LAB
ALBUMIN SERPL-MCNC: 2.6 G/DL (ref 3.4–4.8)
ALBUMIN/GLOB SERPL: 0.9 G/DL (ref 1.5–2.5)
ALP SERPL-CCNC: 67 U/L (ref 35–104)
ALT SERPL W P-5'-P-CCNC: 18 U/L (ref 10–36)
ANION GAP SERPL CALCULATED.3IONS-SCNC: 6.1 MMOL/L (ref 3.6–11.2)
AST SERPL-CCNC: 24 U/L (ref 10–30)
BACTERIA SPEC AEROBE CULT: ABNORMAL
BASOPHILS # BLD AUTO: 0.03 10*3/MM3 (ref 0–0.3)
BASOPHILS NFR BLD AUTO: 0.6 % (ref 0–2)
BILIRUB SERPL-MCNC: 0.1 MG/DL (ref 0.2–1.8)
BUN BLD-MCNC: 31 MG/DL (ref 7–21)
BUN/CREAT SERPL: 26.1 (ref 7–25)
CALCIUM SPEC-SCNC: 8.1 MG/DL (ref 7.7–10)
CHLORIDE SERPL-SCNC: 116 MMOL/L (ref 99–112)
CO2 SERPL-SCNC: 16.9 MMOL/L (ref 24.3–31.9)
CREAT BLD-MCNC: 1.19 MG/DL (ref 0.43–1.29)
CRP SERPL-MCNC: 1.4 MG/DL (ref 0–0.99)
DEPRECATED RDW RBC AUTO: 48 FL (ref 37–54)
EOSINOPHIL # BLD AUTO: 0.67 10*3/MM3 (ref 0–0.7)
EOSINOPHIL NFR BLD AUTO: 12.4 % (ref 0–5)
ERYTHROCYTE [DISTWIDTH] IN BLOOD BY AUTOMATED COUNT: 14.5 % (ref 11.5–14.5)
GFR SERPL CREATININE-BSD FRML MDRD: 46 ML/MIN/1.73
GLOBULIN UR ELPH-MCNC: 2.9 GM/DL
GLUCOSE BLD-MCNC: 85 MG/DL (ref 70–110)
GLUCOSE BLDC GLUCOMTR-MCNC: 188 MG/DL (ref 70–130)
GLUCOSE BLDC GLUCOMTR-MCNC: 76 MG/DL (ref 70–130)
HCT VFR BLD AUTO: 23.1 % (ref 37–47)
HGB BLD-MCNC: 7.2 G/DL (ref 12–16)
IMM GRANULOCYTES # BLD: 0.21 10*3/MM3 (ref 0–0.03)
IMM GRANULOCYTES NFR BLD: 3.9 % (ref 0–0.5)
LYMPHOCYTES # BLD AUTO: 1.07 10*3/MM3 (ref 1–3)
LYMPHOCYTES NFR BLD AUTO: 19.9 % (ref 21–51)
MCH RBC QN AUTO: 28.3 PG (ref 27–33)
MCHC RBC AUTO-ENTMCNC: 31.2 G/DL (ref 33–37)
MCV RBC AUTO: 90.9 FL (ref 80–94)
MONOCYTES # BLD AUTO: 0.95 10*3/MM3 (ref 0.1–0.9)
MONOCYTES NFR BLD AUTO: 17.6 % (ref 0–10)
NEUTROPHILS # BLD AUTO: 2.46 10*3/MM3 (ref 1.4–6.5)
NEUTROPHILS NFR BLD AUTO: 45.6 % (ref 30–70)
NRBC BLD MANUAL-RTO: 0 /100 WBC (ref 0–0)
OSMOLALITY SERPL CALC.SUM OF ELEC: 283.3 MOSM/KG (ref 273–305)
PLATELET # BLD AUTO: 133 10*3/MM3 (ref 130–400)
PMV BLD AUTO: 10.2 FL (ref 6–10)
POTASSIUM BLD-SCNC: 4.3 MMOL/L (ref 3.5–5.3)
PROT SERPL-MCNC: 5.5 G/DL (ref 6–8)
RBC # BLD AUTO: 2.54 10*6/MM3 (ref 4.2–5.4)
SODIUM BLD-SCNC: 139 MMOL/L (ref 135–153)
WBC NRBC COR # BLD: 5.39 10*3/MM3 (ref 4.5–12.5)

## 2018-02-14 PROCEDURE — 25010000002 HEPARIN (PORCINE) PER 1000 UNITS: Performed by: INTERNAL MEDICINE

## 2018-02-14 PROCEDURE — 25010000002 MEROPENEM: Performed by: INTERNAL MEDICINE

## 2018-02-14 PROCEDURE — 86140 C-REACTIVE PROTEIN: CPT | Performed by: INTERNAL MEDICINE

## 2018-02-14 PROCEDURE — 25010000002 MEROPENEM

## 2018-02-14 PROCEDURE — 85025 COMPLETE CBC W/AUTO DIFF WBC: CPT | Performed by: INTERNAL MEDICINE

## 2018-02-14 PROCEDURE — 74018 RADEX ABDOMEN 1 VIEW: CPT

## 2018-02-14 PROCEDURE — 74019 RADEX ABDOMEN 2 VIEWS: CPT | Performed by: RADIOLOGY

## 2018-02-14 PROCEDURE — 80053 COMPREHEN METABOLIC PANEL: CPT | Performed by: INTERNAL MEDICINE

## 2018-02-14 PROCEDURE — 85007 BL SMEAR W/DIFF WBC COUNT: CPT | Performed by: INTERNAL MEDICINE

## 2018-02-14 PROCEDURE — 63710000001 INSULIN DETEMIR PER 5 UNITS: Performed by: INTERNAL MEDICINE

## 2018-02-14 PROCEDURE — 94799 UNLISTED PULMONARY SVC/PX: CPT

## 2018-02-14 PROCEDURE — 99232 SBSQ HOSP IP/OBS MODERATE 35: CPT | Performed by: INTERNAL MEDICINE

## 2018-02-14 PROCEDURE — 82962 GLUCOSE BLOOD TEST: CPT

## 2018-02-14 RX ADMIN — OXCARBAZEPINE 150 MG: 300 TABLET, FILM COATED ORAL at 20:11

## 2018-02-14 RX ADMIN — BUSPIRONE HYDROCHLORIDE 10 MG: 10 TABLET ORAL at 20:11

## 2018-02-14 RX ADMIN — HYDROXYZINE 25 MG: 25 TABLET, FILM COATED ORAL at 09:15

## 2018-02-14 RX ADMIN — CARVEDILOL 25 MG: 25 TABLET, FILM COATED ORAL at 09:16

## 2018-02-14 RX ADMIN — MEROPENEM 1 G: 1 INJECTION, POWDER, FOR SOLUTION INTRAVENOUS at 13:00

## 2018-02-14 RX ADMIN — HYDROXYZINE 25 MG: 25 TABLET, FILM COATED ORAL at 20:11

## 2018-02-14 RX ADMIN — CASTOR OIL AND BALSAM, PERU: 788; 87 OINTMENT TOPICAL at 09:16

## 2018-02-14 RX ADMIN — ATORVASTATIN CALCIUM 10 MG: 10 TABLET, FILM COATED ORAL at 09:15

## 2018-02-14 RX ADMIN — MEGESTROL ACETATE 200 MG: 40 SUSPENSION ORAL at 20:11

## 2018-02-14 RX ADMIN — DOCUSATE SODIUM 200 MG: 100 CAPSULE, LIQUID FILLED ORAL at 09:15

## 2018-02-14 RX ADMIN — CASTOR OIL AND BALSAM, PERU: 788; 87 OINTMENT TOPICAL at 20:11

## 2018-02-14 RX ADMIN — MEROPENEM 1 G: 1 INJECTION, POWDER, FOR SOLUTION INTRAVENOUS at 23:59

## 2018-02-14 RX ADMIN — BUSPIRONE HYDROCHLORIDE 10 MG: 10 TABLET ORAL at 09:15

## 2018-02-14 RX ADMIN — LEVOTHYROXINE SODIUM 50 MCG: 50 TABLET ORAL at 05:45

## 2018-02-14 RX ADMIN — GABAPENTIN 200 MG: 100 CAPSULE ORAL at 09:15

## 2018-02-14 RX ADMIN — CARVEDILOL 25 MG: 25 TABLET, FILM COATED ORAL at 17:24

## 2018-02-14 RX ADMIN — MEROPENEM 500 MG: 500 INJECTION, POWDER, FOR SOLUTION INTRAVENOUS at 05:45

## 2018-02-14 RX ADMIN — Medication 1 CAPSULE: at 09:15

## 2018-02-14 RX ADMIN — INSULIN DETEMIR 15 UNITS: 100 INJECTION, SOLUTION SUBCUTANEOUS at 20:17

## 2018-02-14 RX ADMIN — HEPARIN SODIUM 5000 UNITS: 5000 INJECTION, SOLUTION INTRAVENOUS; SUBCUTANEOUS at 09:16

## 2018-02-14 RX ADMIN — AMLODIPINE BESYLATE 10 MG: 10 TABLET ORAL at 09:15

## 2018-02-14 RX ADMIN — FERROUS SULFATE TAB 325 MG (65 MG ELEMENTAL FE) 325 MG: 325 (65 FE) TAB at 09:15

## 2018-02-14 RX ADMIN — MEGESTROL ACETATE 200 MG: 40 SUSPENSION ORAL at 09:16

## 2018-02-14 RX ADMIN — PANTOPRAZOLE SODIUM 40 MG: 40 TABLET, DELAYED RELEASE ORAL at 09:15

## 2018-02-14 RX ADMIN — Medication 1 TABLET: at 09:15

## 2018-02-14 RX ADMIN — OXCARBAZEPINE 150 MG: 300 TABLET, FILM COATED ORAL at 09:15

## 2018-02-14 RX ADMIN — GABAPENTIN 200 MG: 100 CAPSULE ORAL at 20:11

## 2018-02-14 RX ADMIN — PANTOPRAZOLE SODIUM 40 MG: 40 TABLET, DELAYED RELEASE ORAL at 20:11

## 2018-02-14 NOTE — PROGRESS NOTES
Brief Note:    The patient was admitted by Dr. Stovall earlier today for bacteremia and UTI.  The patient was seen this afternoon is resting in bed.  The patient is awake And able to answer some questions.  The patient complains of nausea.  She denies chest pain or trouble breathing.    Vital signs are stable.  Renal function has improved.  Cardiac enzymes are unremarkable.  Her CRP is minimally elevated at 2.84.  Hemoglobin is chronically low at approximately 7-7.5.    -Continue Merrem  -Due to follow final blood and urine cultures  -Appreciate infectious disease recommendations  -Add as needed Zofran for complaints of nausea  -Continue her other home medications at this time    Disposition:  Back to NH when stable/final cultures available and final ID recommendations

## 2018-02-14 NOTE — PLAN OF CARE
Problem: Infection, Risk/Actual (Adult)  Goal: Identify Related Risk Factors and Signs and Symptoms  Outcome: Ongoing (interventions implemented as appropriate)    Goal: Infection Prevention/Resolution  Outcome: Ongoing (interventions implemented as appropriate)      Problem: Skin Integrity Impairment, Risk/Actual (Adult)  Goal: Identify Related Risk Factors and Signs and Symptoms  Outcome: Ongoing (interventions implemented as appropriate)    Goal: Skin Integrity/Wound Healing  Outcome: Ongoing (interventions implemented as appropriate)      Problem: Pain, Chronic (Adult)  Goal: Identify Related Risk Factors and Signs and Symptoms  Outcome: Ongoing (interventions implemented as appropriate)    Goal: Acceptable Pain Control/Comfort Level  Outcome: Ongoing (interventions implemented as appropriate)      Problem: Patient Care Overview (Adult)  Goal: Plan of Care Review  Outcome: Ongoing (interventions implemented as appropriate)    Goal: Adult Individualization and Mutuality  Outcome: Ongoing (interventions implemented as appropriate)      Problem: Fall Risk (Adult)  Goal: Identify Related Risk Factors and Signs and Symptoms  Outcome: Ongoing (interventions implemented as appropriate)    Goal: Absence of Falls  Outcome: Ongoing (interventions implemented as appropriate)

## 2018-02-14 NOTE — PROGRESS NOTES
Discharge Planning Assessment  YANI Regalado     Patient Name: Lily Ace  MRN: [id number]  Today's Date: 2/14/2018    Admit Date: 2/12/2018          Discharge Plan       02/14/18 1502    Case Management/Social Work Plan    Plan Pt was admitted from [facility] and had a 14 day skilled bed hold. SS to follow.         Discharge Placement     Facility/Agency Request Status Selected? Address Phone Number Fax Number    [facility] Pending - No Request Sent     [address] [place] [id number] [phone]        Expected Discharge Date and Time     Expected Discharge Date Expected Discharge Time    Feb 16, 2018           Mary Jane Ragland

## 2018-02-14 NOTE — PLAN OF CARE
Problem: Infection, Risk/Actual (Adult)  Goal: Identify Related Risk Factors and Signs and Symptoms  Outcome: Ongoing (interventions implemented as appropriate)   02/13/18 0106   Infection, Risk/Actual   Infection, Risk/Actual: Related Risk Factors exposure to microbes;poor personal hygiene;skin integrity impairment;tissue perfusion altered;sleep disturbance   Signs and Symptoms (Infection, Risk/Actual) lab value changes;pain;cultures positive     Goal: Infection Prevention/Resolution  Outcome: Ongoing (interventions implemented as appropriate)   02/14/18 1228   Infection, Risk/Actual (Adult)   Infection Prevention/Resolution making progress toward outcome       Problem: Skin Integrity Impairment, Risk/Actual (Adult)  Goal: Identify Related Risk Factors and Signs and Symptoms  Outcome: Ongoing (interventions implemented as appropriate)   02/13/18 1639   Skin Integrity Impairment, Risk/Actual   Skin Integrity Impairment, Risk/Actual: Related Risk Factors immobility;environmental exposure;infection/disease process   Signs and Symptoms (Skin Integrity Impairment) inflammation     Goal: Skin Integrity/Wound Healing  Outcome: Ongoing (interventions implemented as appropriate)   02/14/18 1228   Skin Integrity Impairment, Risk/Actual (Adult)   Skin Integrity/Wound Healing making progress toward outcome       Problem: Pain, Chronic (Adult)  Goal: Identify Related Risk Factors and Signs and Symptoms  Outcome: Ongoing (interventions implemented as appropriate)   02/13/18 0106   Pain, Chronic   Related Risk Factors (Chronic Pain) nerve injury   Signs and Symptoms (Chronic Pain) BADLs/IADLs reluctance/inability to perform;fatigue/weakness     Goal: Acceptable Pain Control/Comfort Level  Outcome: Ongoing (interventions implemented as appropriate)   02/14/18 1228   Pain, Chronic (Adult)   Acceptable Pain Control/Comfort Level making progress toward outcome       Problem: Patient Care Overview (Adult)  Goal: Plan of Care  Review  Outcome: Ongoing (interventions implemented as appropriate)   02/14/18 1228   Patient Care Overview   Progress no change   Coping/Psychosocial Response Interventions   Plan Of Care Reviewed With patient     Goal: Adult Individualization and Mutuality  Outcome: Ongoing (interventions implemented as appropriate)    Goal: Discharge Needs Assessment  Outcome: Ongoing (interventions implemented as appropriate)      Problem: Fall Risk (Adult)  Goal: Identify Related Risk Factors and Signs and Symptoms  Outcome: Ongoing (interventions implemented as appropriate)   02/13/18 1639   Fall Risk   Fall Risk: Related Risk Factors confusion/agitation;gait/mobility problems;history of falls     Goal: Absence of Falls  Outcome: Ongoing (interventions implemented as appropriate)   02/14/18 1228   Fall Risk (Adult)   Absence of Falls making progress toward outcome

## 2018-02-14 NOTE — PROGRESS NOTES
"  I have personally seen and examined the patient today and discussed overnight interval progress and pertinent issues with nursing staff.    Subjective:    The patient feels significantly better today.  No dysuria, fever, chills or abdominal pain.  Blood cultures from 2/10/2018 finalized as non-ESBL Escherichia coli but urine culture from 2/10/2018 so far showing ESBL Escherichia coli and enterococcus.  Enterococcus is most likely colonization rather than true active infection. Non-ESBL Escherichia coli is most likely the culprit but urine most likely has several strains of Escherichia coli with predominant growth of ESBL in the urine culture.  Overall fairly complicated case and would not feel comfortable not covering ESBL.       History taken from: patient chart RN      Vital Signs    /64 (BP Location: Right arm, Patient Position: Lying)  Pulse 63  Temp 97.4 °F (36.3 °C) (Oral)   Resp 18  Ht 167.6 cm (66\")  Wt 59.6 kg (131 lb 6 oz)  SpO2 96%  BMI 21.2 kg/m2    Temp:  [97.4 °F (36.3 °C)-99 °F (37.2 °C)] 97.4 °F (36.3 °C)      Intake/Output Summary (Last 24 hours) at 02/14/18 0913  Last data filed at 02/14/18 0300   Gross per 24 hour   Intake              450 ml   Output                0 ml   Net              450 ml     Intake & Output (last 3 days)       02/11 0701 - 02/12 0700 02/12 0701 - 02/13 0700 02/13 0701 - 02/14 0700 02/14 0701 - 02/15 0700    P.O.  0 450     IV Piggyback  350      Total Intake(mL/kg)  350 (5.9) 450 (7.6)     Net   +350 +450              Unmeasured Urine Occurrence  2 x 9 x     Unmeasured Stool Occurrence  0 x 1 x           Physical Exam:       General Appearance:    Alert, cooperative, in no acute distress   Head:    Normocephalic, without obvious abnormality, atraumatic   Eyes:            Lids and lashes normal, conjunctivae and sclerae normal, no   icterus, no pallor, corneas clear, PERRLA   Ears:    Ears appear intact with no abnormalities noted   Throat:   No oral " lesions, no thrush, oral mucosa moist   Neck:   No adenopathy, supple, trachea midline, no thyromegaly, no   carotid bruit, no JVD   Back:     No tenderness to percussion or palpation, range of motion   normal   Lungs:     Clear to auscultation,respirations regular, even and unlabored. No wheezing, no ronchi and no crackles.    Heart:    Regular rhythm and normal rate, normal S1 and S2, no            murmur, no gallop, no rub, no click   Chest Wall:    No abnormalities observed   Abdomen:   Mild side pain    Rectal:     Deferred   Extremities:   Moves all extremities well, no edema, no cyanosis, no             redness   Pulses:   Pulses palpable and equal bilaterally   Skin:   No bleeding, bruising or rash   Lymph nodes:   No palpable adenopathy   Neurologic:   Awake, alert and oriented x 3. Following commands.         Results:      Results from last 7 days  Lab Units 02/14/18  0403 02/13/18  0804 02/12/18 2026 02/10/18  1737   WBC 10*3/mm3 5.39 4.14* 4.60 7.33     Lab Results   Component Value Date    NEUTROABS 2.46 02/14/2018         Results from last 7 days  Lab Units 02/14/18  0403   CREATININE mg/dL 1.19         Results from last 7 days  Lab Units 02/14/18  0403 02/13/18  0804   CRP mg/dL 1.40* 2.84*       Results Review:    I have personally reviewed laboratory data, culture results, radiology studies and antimicrobial therapy.    Hospital Medications (active)       Dose Frequency Start End    amLODIPine (NORVASC) tablet 10 mg 10 mg Daily 2/13/2018     Sig - Route: Take 1 tablet by mouth Daily. - Oral    atorvastatin (LIPITOR) tablet 10 mg 10 mg Daily 2/13/2018     Sig - Route: Take 1 tablet by mouth Daily. - Oral    busPIRone (BUSPAR) tablet 10 mg 10 mg Every 12 Hours Scheduled 2/13/2018     Sig - Route: Take 1 tablet by mouth Every 12 (Twelve) Hours. - Oral    carvedilol (COREG) tablet 25 mg 25 mg 2 Times Daily With Meals 2/13/2018     Sig - Route: Take 1 tablet by mouth 2 (Two) Times a Day With Meals. -  Oral    castor oil-balsam peru (VENELEX) ointment  2 Times Daily 2/13/2018     Sig - Route: Apply  topically 2 (Two) Times a Day. - Topical    docusate sodium (COLACE) capsule 200 mg 200 mg Daily 2/13/2018     Sig - Route: Take 2 capsules by mouth Daily. - Oral    ferrous sulfate tablet 325 mg 325 mg Daily 2/13/2018     Sig - Route: Take 1 tablet by mouth Daily. - Oral    gabapentin (NEURONTIN) capsule 200 mg 200 mg 2 Times Daily 2/13/2018     Sig - Route: Take 2 capsules by mouth 2 (Two) Times a Day. - Oral    heparin (porcine) 5000 UNIT/ML injection 5,000 Units 5,000 Units Every 12 Hours Scheduled 2/13/2018     Sig - Route: Inject 1 mL under the skin Every 12 (Twelve) Hours. - Subcutaneous    hydrOXYzine (ATARAX) tablet 25 mg 25 mg 2 Times Daily 2/13/2018     Sig - Route: Take 1 tablet by mouth 2 (Two) Times a Day. - Oral    insulin detemir (LEVEMIR) injection 15 Units 15 Units Nightly 2/13/2018     Sig - Route: Inject 15 Units under the skin Every Night. - Subcutaneous    lactobacillus acidophilus (RISAQUAD) capsule 1 capsule 1 capsule Daily 2/13/2018     Sig - Route: Take 1 capsule by mouth Daily. - Oral    lactulose (CHRONULAC) 10 GM/15ML solution 10 g 10 g 2 Times Daily 2/13/2018     Sig - Route: Take 15 mL by mouth 2 (Two) Times a Day. - Oral    levothyroxine (SYNTHROID, LEVOTHROID) tablet 50 mcg 50 mcg Every Early Morning 2/13/2018     Sig - Route: Take 1 tablet by mouth Every Morning. - Oral    megestrol (MEGACE) 40 MG/ML suspension 200 mg 200 mg 2 Times Daily 2/13/2018     Sig - Route: Take 5 mL by mouth 2 (Two) Times a Day. - Oral    meropenem (MERREM) 1 g/100 mL 0.9% NS VTB (mbp) 1 g Every 12 Hours 2/14/2018 2/20/2018    Sig - Route: Infuse 100 mL into a venous catheter Every 12 (Twelve) Hours. - Intravenous    multivitamin (DAILY DARRELL) tablet 1 tablet 1 tablet Daily 2/13/2018     Sig - Route: Take 1 tablet by mouth Daily. - Oral    nitroglycerin (NITROSTAT) SL tablet 0.4 mg 0.4 mg Every 5 Minutes PRN  2/12/2018     Sig - Route: Place 1 tablet under the tongue Every 5 (Five) Minutes As Needed for Chest Pain (if systolic BP greater than 100 mm/Hg.). - Sublingual    ondansetron (ZOFRAN) tablet 4 mg 4 mg Every 6 Hours PRN 2/13/2018     Sig - Route: Take 1 tablet by mouth Every 6 (Six) Hours As Needed for Nausea or Vomiting. - Oral    OXcarbazepine (TRILEPTAL) tablet 150 mg 150 mg Every 12 Hours Scheduled 2/13/2018     Sig - Route: Take 0.5 tablets by mouth Every 12 (Twelve) Hours. - Oral    oxyCODONE (ROXICODONE) immediate release tablet 5 mg 5 mg Every 4 Hours PRN 2/13/2018     Sig - Route: Take 1 tablet by mouth Every 4 (Four) Hours As Needed for Moderate Pain . - Oral    pantoprazole (PROTONIX) EC tablet 40 mg 40 mg 2 Times Daily 2/13/2018     Sig - Route: Take 1 tablet by mouth 2 (Two) Times a Day. - Oral    sodium chloride 0.9 % flush 1-10 mL 1-10 mL As Needed 2/12/2018     Sig - Route: Infuse 1-10 mL into a venous catheter As Needed for Line Care. - Intravenous    meropenem (MERREM) 500mg/100 mL 0.9% NS IVPB (mbp) (Discontinued) 500 mg Every 8 Hours 2/13/2018 2/14/2018    Sig - Route: Infuse 100 mL into a venous catheter Every 8 (Eight) Hours. - Intravenous            Cultures:    Blood Culture   Date Value Ref Range Status   02/12/2018 No growth at 24 hours  Preliminary   02/12/2018 No growth at 24 hours  Preliminary   02/10/2018 Escherichia coli (A)  Final   02/10/2018 Escherichia coli (A)  Final     Urine Culture   Date Value Ref Range Status   02/12/2018 (A)  Preliminary    >100,000 CFU/mL Gram Negative Bacilli, Morphology consistent with Escherichia / Citrobacter   02/10/2018 >100,000 CFU/mL Escherichia coli ESBL (C)  Preliminary     Comment:       Consider infectious disease consult.  Susceptibility results may not correlate to clinical outcomes.   02/10/2018 (A)  Preliminary    50,000-60,000 CFU/mL Gram Positive Cocci, Morphology consistent with Group D Enterococcus           Assessment/Plan      ASSESSMENT:    1. Bacteremia  2.  Acute pyelonephritis     PLAN:    The patient feels significantly better today.  No dysuria, fever, chills or abdominal pain.  Blood cultures from 2/10/2018 finalized as non-ESBL Escherichia coli but urine culture from 2/10/2018 so far showing ESBL Escherichia coli and enterococcus.  Enterococcus is most likely colonization rather than true active infection. Non-ESBL Escherichia coli is most likely the culprit but urine most likely has several strains of Escherichia coli with predominant growth of ESBL in the urine culture.  Overall fairly complicated case and would not feel comfortable not covering ESBL.  For now recommend to continue with meropenem or de-escalate to ertapenem for easier outpatient therapy and would recommend to continue course of meropenem through 2/20/2018.     Patient presented to [facility] Emergency Department on 2/12/2018 for admission due to positive blood cultures obtained on 2/10/18.  Her visit to the ER on 2/10/18 was concerning for UTI as well as patient received Rocephin and sent back to the nursing home where she followed up at the nursing home with Zosyn.     We will continue to follow closely.    Patient's findings and recommendations were discussed with patient and nursing staff    Code Status: Conditional Code    Dorina Henley PA-C  02/14/18  9:13 AM      Physician Attestation:    The documentation recorded by the scribe accurately reflects the service I personally performed and the decisions made by me.    Leah Cedillo MD  Infectious Diseases  02/14/18  11:23 AM

## 2018-02-15 LAB
ALBUMIN SERPL-MCNC: 2.7 G/DL (ref 3.4–4.8)
ALBUMIN/GLOB SERPL: 1 G/DL (ref 1.5–2.5)
ALP SERPL-CCNC: 67 U/L (ref 35–104)
ALT SERPL W P-5'-P-CCNC: 18 U/L (ref 10–36)
ANION GAP SERPL CALCULATED.3IONS-SCNC: 9.6 MMOL/L (ref 3.6–11.2)
AST SERPL-CCNC: 32 U/L (ref 10–30)
BACTERIA SPEC AEROBE CULT: ABNORMAL
BACTERIA SPEC AEROBE CULT: ABNORMAL
BILIRUB SERPL-MCNC: 0.1 MG/DL (ref 0.2–1.8)
BUN BLD-MCNC: 27 MG/DL (ref 7–21)
BUN/CREAT SERPL: 21.6 (ref 7–25)
CALCIUM SPEC-SCNC: 8.2 MG/DL (ref 7.7–10)
CHLORIDE SERPL-SCNC: 114 MMOL/L (ref 99–112)
CO2 SERPL-SCNC: 16.4 MMOL/L (ref 24.3–31.9)
CREAT BLD-MCNC: 1.25 MG/DL (ref 0.43–1.29)
DEPRECATED RDW RBC AUTO: 46.3 FL (ref 37–54)
EOSINOPHIL # BLD MANUAL: 0.49 10*3/MM3 (ref 0–0.7)
EOSINOPHIL NFR BLD MANUAL: 10 % (ref 0–5)
ERYTHROCYTE [DISTWIDTH] IN BLOOD BY AUTOMATED COUNT: 14.3 % (ref 11.5–14.5)
GFR SERPL CREATININE-BSD FRML MDRD: 43 ML/MIN/1.73
GLOBULIN UR ELPH-MCNC: 2.8 GM/DL
GLUCOSE BLD-MCNC: 208 MG/DL (ref 70–110)
GLUCOSE BLDC GLUCOMTR-MCNC: 161 MG/DL (ref 70–130)
GLUCOSE BLDC GLUCOMTR-MCNC: 242 MG/DL (ref 70–130)
HCT VFR BLD AUTO: 23.3 % (ref 37–47)
HGB BLD-MCNC: 7.5 G/DL (ref 12–16)
HYPOCHROMIA BLD QL: ABNORMAL
LYMPHOCYTES # BLD MANUAL: 1.12 10*3/MM3 (ref 1–3)
LYMPHOCYTES NFR BLD MANUAL: 23 % (ref 21–51)
LYMPHOCYTES NFR BLD MANUAL: 6 % (ref 0–10)
MACROCYTES BLD QL SMEAR: ABNORMAL
MCH RBC QN AUTO: 30.4 PG (ref 27–33)
MCHC RBC AUTO-ENTMCNC: 32.2 G/DL (ref 33–37)
MCV RBC AUTO: 94.3 FL (ref 80–94)
METAMYELOCYTES NFR BLD MANUAL: 1 % (ref 0–0)
MONOCYTES # BLD AUTO: 0.29 10*3/MM3 (ref 0.1–0.9)
NEUTROPHILS # BLD AUTO: 2.92 10*3/MM3 (ref 1.4–6.5)
NEUTROPHILS NFR BLD MANUAL: 57 % (ref 30–70)
NEUTS BAND NFR BLD MANUAL: 3 % (ref 4–12)
OSMOLALITY SERPL CALC.SUM OF ELEC: 290.6 MOSM/KG (ref 273–305)
PLAT MORPH BLD: NORMAL
PLATELET # BLD AUTO: 150 10*3/MM3 (ref 130–400)
PMV BLD AUTO: 9.8 FL (ref 6–10)
POTASSIUM BLD-SCNC: 4.2 MMOL/L (ref 3.5–5.3)
PROT SERPL-MCNC: 5.5 G/DL (ref 6–8)
RBC # BLD AUTO: 2.47 10*6/MM3 (ref 4.2–5.4)
SCAN SLIDE: NORMAL
SODIUM BLD-SCNC: 140 MMOL/L (ref 135–153)
WBC NRBC COR # BLD: 4.86 10*3/MM3 (ref 4.5–12.5)

## 2018-02-15 PROCEDURE — 85007 BL SMEAR W/DIFF WBC COUNT: CPT | Performed by: INTERNAL MEDICINE

## 2018-02-15 PROCEDURE — 85025 COMPLETE CBC W/AUTO DIFF WBC: CPT | Performed by: INTERNAL MEDICINE

## 2018-02-15 PROCEDURE — 25010000002 MEROPENEM

## 2018-02-15 PROCEDURE — 94799 UNLISTED PULMONARY SVC/PX: CPT

## 2018-02-15 PROCEDURE — 25010000002 HEPARIN (PORCINE) PER 1000 UNITS: Performed by: INTERNAL MEDICINE

## 2018-02-15 PROCEDURE — 63710000001 INSULIN DETEMIR PER 5 UNITS: Performed by: INTERNAL MEDICINE

## 2018-02-15 PROCEDURE — 82962 GLUCOSE BLOOD TEST: CPT

## 2018-02-15 PROCEDURE — 80053 COMPREHEN METABOLIC PANEL: CPT | Performed by: INTERNAL MEDICINE

## 2018-02-15 RX ORDER — LACTULOSE 10 G/15ML
20 SOLUTION ORAL 2 TIMES DAILY
Status: DISCONTINUED | OUTPATIENT
Start: 2018-02-15 | End: 2018-02-16 | Stop reason: HOSPADM

## 2018-02-15 RX ADMIN — ATORVASTATIN CALCIUM 10 MG: 10 TABLET, FILM COATED ORAL at 09:54

## 2018-02-15 RX ADMIN — HEPARIN SODIUM 5000 UNITS: 5000 INJECTION, SOLUTION INTRAVENOUS; SUBCUTANEOUS at 09:52

## 2018-02-15 RX ADMIN — CARVEDILOL 25 MG: 25 TABLET, FILM COATED ORAL at 19:56

## 2018-02-15 RX ADMIN — LEVOTHYROXINE SODIUM 50 MCG: 50 TABLET ORAL at 04:10

## 2018-02-15 RX ADMIN — MEROPENEM 1 G: 1 INJECTION, POWDER, FOR SOLUTION INTRAVENOUS at 12:11

## 2018-02-15 RX ADMIN — OXCARBAZEPINE 150 MG: 300 TABLET, FILM COATED ORAL at 19:54

## 2018-02-15 RX ADMIN — CASTOR OIL AND BALSAM, PERU: 788; 87 OINTMENT TOPICAL at 13:11

## 2018-02-15 RX ADMIN — DOCUSATE SODIUM 200 MG: 100 CAPSULE, LIQUID FILLED ORAL at 09:54

## 2018-02-15 RX ADMIN — Medication 1 CAPSULE: at 09:53

## 2018-02-15 RX ADMIN — HYDROXYZINE 25 MG: 25 TABLET, FILM COATED ORAL at 19:54

## 2018-02-15 RX ADMIN — BUSPIRONE HYDROCHLORIDE 10 MG: 10 TABLET ORAL at 19:53

## 2018-02-15 RX ADMIN — MEGESTROL ACETATE 200 MG: 40 SUSPENSION ORAL at 19:53

## 2018-02-15 RX ADMIN — MEGESTROL ACETATE 200 MG: 40 SUSPENSION ORAL at 09:55

## 2018-02-15 RX ADMIN — LACTULOSE 10 G: 20 SOLUTION ORAL at 09:55

## 2018-02-15 RX ADMIN — CASTOR OIL AND BALSAM, PERU: 788; 87 OINTMENT TOPICAL at 19:57

## 2018-02-15 RX ADMIN — Medication 1 TABLET: at 09:53

## 2018-02-15 RX ADMIN — FERROUS SULFATE TAB 325 MG (65 MG ELEMENTAL FE) 325 MG: 325 (65 FE) TAB at 09:52

## 2018-02-15 RX ADMIN — HEPARIN SODIUM 5000 UNITS: 5000 INJECTION, SOLUTION INTRAVENOUS; SUBCUTANEOUS at 19:55

## 2018-02-15 RX ADMIN — POLYETHYLENE GLYCOL (3350) 17 G: 17 POWDER, FOR SOLUTION ORAL at 18:28

## 2018-02-15 RX ADMIN — INSULIN DETEMIR 15 UNITS: 100 INJECTION, SOLUTION SUBCUTANEOUS at 20:03

## 2018-02-15 RX ADMIN — CARVEDILOL 25 MG: 25 TABLET, FILM COATED ORAL at 08:53

## 2018-02-15 RX ADMIN — AMLODIPINE BESYLATE 10 MG: 10 TABLET ORAL at 09:55

## 2018-02-15 RX ADMIN — PANTOPRAZOLE SODIUM 40 MG: 40 TABLET, DELAYED RELEASE ORAL at 19:54

## 2018-02-15 RX ADMIN — GABAPENTIN 200 MG: 100 CAPSULE ORAL at 19:54

## 2018-02-15 RX ADMIN — GABAPENTIN 200 MG: 100 CAPSULE ORAL at 13:06

## 2018-02-15 RX ADMIN — OXCARBAZEPINE 150 MG: 300 TABLET, FILM COATED ORAL at 09:54

## 2018-02-15 RX ADMIN — BUSPIRONE HYDROCHLORIDE 10 MG: 10 TABLET ORAL at 09:55

## 2018-02-15 RX ADMIN — PANTOPRAZOLE SODIUM 40 MG: 40 TABLET, DELAYED RELEASE ORAL at 09:52

## 2018-02-15 RX ADMIN — HYDROXYZINE 25 MG: 25 TABLET, FILM COATED ORAL at 09:54

## 2018-02-15 NOTE — PROGRESS NOTES
"  I have personally seen and examined the patient today and discussed overnight interval progress and pertinent issues with nursing staff.    Subjective:    Patient was sitting up on side of the bed morning.  Significantly improved CRP with normal white count.  Denies fever, chills, nausea, vomiting or diarrhea.      Blood cultures from 2/10/2018 finalized as non-ESBL Escherichia coli but urine culture from 2/10/2018 so far showing ESBL Escherichia coli and enterococcus.  Enterococcus is most likely colonization rather than true active infection. Non-ESBL Escherichia coli is most likely the culprit but urine most likely has several strains of Escherichia coli with predominant growth of ESBL in the urine culture.  Overall fairly complicated case and would not feel comfortable not covering ESBL.       History taken from: patient chart RN      Vital Signs    /61 (BP Location: Right arm, Patient Position: Lying)  Pulse 60  Temp 98.6 °F (37 °C) (Oral)   Resp 18  Ht 167.6 cm (66\")  Wt 59.6 kg (131 lb 6 oz)  SpO2 97%  BMI 21.2 kg/m2    Temp:  [97.5 °F (36.4 °C)-98.6 °F (37 °C)] 98.6 °F (37 °C)      Intake/Output Summary (Last 24 hours) at 02/15/18 1020  Last data filed at 02/15/18 0815   Gross per 24 hour   Intake              690 ml   Output                0 ml   Net              690 ml     Intake & Output (last 3 days)       02/12 0701 - 02/13 0700 02/13 0701 - 02/14 0700 02/14 0701 - 02/15 0700 02/15 0701 - 02/16 0700    P.O. 0 450 660 240    IV Piggyback 350       Total Intake(mL/kg) 350 (5.9) 450 (7.6) 660 (11.1) 240 (4)    Net +350 +450 +660 +240            Unmeasured Urine Occurrence 2 x 9 x 9 x     Unmeasured Stool Occurrence 0 x 1 x 3 x           Physical Exam:       General Appearance:    Alert, cooperative, in no acute distress   Head:    Normocephalic, without obvious abnormality, atraumatic   Eyes:            Lids and lashes normal, conjunctivae and sclerae normal, no   icterus, no pallor, corneas " clear, PERRLA   Ears:    Ears appear intact with no abnormalities noted   Throat:   No oral lesions, no thrush, oral mucosa moist   Neck:   No adenopathy, supple, trachea midline, no thyromegaly, no   carotid bruit, no JVD   Back:     No tenderness to percussion or palpation, range of motion   normal   Lungs:     Clear to auscultation,respirations regular, even and unlabored. No wheezing, no ronchi and no crackles.    Heart:    Regular rhythm and normal rate, normal S1 and S2, no            murmur, no gallop, no rub, no click   Chest Wall:    No abnormalities observed   Abdomen:   Mild side pain    Rectal:     Deferred   Extremities:   Moves all extremities well, no edema, no cyanosis, no             redness   Pulses:   Pulses palpable and equal bilaterally   Skin:   No bleeding, bruising or rash   Lymph nodes:   No palpable adenopathy   Neurologic:   Awake, alert and oriented x 3. Following commands.         Results:      Results from last 7 days  Lab Units 02/15/18  0103 02/14/18  0403 02/13/18  0804 02/12/18  2026 02/10/18  1737   WBC 10*3/mm3 4.86 5.39 4.14* 4.60 7.33     Lab Results   Component Value Date    NEUTROABS 2.92 02/15/2018         Results from last 7 days  Lab Units 02/15/18  0103   CREATININE mg/dL 1.25         Results from last 7 days  Lab Units 02/14/18  0403 02/13/18  0804   CRP mg/dL 1.40* 2.84*       Results Review:    I have personally reviewed laboratory data, culture results, radiology studies and antimicrobial therapy.    Hospital Medications (active)       Dose Frequency Start End    amLODIPine (NORVASC) tablet 10 mg 10 mg Daily 2/13/2018     Sig - Route: Take 1 tablet by mouth Daily. - Oral    atorvastatin (LIPITOR) tablet 10 mg 10 mg Daily 2/13/2018     Sig - Route: Take 1 tablet by mouth Daily. - Oral    busPIRone (BUSPAR) tablet 10 mg 10 mg Every 12 Hours Scheduled 2/13/2018     Sig - Route: Take 1 tablet by mouth Every 12 (Twelve) Hours. - Oral    carvedilol (COREG) tablet 25 mg 25 mg  2 Times Daily With Meals 2/13/2018     Sig - Route: Take 1 tablet by mouth 2 (Two) Times a Day With Meals. - Oral    castor oil-balsam peru (VENELEX) ointment  2 Times Daily 2/13/2018     Sig - Route: Apply  topically 2 (Two) Times a Day. - Topical    docusate sodium (COLACE) capsule 200 mg 200 mg Daily 2/13/2018     Sig - Route: Take 2 capsules by mouth Daily. - Oral    ferrous sulfate tablet 325 mg 325 mg Daily 2/13/2018     Sig - Route: Take 1 tablet by mouth Daily. - Oral    gabapentin (NEURONTIN) capsule 200 mg 200 mg 2 Times Daily 2/13/2018     Sig - Route: Take 2 capsules by mouth 2 (Two) Times a Day. - Oral    heparin (porcine) 5000 UNIT/ML injection 5,000 Units 5,000 Units Every 12 Hours Scheduled 2/13/2018     Sig - Route: Inject 1 mL under the skin Every 12 (Twelve) Hours. - Subcutaneous    hydrOXYzine (ATARAX) tablet 25 mg 25 mg 2 Times Daily 2/13/2018     Sig - Route: Take 1 tablet by mouth 2 (Two) Times a Day. - Oral    insulin detemir (LEVEMIR) injection 15 Units 15 Units Nightly 2/13/2018     Sig - Route: Inject 15 Units under the skin Every Night. - Subcutaneous    lactobacillus acidophilus (RISAQUAD) capsule 1 capsule 1 capsule Daily 2/13/2018     Sig - Route: Take 1 capsule by mouth Daily. - Oral    lactulose (CHRONULAC) 10 GM/15ML solution 10 g 10 g 2 Times Daily 2/13/2018     Sig - Route: Take 15 mL by mouth 2 (Two) Times a Day. - Oral    levothyroxine (SYNTHROID, LEVOTHROID) tablet 50 mcg 50 mcg Every Early Morning 2/13/2018     Sig - Route: Take 1 tablet by mouth Every Morning. - Oral    megestrol (MEGACE) 40 MG/ML suspension 200 mg 200 mg 2 Times Daily 2/13/2018     Sig - Route: Take 5 mL by mouth 2 (Two) Times a Day. - Oral    meropenem (MERREM) 1 g/100 mL 0.9% NS VTB (mbp) 1 g Every 12 Hours 2/14/2018 2/20/2018    Sig - Route: Infuse 100 mL into a venous catheter Every 12 (Twelve) Hours. - Intravenous    multivitamin (DAILY DARRELL) tablet 1 tablet 1 tablet Daily 2/13/2018     Sig - Route:  Take 1 tablet by mouth Daily. - Oral    nitroglycerin (NITROSTAT) SL tablet 0.4 mg 0.4 mg Every 5 Minutes PRN 2/12/2018     Sig - Route: Place 1 tablet under the tongue Every 5 (Five) Minutes As Needed for Chest Pain (if systolic BP greater than 100 mm/Hg.). - Sublingual    ondansetron (ZOFRAN) tablet 4 mg 4 mg Every 6 Hours PRN 2/13/2018     Sig - Route: Take 1 tablet by mouth Every 6 (Six) Hours As Needed for Nausea or Vomiting. - Oral    OXcarbazepine (TRILEPTAL) tablet 150 mg 150 mg Every 12 Hours Scheduled 2/13/2018     Sig - Route: Take 0.5 tablets by mouth Every 12 (Twelve) Hours. - Oral    oxyCODONE (ROXICODONE) immediate release tablet 5 mg 5 mg Every 4 Hours PRN 2/13/2018     Sig - Route: Take 1 tablet by mouth Every 4 (Four) Hours As Needed for Moderate Pain . - Oral    pantoprazole (PROTONIX) EC tablet 40 mg 40 mg 2 Times Daily 2/13/2018     Sig - Route: Take 1 tablet by mouth 2 (Two) Times a Day. - Oral    sodium chloride 0.9 % flush 1-10 mL 1-10 mL As Needed 2/12/2018     Sig - Route: Infuse 1-10 mL into a venous catheter As Needed for Line Care. - Intravenous    meropenem (MERREM) 500mg/100 mL 0.9% NS IVPB (mbp) (Discontinued) 500 mg Every 8 Hours 2/13/2018 2/14/2018    Sig - Route: Infuse 100 mL into a venous catheter Every 8 (Eight) Hours. - Intravenous            Cultures:    Blood Culture   Date Value Ref Range Status   02/12/2018 No growth at 24 hours  Preliminary   02/12/2018 No growth at 24 hours  Preliminary   02/10/2018 Escherichia coli (A)  Final   02/10/2018 Escherichia coli (A)  Final     Urine Culture   Date Value Ref Range Status   02/12/2018 (A)  Preliminary    >100,000 CFU/mL Gram Negative Bacilli, Morphology consistent with Escherichia / Citrobacter   02/10/2018 >100,000 CFU/mL Escherichia coli ESBL (C)  Preliminary     Comment:       Consider infectious disease consult.  Susceptibility results may not correlate to clinical outcomes.   02/10/2018 (A)  Preliminary    50,000-60,000  CFU/mL Gram Positive Cocci, Morphology consistent with Group D Enterococcus           Assessment/Plan     ASSESSMENT:    1. Bacteremia  2.  Acute pyelonephritis     PLAN:    Patient was sitting up on side of the bed morning.  Significantly improved CRP with normal white count.  Denies fever, chills, nausea, vomiting or diarrhea.      Blood cultures from 2/10/2018 finalized as non-ESBL Escherichia coli but urine culture from 2/10/2018 so far showing ESBL Escherichia coli and enterococcus.  Enterococcus is most likely colonization rather than true active infection. Non-ESBL Escherichia coli is most likely the culprit but urine most likely has several strains of Escherichia coli with predominant growth of ESBL in the urine culture.  Overall fairly complicated case and would not feel comfortable not covering ESBL.      For now recommend to continue with meropenem or de-escalate to ertapenem for easier outpatient therapy and would recommend to continue course of meropenem through 2/20/2018.     Patient presented to [facility] Emergency Department on 2/12/2018 for admission due to positive blood cultures obtained on 2/10/18.  Her visit to the ER on 2/10/18 was concerning for UTI as well as patient received Rocephin and sent back to the nursing home where she followed up at the nursing home with Zosyn.     We will continue to follow closely.    Patient's findings and recommendations were discussed with patient and nursing staff    Code Status: Conditional Code    ANICETO Pool  02/15/18  10:20 AM

## 2018-02-15 NOTE — PLAN OF CARE
Problem: Infection, Risk/Actual (Adult)  Goal: Identify Related Risk Factors and Signs and Symptoms  Outcome: Ongoing (interventions implemented as appropriate)    Goal: Infection Prevention/Resolution  Outcome: Ongoing (interventions implemented as appropriate)      Problem: Skin Integrity Impairment, Risk/Actual (Adult)  Goal: Identify Related Risk Factors and Signs and Symptoms  Outcome: Ongoing (interventions implemented as appropriate)    Goal: Skin Integrity/Wound Healing  Outcome: Ongoing (interventions implemented as appropriate)      Problem: Pain, Chronic (Adult)  Goal: Identify Related Risk Factors and Signs and Symptoms  Outcome: Ongoing (interventions implemented as appropriate)    Goal: Acceptable Pain Control/Comfort Level  Outcome: Ongoing (interventions implemented as appropriate)      Problem: Patient Care Overview (Adult)  Goal: Plan of Care Review  Outcome: Ongoing (interventions implemented as appropriate)    Goal: Adult Individualization and Mutuality  Outcome: Ongoing (interventions implemented as appropriate)    Goal: Discharge Needs Assessment  Outcome: Ongoing (interventions implemented as appropriate)      Problem: Fall Risk (Adult)  Goal: Identify Related Risk Factors and Signs and Symptoms  Outcome: Ongoing (interventions implemented as appropriate)    Goal: Absence of Falls  Outcome: Ongoing (interventions implemented as appropriate)

## 2018-02-15 NOTE — PROGRESS NOTES
Discharge Planning Assessment   Fabricio     Patient Name: Lily Ace  MRN: [id number]  Today's Date: 2/15/2018    Admit Date: 2/12/2018       Discharge Plan       02/15/18 1554    Case Management/Social Work Plan    Plan SS spoke to Dr. Marmolejo who states plans to discharge pt back to nursing home on Friday, 2-16-18 and pt will need four more days of IV Merrem 1 g twice per day, however IV antibiotics can be changed to IV Invanz once per day if needed. [facility] will [facility]. SS to follow.     16:02 [facility] Robyn who states pt can return with IV Merrem or IV Invanz. SS made Dr. Marmolejo aware. SS to follow.         Discharge Placement     Facility/Agency Request Status Selected? Address Phone Number Fax Number    [facility] Pending - No Request Sent     [address] [place] [address] [phone]        Expected Discharge Date and Time     Expected Discharge Date Expected Discharge Time    Feb 16, 2018           Mary Jane Ragland

## 2018-02-15 NOTE — PROGRESS NOTES
"HOSPITALIST PROGRESS NOTE    Patient Identification:  Name:  Lily Ace  Age:  62 y.o.  Sex:  female  :  1955  MRN:  [id number]  Visit Number:  [id number]  Primary Care Provider:  Sandra Aponte MD    Length of stay:  2     HPI: 63 yo female admitted with positive blood cultures    Subjective:  The patient was seen this evening.  She is resting comfortably in bed but continues to complain that her \"stomach feel sick.\"  When asked to further describe, the patient states that it is not related to food.  She denies any chest pain.  She reports lower quadrant abdominal pain and denies any constipation.  She denies any vomiting.    Present during exam: N/A    Current Hospital Meds:    amLODIPine 10 mg Oral Daily   atorvastatin 10 mg Oral Daily   busPIRone 10 mg Oral Q12H   carvedilol 25 mg Oral BID With Meals   castor oil-balsam peru  Topical BID   docusate sodium 200 mg Oral Daily   ferrous sulfate 325 mg Oral Daily   gabapentin 200 mg Oral BID   heparin (porcine) 5,000 Units Subcutaneous Q12H   hydrOXYzine 25 mg Oral BID   insulin detemir 15 Units Subcutaneous Nightly   lactobacillus acidophilus 1 capsule Oral Daily   lactulose 10 g Oral BID   levothyroxine 50 mcg Oral Q AM   megestrol 200 mg Oral BID   meropenem 1 g Intravenous Q12H   multivitamin 1 tablet Oral Daily   OXcarbazepine 150 mg Oral Q12H   pantoprazole 40 mg Oral BID     Vital Signs  Temp:  [97.4 °F (36.3 °C)-99 °F (37.2 °C)] 98 °F (36.7 °C)  Heart Rate:  [55-65] 57  Resp:  [18-22] 22  BP: (112-164)/(50-74) 160/64  Last 3 weights    18  1901 18  2339   Weight: 59 kg (130 lb) 59.6 kg (131 lb 6 oz)     Body mass index is 21.2 kg/(m^2).    Physical exam:  Physical Exam   Constitutional: She appears well-developed and well-nourished. No distress.   HENT:   Head: Normocephalic and atraumatic.   Nose: Nose normal.   Mouth/Throat: Oropharynx is clear and moist and mucous membranes are normal.   Eyes: Conjunctivae and EOM are normal. " Pupils are equal, round, and reactive to light. No scleral icterus.   Neck: Trachea normal. Neck supple. No JVD present. Carotid bruit is not present. No thyromegaly present.   Cardiovascular: Normal rate, regular rhythm and normal heart sounds.  Exam reveals no gallop and no friction rub.    No murmur heard.  Pulmonary/Chest: Effort normal. No respiratory distress. She has no wheezes. She has no rales.   Abdominal: Soft. Bowel sounds are normal. She exhibits no distension. There is tenderness in the right lower quadrant and left lower quadrant. There is no guarding.   Neurological: She is alert. No cranial nerve deficit.   Oriented to self and place; follows commands   Skin: Skin is warm, dry and intact. No rash noted. No erythema.   A few scattered bruises noted on upper extremities   Psychiatric: She has a normal mood and affect. Her speech is normal.     Results Review:    Results from last 7 days  Lab Units 02/14/18  0403 02/13/18  0804 02/12/18  2026 02/10/18  1737   WBC 10*3/mm3 5.39 4.14* 4.60 7.33   HEMOGLOBIN g/dL 7.2* 7.1* 7.3* 7.2*   HEMATOCRIT % 23.1* 22.2* 23.0* 22.0*   PLATELETS 10*3/mm3 133 105* 101* 61*       Results from last 7 days  Lab Units 02/14/18 0403 02/13/18  0804 02/12/18  2026 02/10/18  1737   SODIUM mmol/L 139 137 136 130*   POTASSIUM mmol/L 4.3 4.6 5.0 4.8   CHLORIDE mmol/L 116* 113* 112 104   CO2 mmol/L 16.9* 15.6* 16.2* 18.5*   BUN mg/dL 31* 45* 55* 64*   CREATININE mg/dL 1.19 1.48* 1.73* 1.93*   CALCIUM mg/dL 8.1 8.1 8.3 8.4   GLUCOSE mg/dL 85 171* 182* 144*       Results from last 7 days  Lab Units 02/14/18 0403 02/13/18  0804 02/12/18  2026 02/10/18  1737   BILIRUBIN mg/dL 0.1* 0.2 0.1* 0.3   ALK PHOS U/L 67 75 90 72   AST (SGOT) U/L 24 28 34* 25   ALT (SGPT) U/L 18 22 25 22           Results from last 7 days  Lab Units 02/12/18 2026   INR  0.99       Results from last 7 days  Lab Units 02/13/18  0804 02/12/18 2026   CK TOTAL U/L 13*  --    TROPONIN I ng/mL <0.006 <0.006   CK  MB INDEX % 7.7*  --    MYOGLOBIN ng/mL 39.0  --        Results from last 7 days  Lab Units 02/12/18 2026   BNP pg/mL 287.0*     Blood Culture   Date Value Ref Range Status   02/12/2018 No growth at 24 hours  Preliminary   02/12/2018 No growth at 24 hours  Preliminary   02/10/2018 Escherichia coli (A)  Final   02/10/2018 Escherichia coli (A)  Final     Urine Culture   Date Value Ref Range Status   02/12/2018 (A)  Preliminary    >100,000 CFU/mL Gram Negative Bacilli, Morphology consistent with Escherichia / Citrobacter   02/10/2018 >100,000 CFU/mL Escherichia coli ESBL (C)  Final     Comment:       Consider infectious disease consult.  Susceptibility results may not correlate to clinical outcomes.   02/10/2018 50,000-60,000 CFU/mL Enterococcus faecium, VRE (C)  Final     Comment:       Vancomycin Resistant Enterococcus species. Patient may be an isolation risk.         Assessment/Plan     -E coli bacteremia  -History of ESBL Escherichia coli UTI; urine culture from February 10 revealing possible VRE that may be a contaminant but also revealing ESBL Escherichia coli  -Nausea and abdominal pain  -Acute on chronic renal failure that has improved  -Acute on chronic normocytic anemia  -Cirrhosis secondary to hepatitis C  -Functional quadriplegia secondary to a C5 fracture  -Insulin-dependent diabetes mellitus type 2 that is controlled  -Thrombocytopenia that has resolved    Continue with Merrem through February 20 as per infectious disease recommendations.  I will obtain a KUB to further evaluate the patient's complaints of abdominal pain and nausea.  Continue to follow the patient's final urine cultures and blood cultures.  Her repeat blood cultures revealed no growth to date.    The patient's CRP is minimally elevated.  I will repeat her CBC and CMP in the morning.  Continue with her current insulin regimen as the patient's blood glucose levels are acceptable at this time.     The patient is high risk due to the  following diagnoses/reasons:  Escherichia coli bacteremia with UTI and functional quadriplegia    I discussed the patients findings and my recommendations with patient and nursing staff (WAN Long)    Disposition  Back to the nursing home when stable.      Cami Marmolejo DO  02/14/18  8:20 PM

## 2018-02-16 VITALS
HEART RATE: 63 BPM | RESPIRATION RATE: 20 BRPM | DIASTOLIC BLOOD PRESSURE: 74 MMHG | SYSTOLIC BLOOD PRESSURE: 137 MMHG | BODY MASS INDEX: 21.11 KG/M2 | WEIGHT: 131.38 LBS | OXYGEN SATURATION: 96 % | TEMPERATURE: 98.1 F | HEIGHT: 66 IN

## 2018-02-16 PROBLEM — R78.81 BACTEREMIA: Status: RESOLVED | Noted: 2018-02-12 | Resolved: 2018-02-16

## 2018-02-16 LAB
ANION GAP SERPL CALCULATED.3IONS-SCNC: 7.6 MMOL/L (ref 3.6–11.2)
BASOPHILS # BLD AUTO: 0.02 10*3/MM3 (ref 0–0.3)
BASOPHILS NFR BLD AUTO: 0.3 % (ref 0–2)
BUN BLD-MCNC: 22 MG/DL (ref 7–21)
BUN/CREAT SERPL: 23.2 (ref 7–25)
CALCIUM SPEC-SCNC: 8.3 MG/DL (ref 7.7–10)
CHLORIDE SERPL-SCNC: 115 MMOL/L (ref 99–112)
CO2 SERPL-SCNC: 17.4 MMOL/L (ref 24.3–31.9)
CREAT BLD-MCNC: 0.95 MG/DL (ref 0.43–1.29)
DEPRECATED RDW RBC AUTO: 45.4 FL (ref 37–54)
EOSINOPHIL # BLD AUTO: 0.61 10*3/MM3 (ref 0–0.7)
EOSINOPHIL NFR BLD AUTO: 10.6 % (ref 0–5)
ERYTHROCYTE [DISTWIDTH] IN BLOOD BY AUTOMATED COUNT: 14.1 % (ref 11.5–14.5)
GFR SERPL CREATININE-BSD FRML MDRD: 59 ML/MIN/1.73
GLUCOSE BLD-MCNC: 111 MG/DL (ref 70–110)
GLUCOSE BLDC GLUCOMTR-MCNC: 110 MG/DL (ref 70–130)
HCT VFR BLD AUTO: 23.5 % (ref 37–47)
HGB BLD-MCNC: 7.5 G/DL (ref 12–16)
IMM GRANULOCYTES # BLD: 0.23 10*3/MM3 (ref 0–0.03)
IMM GRANULOCYTES NFR BLD: 4 % (ref 0–0.5)
LYMPHOCYTES # BLD AUTO: 1.13 10*3/MM3 (ref 1–3)
LYMPHOCYTES NFR BLD AUTO: 19.7 % (ref 21–51)
MCH RBC QN AUTO: 29.3 PG (ref 27–33)
MCHC RBC AUTO-ENTMCNC: 31.9 G/DL (ref 33–37)
MCV RBC AUTO: 91.8 FL (ref 80–94)
MONOCYTES # BLD AUTO: 0.55 10*3/MM3 (ref 0.1–0.9)
MONOCYTES NFR BLD AUTO: 9.6 % (ref 0–10)
NEUTROPHILS # BLD AUTO: 3.19 10*3/MM3 (ref 1.4–6.5)
NEUTROPHILS NFR BLD AUTO: 55.8 % (ref 30–70)
OSMOLALITY SERPL CALC.SUM OF ELEC: 283.4 MOSM/KG (ref 273–305)
PLATELET # BLD AUTO: 186 10*3/MM3 (ref 130–400)
PMV BLD AUTO: 9.4 FL (ref 6–10)
POTASSIUM BLD-SCNC: 4.3 MMOL/L (ref 3.5–5.3)
RBC # BLD AUTO: 2.56 10*6/MM3 (ref 4.2–5.4)
SODIUM BLD-SCNC: 140 MMOL/L (ref 135–153)
WBC NRBC COR # BLD: 5.73 10*3/MM3 (ref 4.5–12.5)

## 2018-02-16 PROCEDURE — 94799 UNLISTED PULMONARY SVC/PX: CPT

## 2018-02-16 PROCEDURE — 80048 BASIC METABOLIC PNL TOTAL CA: CPT | Performed by: INTERNAL MEDICINE

## 2018-02-16 PROCEDURE — 25010000002 HEPARIN (PORCINE) PER 1000 UNITS: Performed by: INTERNAL MEDICINE

## 2018-02-16 PROCEDURE — 25010000002 MEROPENEM

## 2018-02-16 PROCEDURE — 99239 HOSP IP/OBS DSCHRG MGMT >30: CPT | Performed by: INTERNAL MEDICINE

## 2018-02-16 PROCEDURE — 82962 GLUCOSE BLOOD TEST: CPT

## 2018-02-16 PROCEDURE — 85025 COMPLETE CBC W/AUTO DIFF WBC: CPT | Performed by: INTERNAL MEDICINE

## 2018-02-16 RX ORDER — ONDANSETRON 4 MG/1
4 TABLET, ORALLY DISINTEGRATING ORAL EVERY 8 HOURS PRN
Qty: 21 TABLET | Refills: 0 | Status: SHIPPED | OUTPATIENT
Start: 2018-02-16

## 2018-02-16 RX ADMIN — Medication 1 CAPSULE: at 09:06

## 2018-02-16 RX ADMIN — AMLODIPINE BESYLATE 10 MG: 10 TABLET ORAL at 09:06

## 2018-02-16 RX ADMIN — MEROPENEM 1 G: 1 INJECTION, POWDER, FOR SOLUTION INTRAVENOUS at 12:12

## 2018-02-16 RX ADMIN — FERROUS SULFATE TAB 325 MG (65 MG ELEMENTAL FE) 325 MG: 325 (65 FE) TAB at 09:08

## 2018-02-16 RX ADMIN — CARVEDILOL 25 MG: 25 TABLET, FILM COATED ORAL at 09:07

## 2018-02-16 RX ADMIN — CASTOR OIL AND BALSAM, PERU: 788; 87 OINTMENT TOPICAL at 09:20

## 2018-02-16 RX ADMIN — Medication 1 TABLET: at 09:07

## 2018-02-16 RX ADMIN — GABAPENTIN 200 MG: 100 CAPSULE ORAL at 08:58

## 2018-02-16 RX ADMIN — LEVOTHYROXINE SODIUM 50 MCG: 50 TABLET ORAL at 04:42

## 2018-02-16 RX ADMIN — HYDROXYZINE 25 MG: 25 TABLET, FILM COATED ORAL at 09:08

## 2018-02-16 RX ADMIN — HEPARIN SODIUM 5000 UNITS: 5000 INJECTION, SOLUTION INTRAVENOUS; SUBCUTANEOUS at 09:08

## 2018-02-16 RX ADMIN — POLYETHYLENE GLYCOL (3350) 17 G: 17 POWDER, FOR SOLUTION ORAL at 09:09

## 2018-02-16 RX ADMIN — MEROPENEM 1 G: 1 INJECTION, POWDER, FOR SOLUTION INTRAVENOUS at 00:06

## 2018-02-16 RX ADMIN — ATORVASTATIN CALCIUM 10 MG: 10 TABLET, FILM COATED ORAL at 09:08

## 2018-02-16 RX ADMIN — PANTOPRAZOLE SODIUM 40 MG: 40 TABLET, DELAYED RELEASE ORAL at 09:06

## 2018-02-16 RX ADMIN — BUSPIRONE HYDROCHLORIDE 10 MG: 10 TABLET ORAL at 09:05

## 2018-02-16 RX ADMIN — MEGESTROL ACETATE 200 MG: 40 SUSPENSION ORAL at 08:59

## 2018-02-16 RX ADMIN — DOCUSATE SODIUM 200 MG: 100 CAPSULE, LIQUID FILLED ORAL at 09:00

## 2018-02-16 RX ADMIN — OXCARBAZEPINE 150 MG: 300 TABLET, FILM COATED ORAL at 08:59

## 2018-02-16 NOTE — PROGRESS NOTES
"  I have personally seen and examined the patient today and discussed overnight interval progress and pertinent issues with nursing staff.    Subjective:    Overall patient is doing great.  Normal white count and no fever.    History taken from: patient chart RN      Vital Signs    /60 (BP Location: Right arm, Patient Position: Lying)  Pulse 66  Temp 98.8 °F (37.1 °C) (Oral)   Resp 20  Ht 167.6 cm (66\")  Wt 59.6 kg (131 lb 6 oz)  SpO2 97%  BMI 21.2 kg/m2    Temp:  [98 °F (36.7 °C)-99.1 °F (37.3 °C)] 98.8 °F (37.1 °C)      Intake/Output Summary (Last 24 hours) at 02/16/18 0859  Last data filed at 02/16/18 0407   Gross per 24 hour   Intake             1200 ml   Output                0 ml   Net             1200 ml     Intake & Output (last 3 days)       02/13 0701 - 02/14 0700 02/14 0701 - 02/15 0700 02/15 0701 - 02/16 0700 02/16 0701 - 02/17 0700    P.O.      IV Piggyback        Total Intake(mL/kg) 450 (7.6) 660 (11.1) 1440 (24.2)     Net +450 +660 +1440              Unmeasured Urine Occurrence 9 x 9 x 11 x     Unmeasured Stool Occurrence 1 x 3 x 3 x           Physical Exam:       General Appearance:    Alert, cooperative, in no acute distress   Head:    Normocephalic, without obvious abnormality, atraumatic   Eyes:            Lids and lashes normal, conjunctivae and sclerae normal, no   icterus, no pallor, corneas clear, PERRLA   Ears:    Ears appear intact with no abnormalities noted   Throat:   No oral lesions, no thrush, oral mucosa moist   Neck:   No adenopathy, supple, trachea midline, no thyromegaly, no   carotid bruit, no JVD   Back:     No tenderness to percussion or palpation, range of motion   normal   Lungs:     Clear to auscultation,respirations regular, even and unlabored. No wheezing, no ronchi and no crackles.    Heart:    Regular rhythm and normal rate, normal S1 and S2, no            murmur, no gallop, no rub, no click   Chest Wall:    No abnormalities observed   Abdomen:   " Mild side pain    Rectal:     Deferred   Extremities:   Moves all extremities well, no edema, no cyanosis, no             redness   Pulses:   Pulses palpable and equal bilaterally   Skin:   No bleeding, bruising or rash   Lymph nodes:   No palpable adenopathy   Neurologic:   Awake, alert and oriented x 3. Following commands.         Results:      Results from last 7 days  Lab Units 02/16/18  0447 02/15/18  0103 02/14/18  0403 02/13/18  0804 02/12/18 2026 02/10/18  1737   WBC 10*3/mm3 5.73 4.86 5.39 4.14* 4.60 7.33     Lab Results   Component Value Date    NEUTROABS 3.19 02/16/2018         Results from last 7 days  Lab Units 02/16/18  0448   CREATININE mg/dL 0.95         Results from last 7 days  Lab Units 02/14/18  0403 02/13/18  0804   CRP mg/dL 1.40* 2.84*       Results Review:    I have personally reviewed laboratory data, culture results, radiology studies and antimicrobial therapy.    Hospital Medications (active)       Dose Frequency Start End    amLODIPine (NORVASC) tablet 10 mg 10 mg Daily 2/13/2018     Sig - Route: Take 1 tablet by mouth Daily. - Oral    atorvastatin (LIPITOR) tablet 10 mg 10 mg Daily 2/13/2018     Sig - Route: Take 1 tablet by mouth Daily. - Oral    busPIRone (BUSPAR) tablet 10 mg 10 mg Every 12 Hours Scheduled 2/13/2018     Sig - Route: Take 1 tablet by mouth Every 12 (Twelve) Hours. - Oral    carvedilol (COREG) tablet 25 mg 25 mg 2 Times Daily With Meals 2/13/2018     Sig - Route: Take 1 tablet by mouth 2 (Two) Times a Day With Meals. - Oral    castor oil-balsam peru (VENELEX) ointment  2 Times Daily 2/13/2018     Sig - Route: Apply  topically 2 (Two) Times a Day. - Topical    docusate sodium (COLACE) capsule 200 mg 200 mg Daily 2/13/2018     Sig - Route: Take 2 capsules by mouth Daily. - Oral    ferrous sulfate tablet 325 mg 325 mg Daily 2/13/2018     Sig - Route: Take 1 tablet by mouth Daily. - Oral    gabapentin (NEURONTIN) capsule 200 mg 200 mg 2 Times Daily 2/13/2018     Sig -  Route: Take 2 capsules by mouth 2 (Two) Times a Day. - Oral    heparin (porcine) 5000 UNIT/ML injection 5,000 Units 5,000 Units Every 12 Hours Scheduled 2/13/2018     Sig - Route: Inject 1 mL under the skin Every 12 (Twelve) Hours. - Subcutaneous    hydrOXYzine (ATARAX) tablet 25 mg 25 mg 2 Times Daily 2/13/2018     Sig - Route: Take 1 tablet by mouth 2 (Two) Times a Day. - Oral    insulin detemir (LEVEMIR) injection 15 Units 15 Units Nightly 2/13/2018     Sig - Route: Inject 15 Units under the skin Every Night. - Subcutaneous    lactobacillus acidophilus (RISAQUAD) capsule 1 capsule 1 capsule Daily 2/13/2018     Sig - Route: Take 1 capsule by mouth Daily. - Oral    lactulose (CHRONULAC) 10 GM/15ML solution 10 g 10 g 2 Times Daily 2/13/2018     Sig - Route: Take 15 mL by mouth 2 (Two) Times a Day. - Oral    levothyroxine (SYNTHROID, LEVOTHROID) tablet 50 mcg 50 mcg Every Early Morning 2/13/2018     Sig - Route: Take 1 tablet by mouth Every Morning. - Oral    megestrol (MEGACE) 40 MG/ML suspension 200 mg 200 mg 2 Times Daily 2/13/2018     Sig - Route: Take 5 mL by mouth 2 (Two) Times a Day. - Oral    meropenem (MERREM) 1 g/100 mL 0.9% NS VTB (mbp) 1 g Every 12 Hours 2/14/2018 2/20/2018    Sig - Route: Infuse 100 mL into a venous catheter Every 12 (Twelve) Hours. - Intravenous    multivitamin (DAILY DARRELL) tablet 1 tablet 1 tablet Daily 2/13/2018     Sig - Route: Take 1 tablet by mouth Daily. - Oral    nitroglycerin (NITROSTAT) SL tablet 0.4 mg 0.4 mg Every 5 Minutes PRN 2/12/2018     Sig - Route: Place 1 tablet under the tongue Every 5 (Five) Minutes As Needed for Chest Pain (if systolic BP greater than 100 mm/Hg.). - Sublingual    ondansetron (ZOFRAN) tablet 4 mg 4 mg Every 6 Hours PRN 2/13/2018     Sig - Route: Take 1 tablet by mouth Every 6 (Six) Hours As Needed for Nausea or Vomiting. - Oral    OXcarbazepine (TRILEPTAL) tablet 150 mg 150 mg Every 12 Hours Scheduled 2/13/2018     Sig - Route: Take 0.5 tablets by  mouth Every 12 (Twelve) Hours. - Oral    oxyCODONE (ROXICODONE) immediate release tablet 5 mg 5 mg Every 4 Hours PRN 2/13/2018     Sig - Route: Take 1 tablet by mouth Every 4 (Four) Hours As Needed for Moderate Pain . - Oral    pantoprazole (PROTONIX) EC tablet 40 mg 40 mg 2 Times Daily 2/13/2018     Sig - Route: Take 1 tablet by mouth 2 (Two) Times a Day. - Oral    sodium chloride 0.9 % flush 1-10 mL 1-10 mL As Needed 2/12/2018     Sig - Route: Infuse 1-10 mL into a venous catheter As Needed for Line Care. - Intravenous    meropenem (MERREM) 500mg/100 mL 0.9% NS IVPB (mbp) (Discontinued) 500 mg Every 8 Hours 2/13/2018 2/14/2018    Sig - Route: Infuse 100 mL into a venous catheter Every 8 (Eight) Hours. - Intravenous            Cultures:    Blood Culture   Date Value Ref Range Status   02/12/2018 No growth at 24 hours  Preliminary   02/12/2018 No growth at 24 hours  Preliminary   02/10/2018 Escherichia coli (A)  Final   02/10/2018 Escherichia coli (A)  Final     Urine Culture   Date Value Ref Range Status   02/12/2018 (A)  Preliminary    >100,000 CFU/mL Gram Negative Bacilli, Morphology consistent with Escherichia / Citrobacter   02/10/2018 >100,000 CFU/mL Escherichia coli ESBL (C)  Preliminary     Comment:       Consider infectious disease consult.  Susceptibility results may not correlate to clinical outcomes.   02/10/2018 (A)  Preliminary    50,000-60,000 CFU/mL Gram Positive Cocci, Morphology consistent with Group D Enterococcus           Assessment/Plan     ASSESSMENT:    1. Bacteremia  2.  Acute pyelonephritis     PLAN:    Overall patient is doing great.  Normal white count and no fever.      Blood cultures from 2/10/2018 finalized as non-ESBL Escherichia coli but urine culture from 2/10/2018 so far showing ESBL Escherichia coli and enterococcus.  Enterococcus is most likely colonization rather than true active infection. Non-ESBL Escherichia coli is most likely the culprit but urine most likely has several  strains of Escherichia coli with predominant growth of ESBL in the urine culture.  Overall fairly complicated case and would not feel comfortable not covering ESBL.     Recommend to complete meropenem or de-escalate to ertapenem for easier outpatient therapy and would recommend to continue course of meropenem through 2/20/2018.    ID will sign off.  Please let us know if we can further assist in this case thank you!     Patient's findings and recommendations were discussed with patient and nursing staff    Code Status: Conditional Code     Scribed for ANICETO Victoria  02/16/18  8:59 AM      Physician Attestation:    The documentation recorded by the scribe accurately reflects the service I personally performed and the decisions made by me.    Leah Cedillo MD  Infectious Diseases  02/16/18  9:07 AM

## 2018-02-16 NOTE — DISCHARGE SUMMARY
Discharge Summary:    Date of Admission: 2/12/2018  Date of Discharge:  2/16/2018    PCP: Sandra Aponte MD      DISCHARGE DIAGNOSIS  -E coli bacteremia  -History of ESBL Escherichia coli UTI; urine culture from February 10 revealing possible VRE that may be a contaminant but also revealing ESBL Escherichia coli  -Nausea and abdominal pain, resolved  -Acute on chronic renal failure that has improved  -Acute on chronic normocytic anemia  -Functional quadriplegia secondary to a C5 fracture  -Insulin-dependent diabetes mellitus type 2 that is controlled  -Thrombocytopenia that has resolved    SECONDARY DIAGNOSES  Past Medical History:   Diagnosis Date   • Anterolisthesis     C5-C6 with perched facet   • C5 vertebral fracture    • CAD (coronary artery disease)    • Cardiac disorder    • Cirrhosis    • Constipation    • COPD (chronic obstructive pulmonary disease)    • Diabetes mellitus    • End stage liver disease    • Functional quadriplegia    • Glaucoma    • History of ESBL E. coli infection 12/2017    Urine   • Hx of hepatitis C    • Hypertension    • Hypothyroidism    • MRSA cellulitis 12/2017    Leg   • Pseudomonas urinary tract infection 12/2017   • Urinary tract infection        CONSULTS   Dr. Cedillo with infectious disease    PROCEDURES PERFORMED  Echocardiogram:  Interpretation Summary      · Normal left ventricular cavity size and wall thickness noted. All left ventricular wall segments contract normally.  · Estimated EF appears to be in the range of 66 - 70%.  · The aortic valve is abnormal in structure. There is mild calcification of the aortic valve mainly affecting the non coronary cusp(s).No aortic valve regurgitation is present. Mild aortic valve stenosis is present.  · The mitral valve is normal in structure. Mild mitral valve regurgitation is present. No significant mitral valve stenosis is present.  · The tricuspid valve is grossly normal. Mild tricuspid valve regurgitation is present. Estimated  "right ventricular systolic pressure from tricuspid regurgitation is markedly elevated (>55 mmHg).  · There is no evidence of pericardial effusion.       HOSPITAL COURSE  Patient is a 62 y.o. female presented to [facility] complaining of worsening confusion/positive blood cultures.  Please see the admitting history and physical for further details.      The patient was found to have a gram-negative annalisa UTI with gram-negative bacteremia.  The patient was initially started on meropenem.  A transthoracic echocardiogram was performed and was negative for endocarditis.  She initially had thrombocytopenia upon admission and was not given subcutaneous heparin.  Her spironolactone was initially held due to some hypotension.    Infectious disease was consulted early on during her hospital stay.  Her blood cultures revealed Escherichia coli.  The patient's lethargy and confusion greatly improved during her hospitalization.  She did complain of some nausea and abdominal pain during her hospital stay.  A KUB was obtained and was found to have a moderate amount of stool in the colon.    The patient's thrombocytopenia resolved while hospitalized.  Repeat blood cultures revealed no growth to date.  As the patient would be returning to the nursing home, her meropenem was changed to ertapenem for easier outpatient therapy.  Patient is to continue her IV antibiotics through 02/20/2018.  It was felt that the patient was in a stable condition to return to the nursing facility.  He is discharged via EMS.      CONDITION ON DISCHARGE  Stable.      VITAL SIGNS  /60 (BP Location: Right arm, Patient Position: Lying)  Pulse 66  Temp 98.8 °F (37.1 °C) (Oral)   Resp 20  Ht 167.6 cm (66\")  Wt 59.6 kg (131 lb 6 oz)  SpO2 97%  BMI 21.2 kg/m2  Objective   Constitutional: She appears well-developed and well-nourished. No distress.   HENT:   Head: Normocephalic and atraumatic.   Nose: Nose normal.   Mouth/Throat: Oropharynx is " clear and moist and mucous membranes are normal.   Eyes: Conjunctivae and EOM are normal. Pupils are equal, round, and reactive to light. No scleral icterus.   Neck: Trachea normal. Neck supple. No JVD present. Carotid bruit is not present. No thyromegaly present.   Cardiovascular: Normal rate, regular rhythm and normal heart sounds.  Exam reveals no gallop and no friction rub.    No murmur heard.  Pulmonary/Chest: Effort normal. No respiratory distress. She has no wheezes. She has no rales.   Abdominal: Soft. Bowel sounds are normal. She exhibits no distension. There is no tenderness.. There is no guarding.   Neurological: She is alert. No cranial nerve deficit. Oriented to self and place; follows commands; able to move extremities.  Skin: Skin is warm, dry and intact. No rash noted. No erythema.   A few scattered bruises noted on upper extremities   Psychiatric: She has a normal mood and affect. Her speech is normal.     DISCHARGE DISPOSITION   Skilled Nursing Facility (DC - External)    DISCHARGE MEDICATIONS   Lily Ace   Home Medication Instructions TONI:[id number]    Printed on:02/16/18 9445   Medication Information                      acidophilus (FLORANEX) tablet tablet  Take 1 tablet by mouth 2 (Two) Times a Day.             amLODIPine (NORVASC) 10 MG tablet  Take 10 mg by mouth Daily.             atorvastatin (LIPITOR) 10 MG tablet  Take 10 mg by mouth Every Night.             busPIRone (BUSPAR) 10 MG tablet  Take 10 mg by mouth Every 12 (Twelve) Hours.             carvedilol (COREG) 25 MG tablet  Take 25 mg by mouth 2 (Two) Times a Day.             docusate sodium (COLACE) 250 MG capsule  Take 250 mg by mouth Daily.             ertapenem (INVanz) 1 g in 0.9% sodium chloride IVPB  Infuse 1 g into a venous catheter Daily for 4 days.             ferrous sulfate 325 (65 FE) MG tablet  Take 325 mg by mouth Daily.             gabapentin (NEURONTIN) 400 MG capsule  Take 400 mg by mouth 2 (Two) Times a  Day.             hydrALAZINE (APRESOLINE) 25 MG tablet  Take 25 mg by mouth 2 (Two) Times a Day.             hydrOXYzine (VISTARIL) 25 MG capsule  Take 25 mg by mouth 2 (Two) Times a Day.             insulin aspart (novoLOG) 100 UNIT/ML injection  Inject 8-10 Units under the skin 3 (Three) Times a Day.             Insulin Glargine (LANTUS SOLOSTAR) 100 UNIT/ML solution pen-injector  Inject 15 Units under the skin Every Night.             lactulose (CHRONULAC) 10 GM/15ML solution  Take 10 g by mouth 2 (Two) Times a Day.             levothyroxine (SYNTHROID, LEVOTHROID) 50 MCG tablet  Take 50 mcg by mouth Daily.             linaclotide (LINZESS) 145 MCG capsule capsule  Take 145 mcg by mouth Daily.             megestrol (MEGACE) 40 MG/ML suspension  Take 200 mg by mouth 2 (Two) Times a Day.             multivitamin (DAILY DARRELL) tablet tablet  Take 1 tablet by mouth Daily.             ondansetron ODT (ZOFRAN ODT) 4 MG disintegrating tablet  Take 1 tablet by mouth Every 8 (Eight) Hours As Needed for Nausea or Vomiting.             OXcarbazepine (TRILEPTAL) 150 MG tablet  Take 150 mg by mouth 2 (Two) Times a Day.             oxyCODONE (ROXICODONE) 10 MG tablet  Take 10 mg by mouth Every 4 (Four) Hours As Needed for Moderate Pain .             pantoprazole (PROTONIX) 40 MG EC tablet  Take 40 mg by mouth 2 (Two) Times a Day.             polyethylene glycol (MIRALAX) pack packet  Take 17 g by mouth Daily.             spironolactone (ALDACTONE) 100 MG tablet  Take 100 mg by mouth Daily.                 DISCHARGE DIET  cardiac diet, soft texture/chopped meats/thin liquids       Dietary Orders            Start     Ordered    02/13/18 1430  Diet Soft Texture; Chopped; Thin; Cardiac  Diet Effective Now     Question Answer Comment   Diet Texture / Consistency Soft Texture    Select Texture: Chopped    Fluid Consistency Thin    Common Modifiers Cardiac        02/13/18 1430          ACTIVITY AT DISCHARGE   activity as  tolerated    Additional Instructions for the Follow-ups that You Need to Schedule     Discharge Follow-up with PCP    As directed    Follow Up Details:  PCP in 7-10 days                     TEST  RESULTS PENDING AT DISCHARGE   Order Current Status    Blood Culture - Blood, Preliminary result    Blood Culture - Blood, Preliminary result             Cami Marmolejo DO  02/16/18  11:05 AM      Time: greater than 30 minutes.

## 2018-02-16 NOTE — DISCHARGE PLACEMENT REQUEST
"Ej Ace (62 y.o. Female)     Date of Birth Social Security Number Address Home Phone MRN    1955  [id number] [place] [address] [phone] [id number]    Protestant Marital Status          Denominational        Admission Date Admission Type Admitting Provider Attending Provider Department, Room/Bed    18 Emergency Jamel Stovall MD Grace, Aimee Russell, DO [address], [address]    Discharge Date Discharge Disposition Discharge Destination         Skilled Nursing Facility (DC - External)             Attending Provider: Cami Marmolejo DO     Allergies:  No Known Allergies    Isolation:  Contact   Infection:  VRE (18), MDRO (18), ESBL E coli (17), MRSA (17)   Code Status:  Conditional    Ht:  167.6 cm (66\")   Wt:  59.6 kg (131 lb 6 oz)    Admission Cmt:  None   Principal Problem:  None                Active Insurance as of 2018     Primary Coverage     Payor Plan Insurance Group Employer/Plan Group    [place]      Payor Plan Address Payor Plan Phone Number Effective From Effective To    [address] [phone] 2017     [place] [address]       Subscriber Name Subscriber Birth Date Member ID       EJ ACE [id number]                 Emergency Contacts      (Rel.) Home Phone Work Phone Mobile Phone    Vasiliy Dominguez (Other) [phone] -- --    Ivonne Dominguez (Mother) [phone] -- --               History & Physical      Jamel Stovall MD at 2018  3:04 AM              [facility] HOSPITALIST HISTORY AND PHYSICAL    Patient Identification:  Name:  Ej Ace  Age:  62 y.o.  Sex:  female  :  1955  MRN:  [id number]   Visit Number:  [id number]  Primary Care Physician:  Sandra Aponte MD     Chief complaint:  Told to come back to the ED due to positive blood cultures    History of presenting illness:  62 y.o. female who presented " to [facility] emergency Department positive blood cultures.  The patient seems confused to me during my examination of her and she seems confused to her nurse, Mayuri.  The patient does not have a history of dementia but because she appears confused and I'm unable to obtain a history from her.  The patient initially came to the emergency department from [facility] [place]10/2018 with complaints of fever of 103 at the nursing home.  Her urinalysis was suspicious for urinary tract infection.  She was given Rocephin and sent back to the [facility]; a urine culture and blood cultures were obtained.  At the [facility] home, the patient was started on Zosyn.  Blood cultures started growing a gram-negative bacilli consistent with Escherichia or Citrobacter, 2 out of 2 blood cultures; this is the same type of bacteria that the urine culture is also growing from this same emergency department visit.  The emergency department called the [facility] home and the patient was sent due to the bacteremia.  When asked about the above, the patient is unable to really tell me about the details.  She notes that she had a fever at the nursing home but she couldn't really tell me about her liver disease or what is being done about it.  ---------------------------------------------------------------------------------------------------------------------   Review of Systems   Unable to perform ROS: Mental status change    ---------------------------------------------------------------------------------------------------------------------   Past Medical History:   Diagnosis Date   • Anterolisthesis     C5-C6 with perched facet   • C5 vertebral fracture    • CAD (coronary artery disease)    • Cardiac disorder    • Cirrhosis    • Constipation    • COPD (chronic obstructive pulmonary disease)    • Diabetes mellitus    • End stage liver disease    • Glaucoma    • History of ESBL E. coli infection 12/2017    Urine   • Hx of  hepatitis C    • Hypertension    • Hypothyroidism    • MRSA cellulitis 12/2017    Leg   • Pseudomonas urinary tract infection 12/2017   • Urinary tract infection      Past Surgical History:   Procedure Laterality Date   • ABDOMINAL WALL MESH  REMOVAL     • HERNIA REPAIR     • HYSTERECTOMY     • KIDNEY SURGERY       Family History   Problem Relation Age of Onset   • Heart disease Other    • Diabetes Other    • Hypertension Other    • Cancer Other      Social History   • Marital status:      Social History Main Topics   • Smoking status: Current Every Day Smoker     Types: Cigarettes   • Smokeless tobacco: Never Used   • Alcohol use No   • Drug use: No   • Sexual activity: Defer   ---------------------------------------------------------------------------------------------------------------------   Allergies:  Review of patient's allergies indicates no known allergies.  ---------------------------------------------------------------------------------------------------------------------   Prior to Admission Medications     Prescriptions Last Dose Informant Patient Reported? Taking?    acidophilus (FLORANEX) tablet tablet 2/12/2018 Nursing Home Yes Yes    Take 1 tablet by mouth 2 (Two) Times a Day.    amLODIPine (NORVASC) 10 MG tablet 2/12/2018 Nursing Home Yes Yes    Take 10 mg by mouth Daily.    busPIRone (BUSPAR) 10 MG tablet 2/12/2018 Nursing Home Yes Yes    Take 10 mg by mouth Every 12 (Twelve) Hours.    carvedilol (COREG) 25 MG tablet 2/12/2018 Nursing Home Yes Yes    Take 25 mg by mouth 2 (Two) Times a Day.    docusate sodium (COLACE) 250 MG capsule 2/12/2018 prison Yes Yes    Take 250 mg by mouth Daily.    DORIPENEM IV 2/12/2018 Nursing Home Yes Yes    Infuse 500 mg into a venous catheter Every 8 (Eight) Hours.    ferrous sulfate 325 (65 FE) MG tablet 2/12/2018 Nursing Home Yes Yes    Take 325 mg by mouth Daily.    gabapentin (NEURONTIN) 400 MG capsule 2/12/2018 prison Yes Yes    Take 400 mg  by mouth 2 (Two) Times a Day.    hydrALAZINE (APRESOLINE) 25 MG tablet 2/12/2018 Nursing Home Yes Yes    Take 25 mg by mouth 2 (Two) Times a Day.    hydrOXYzine (VISTARIL) 25 MG capsule 2/12/2018 [facility] Yes Yes    Take 25 mg by mouth 2 (Two) Times a Day.    insulin aspart (novoLOG) 100 UNIT/ML injection 2/11/2018 Nursing Home Yes Yes    Inject 8-10 Units under the skin 3 (Three) Times a Day.    lactulose (CHRONULAC) 10 GM/15ML solution 2/12/2018 Nursing Home Yes Yes    Take 10 g by mouth 2 (Two) Times a Day.    levothyroxine (SYNTHROID, LEVOTHROID) 50 MCG tablet 2/12/2018 Nursing Home Yes Yes    Take 50 mcg by mouth Daily.    linaclotide (LINZESS) 145 MCG capsule capsule 2/12/2018 [facility] Yes Yes    Take 145 mcg by mouth Daily.    megestrol (MEGACE) 40 MG/ML suspension 2/12/2018 Nursing Home Yes Yes    Take 200 mg by mouth 2 (Two) Times a Day.    multivitamin (DAILY DARRELL) tablet tablet 2/12/2018 Nursing Home Yes Yes    Take 1 tablet by mouth Daily.    OXcarbazepine (TRILEPTAL) 150 MG tablet 2/12/2018 Nursing Home Yes Yes    Take 150 mg by mouth 2 (Two) Times a Day.    oxyCODONE (ROXICODONE) 10 MG tablet 2/9/2018 Nursing Home Yes Yes    Take 10 mg by mouth Every 4 (Four) Hours As Needed for Moderate Pain .    pantoprazole (PROTONIX) 40 MG EC tablet 2/12/2018 Nursing Home Yes Yes    Take 40 mg by mouth 2 (Two) Times a Day.    spironolactone (ALDACTONE) 100 MG tablet 2/12/2018 Nursing Home Yes Yes    Take 100 mg by mouth Daily.    atorvastatin (LIPITOR) 10 MG tablet 2/11/2018 Nursing Home Yes No    Take 10 mg by mouth Every Night.    Insulin Glargine (LANTUS SOLOSTAR) 100 UNIT/ML solution pen-injector 2/11/2018 Nursing Home Yes No    Inject 15 Units under the skin Every Night.   ---------------------------------------------------------------------------------------------------------------------   Vital Signs:  Temp:  [98.7 °F (37.1 °C)-99.3 °F (37.4 °C)] 98.7 °F (37.1 °C)  Heart Rate:  [60-63] 61  Resp:   [18] 18  BP: (127-146)/() 136/72  Last 3 weights    02/12/18  1901 02/12/18  2339   Weight: 59 kg (130 lb) 59.6 kg (131 lb 6 oz)     Body mass index is 21.2 kg/(m^2).  ---------------------------------------------------------------------------------------------------------------------   Physical Exam:  Constitutional:  Well-developed and well-nourished.  No respiratory distress.  Seems confused.    HENT:  Head: Normocephalic and atraumatic.  Mouth:  Moist mucous membranes.    Eyes:  Conjunctivae and EOM are normal.  Pupils are equal, round, and reactive to light.  No scleral icterus.  Neck:  Neck supple.  No JVD present.    Cardiovascular:  Normal rate, regular rhythm and normal heart sounds with no murmur.  Pulmonary/Chest:  No respiratory distress, no wheezes, no crackles, with normal breath sounds and good air movement.  Abdominal:  Soft.  Bowel sounds are normal.  Distension and no tenderness to palpation.  There is a fluid wave.   Musculoskeletal:  No edema, no tenderness, and no deformity.  No red or swollen joints anywhere.    Neurological:  Alert and oriented to person, place, and year but not to her medical history.  No cranial nerve deficit.  No tongue deviation.  No facial droop.  No slurred speech.  Can follow simple commands.  Skin:  Skin is warm and dry.  No rash noted.  No pallor.   Peripheral vascular:  No edema and strong pulses on all 4 extremities.  Genitourinary:  No monte catheter in place.  ---------------------------------------------------------------------------------------------------------------------  EKG:  NS, HR with heart rate of 61, QTc 442 ms.  Telemetry:  NS in the 60's.  I have personally looked at both the EKG and the telemetry strips.  ---------------------------------------------------------------------------------------------------------------------  Results from last 7 days  Lab Units 02/12/18  2026 02/10/18  1737   LACTATE mmol/L 0.7 0.7   WBC 10*3/mm3 4.60 7.33    HEMOGLOBIN g/dL 7.3* 7.2*   HEMATOCRIT % 23.0* 22.0*   MCV fL 91.3 90.9   MCHC g/dL 31.7* 32.7*   PLATELETS 10*3/mm3 101* 61*   INR  0.99  --      Results from last 7 days  Lab Units 02/12/18  2026 02/10/18  1737   SODIUM mmol/L 136 130*   POTASSIUM mmol/L 5.0 4.8   CHLORIDE mmol/L 112 104   CO2 mmol/L 16.2* 18.5*   BUN mg/dL 55* 64*   CREATININE mg/dL 1.73* 1.93*   EGFR IF NONAFRICN AM mL/min/1.73 30* 26*   CALCIUM mg/dL 8.3 8.4   GLUCOSE mg/dL 182* 144*   ALBUMIN g/dL 3.10* 3.20*   BILIRUBIN mg/dL 0.1* 0.3   ALK PHOS U/L 90 72   AST (SGOT) U/L 34* 25   ALT (SGPT) U/L 25 22   Estimated Creatinine Clearance: 31.3 mL/min (by C-G formula based on Cr of 1.73).     Ammonia   Date Value Ref Range Status   02/10/2018 20 11 - 51 umol/L Final     Results from last 7 days  Lab Units 02/12/18 2026   TROPONIN I ng/mL <0.006       Lab Results   Component Value Date    HGBA1C 6.20 (H) 11/30/2017     Lab Results   Component Value Date    TSH 4.149 02/07/2018    FREET4 1.12 11/30/2017     Blood Culture   Date Value Ref Range Status   02/10/2018 (A)  Preliminary    Gram Negative Bacilli, Morphology consistent with Escherichia / Citrobacter   02/10/2018 (A)  Preliminary    Gram Negative Bacilli, Morphology consistent with Escherichia / Citrobacter     Urine Culture   Date Value Ref Range Status   02/10/2018 (A)  Preliminary    >100,000 CFU/mL Gram Negative Bacilli, Morphology consistent with Escherichia / Citrobacter     Past Cultures:       I have personally looked at the labs and they are stated above.  ---------------------------------------------------------------------------------------------------------------------  Imaging Results (last 7 days)     Procedure Component Value Units Date/Time    XR Chest 1 View [[id number]]:  I don't see any infiltrates.  I await the final radiology read.   Updated:  02/12/18 2055        I have personally reviewed the radiology images and read the final radiology  report.  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  The patient used to have an indwelling Groev catheter but this has been removed for at least a couple months and she now uses adult briefs.  We will consults infectious disease because of the bacteremia.  Transthoracic echo has been ordered to evaluate her for possible endocarditis.  We cannot give her in the heparin for DVT prophylaxis due to her thrombocytopenia.  We will repeat her blood work in the morning.  For now, I'm holding her spironolactone and may restart it next day or 2 depending on how her blood pressures do at she does have the infection.    Jamel Stovall MD  02/13/18  3:04 AM         Electronically signed by Jamel Stovall MD at 2/13/2018  6:32 AM        Vital Signs (last 24 hours)       02/15 0700  -  02/16 0659 02/16 0700  -  02/16 1107   Most Recent    Temp (°F) 98 -  99.1       98.8 (37.1)    Heart Rate 57 -  66       66    Resp 18 -  20       20    /55 -  163/84       158/60    SpO2 (%) 96 -  98      97     97          Intake & Output (last day)       02/15 0701 - 02/16 0700 02/16 0701 - 02/17 0700    P.O. 1440     Total Intake(mL/kg) 1440 (24.2)     Net +1440            Unmeasured Urine Occurrence 11 x     Unmeasured Stool Occurrence 3 x         Hospital Medications (active)       Dose Frequency Start End     amLODIPine (NORVASC) tablet 10 mg 10 mg Daily 2/13/2018     Sig - Route: Take 1 tablet by mouth Daily. - Oral    atorvastatin (LIPITOR) tablet 10 mg 10 mg Daily 2/13/2018     Sig - Route: Take 1 tablet by mouth Daily. - Oral    busPIRone (BUSPAR) tablet 10 mg 10 mg Every 12 Hours Scheduled 2/13/2018     Sig - Route: Take 1 tablet by mouth Every 12 (Twelve) Hours. - Oral    carvedilol (COREG) tablet 25 mg 25 mg 2 Times Daily With Meals 2/13/2018     Sig - Route: Take 1 tablet by mouth 2 (Two) Times a Day With Meals. - Oral    castor oil-balsam peru (VENELEX) ointment  2 Times Daily 2/13/2018     Sig - Route: Apply  topically 2 (Two) Times a Day. - Topical    docusate sodium (COLACE) capsule 200 mg 200 mg Daily 2/13/2018     Sig - Route: Take 2 capsules by mouth Daily. - Oral    ferrous sulfate tablet 325 mg 325 mg Daily 2/13/2018     Sig - Route: Take 1 tablet by mouth Daily. - Oral    gabapentin (NEURONTIN) capsule 200 mg 200 mg 2 Times Daily 2/13/2018     Sig - Route: Take 2 capsules by mouth 2 (Two) Times a Day. - Oral    heparin (porcine) 5000 UNIT/ML injection 5,000 Units 5,000 Units Every 12 Hours Scheduled 2/13/2018     Sig - Route: Inject 1 mL under the skin Every 12 (Twelve) Hours. - Subcutaneous    hydrOXYzine (ATARAX) tablet 25 mg 25 mg 2 Times Daily 2/13/2018     Sig - Route: Take 1 tablet by mouth 2 (Two) Times a Day. - Oral    insulin detemir (LEVEMIR) injection 15 Units 15 Units Nightly 2/13/2018     Sig - Route: Inject 15 Units under the skin Every Night. - Subcutaneous    lactobacillus acidophilus (RISAQUAD) capsule 1 capsule 1 capsule Daily 2/13/2018     Sig - Route: Take 1 capsule by mouth Daily. - Oral    lactulose (CHRONULAC) 10 GM/15ML solution 20 g 20 g 2 Times Daily 2/15/2018     Sig - Route: Take 30 mL by mouth 2 (Two) Times a Day. - Oral    levothyroxine (SYNTHROID, LEVOTHROID) tablet 50 mcg 50 mcg Every Early Morning 2/13/2018     Sig - Route: Take 1 tablet by mouth Every Morning. - Oral     megestrol (MEGACE) 40 MG/ML suspension 200 mg 200 mg 2 Times Daily 2/13/2018     Sig - Route: Take 5 mL by mouth 2 (Two) Times a Day. - Oral    meropenem (MERREM) 1 g/100 mL 0.9% NS VTB (mbp) 1 g Every 12 Hours 2/14/2018 2/20/2018    Sig - Route: Infuse 100 mL into a venous catheter Every 12 (Twelve) Hours. - Intravenous    multivitamin (DAILY DARRELL) tablet 1 tablet 1 tablet Daily 2/13/2018     Sig - Route: Take 1 tablet by mouth Daily. - Oral    nitroglycerin (NITROSTAT) SL tablet 0.4 mg 0.4 mg Every 5 Minutes PRN 2/12/2018     Sig - Route: Place 1 tablet under the tongue Every 5 (Five) Minutes As Needed for Chest Pain (if systolic BP greater than 100 mm/Hg.). - Sublingual    ondansetron (ZOFRAN) tablet 4 mg 4 mg Every 6 Hours PRN 2/13/2018     Sig - Route: Take 1 tablet by mouth Every 6 (Six) Hours As Needed for Nausea or Vomiting. - Oral    OXcarbazepine (TRILEPTAL) tablet 150 mg 150 mg Every 12 Hours Scheduled 2/13/2018     Sig - Route: Take 0.5 tablets by mouth Every 12 (Twelve) Hours. - Oral    oxyCODONE (ROXICODONE) immediate release tablet 5 mg 5 mg Every 4 Hours PRN 2/13/2018     Sig - Route: Take 1 tablet by mouth Every 4 (Four) Hours As Needed for Moderate Pain . - Oral    pantoprazole (PROTONIX) EC tablet 40 mg 40 mg 2 Times Daily 2/13/2018     Sig - Route: Take 1 tablet by mouth 2 (Two) Times a Day. - Oral    polyethylene glycol 3350 powder (packet) 17 g Daily 2/15/2018     Sig - Route: Take 17 g by mouth Daily. - Oral    sodium chloride 0.9 % flush 1-10 mL 1-10 mL As Needed 2/12/2018     Sig - Route: Infuse 1-10 mL into a venous catheter As Needed for Line Care. - Intravenous    lactulose (CHRONULAC) 10 GM/15ML solution 10 g (Discontinued) 10 g 2 Times Daily 2/13/2018 2/15/2018    Sig - Route: Take 15 mL by mouth 2 (Two) Times a Day. - Oral            Lab Results (last 24 hours)     Procedure Component Value Units Date/Time    POC Glucose Once [[id number]]  (Abnormal) Collected:  02/15/18 2002     Specimen:  Blood Updated:  02/15/18 2008     Glucose 242 (H) mg/dL     Narrative:       Meter: [id number] : [id number] veronica mckeon    Blood Culture - Blood, [[id number]]  (Normal) Collected:  02/12/18 2026    Specimen:  Blood from Arm, Left Updated:  02/15/18 2101     Blood Culture No growth at 3 days    Blood Culture - Blood, [[id number]]  (Normal) Collected:  02/12/18 2049    Specimen:  Blood from Arm, Right Updated:  02/15/18 2116     Blood Culture No growth at 3 days    CBC & Differential [[id number]] Collected:  02/16/18 0447    Specimen:  Blood Updated:  02/16/18 0536    Narrative:       The following orders were created for panel order CBC & Differential.  Procedure                               Abnormality         Status                     ---------                               -----------         ------                     CBC Auto Differential[[id number]]        Abnormal            Final result                 Please view results for these tests on the individual orders.    CBC Auto Differential [[id number]]  (Abnormal) Collected:  02/16/18 0447    Specimen:  Blood Updated:  02/16/18 0536     WBC 5.73 10*3/mm3      RBC 2.56 (L) 10*6/mm3      Hemoglobin 7.5 (L) g/dL      Hematocrit 23.5 (L) %      MCV 91.8 fL      MCH 29.3 pg      MCHC 31.9 (L) g/dL      RDW 14.1 %      RDW-SD 45.4 fl      MPV 9.4 fL      Platelets 186 10*3/mm3      Neutrophil % 55.8 %      Lymphocyte % 19.7 (L) %      Monocyte % 9.6 %      Eosinophil % 10.6 (H) %      Basophil % 0.3 %      Immature Grans % 4.0 (H) %      Neutrophils, Absolute 3.19 10*3/mm3      Lymphocytes, Absolute 1.13 10*3/mm3      Monocytes, Absolute 0.55 10*3/mm3      Eosinophils, Absolute 0.61 10*3/mm3      Basophils, Absolute 0.02 10*3/mm3      Immature Grans, Absolute 0.23 (H) 10*3/mm3     Basic Metabolic Panel [[id number]]  (Abnormal) Collected:  02/16/18 0448    Specimen:  Blood Updated:  02/16/18 0600     Glucose 111 (H) mg/dL      BUN 22 (H) mg/dL       Creatinine 0.95 mg/dL      Sodium 140 mmol/L      Potassium 4.3 mmol/L      Chloride 115 (H) mmol/L      CO2 17.4 (L) mmol/L      Calcium 8.3 mg/dL      eGFR Non African Amer 59 (L) mL/min/1.73      BUN/Creatinine Ratio 23.2     Anion Gap 7.6 mmol/L     Narrative:       GFR Normal >60  Chronic Kidney Disease <60  Kidney Failure <15    Osmolality, Calculated [[id number]]  (Normal) Collected:  02/16/18 0448    Specimen:  Blood Updated:  02/16/18 0600     Osmolality Calc 283.4 mOsm/kg     POC Glucose Once [[id number]]  (Normal) Collected:  02/16/18 0631    Specimen:  Blood Updated:  02/16/18 0656     Glucose 110 mg/dL     Narrative:       Meter: [id number] : [id number] Dolly Pena        Imaging Results (last 24 hours)     ** No results found for the last 24 hours. **        Orders (last 24 hrs)     Start     Ordered    02/17/18 0000  polyethylene glycol (MIRALAX) pack packet  Daily      02/16/18 1104    02/17/18 0000  ertapenem (INVanz) 1 g in 0.9% sodium chloride IVPB  Every 24 Hours      02/16/18 1104    02/16/18 1104  Discontinue IV  Once      02/16/18 1104    02/16/18 1102  Discharge patient  Once     Comments:  Patient needs ambulance transport    02/16/18 1104    02/16/18 0657  POC Glucose Once  Once      02/16/18 0656    02/16/18 0601  Osmolality, Calculated  Once      02/16/18 0600    02/16/18 0600  CBC & Differential  Morning Draw      02/15/18 1618    02/16/18 0600  Basic Metabolic Panel  Morning Draw      02/15/18 1618    02/16/18 0600  CBC Auto Differential  PROCEDURE ONCE      02/16/18 0002    02/16/18 0000  ondansetron ODT (ZOFRAN ODT) 4 MG disintegrating tablet  Every 8 Hours PRN      02/16/18 1104    02/16/18 0000  Discharge Follow-up with PCP      02/16/18 1104    02/16/18 0000  Activity as Tolerated      02/16/18 1104    02/16/18 0000  Diet: Soft Texture, Cardiac; Thin Liquids, No Restrictions; Chopped      02/16/18 1104    02/15/18 2100  lactulose (CHRONULAC) 10 GM/15ML solution 20 g  2 Times  Daily      02/15/18 1618    02/15/18 2009  POC Glucose Once  Once      02/15/18 2008    02/15/18 1800  polyethylene glycol 3350 powder (packet)  Daily      02/15/18 1618    02/14/18 1200  meropenem (MERREM) 1 g/100 mL 0.9% NS VTB (mbp)  Every 12 Hours      02/14/18 0905    02/13/18 2100  insulin detemir (LEVEMIR) injection 15 Units  Nightly      02/13/18 0636    02/13/18 1600  ondansetron (ZOFRAN) tablet 4 mg  Every 6 Hours PRN      02/13/18 1543    02/13/18 1200  castor oil-balsam peru (VENELEX) ointment  2 Times Daily      02/13/18 1055    02/13/18 0900  atorvastatin (LIPITOR) tablet 10 mg  Daily      02/12/18 2332    02/13/18 0900  lactobacillus acidophilus (RISAQUAD) capsule 1 capsule  Daily      02/13/18 0636    02/13/18 0900  amLODIPine (NORVASC) tablet 10 mg  Daily      02/13/18 0636    02/13/18 0900  busPIRone (BUSPAR) tablet 10 mg  Every 12 Hours Scheduled      02/13/18 0636    02/13/18 0900  docusate sodium (COLACE) capsule 200 mg  Daily      02/13/18 0636    02/13/18 0900  ferrous sulfate tablet 325 mg  Daily      02/13/18 0636    02/13/18 0900  gabapentin (NEURONTIN) capsule 200 mg  2 Times Daily      02/13/18 0636    02/13/18 0900  hydrOXYzine (ATARAX) tablet 25 mg  2 Times Daily      02/13/18 0636    02/13/18 0900  pantoprazole (PROTONIX) EC tablet 40 mg  2 Times Daily      02/13/18 0636    02/13/18 0900  OXcarbazepine (TRILEPTAL) tablet 150 mg  Every 12 Hours Scheduled      02/13/18 0636    02/13/18 0900  multivitamin (DAILY DARRELL) tablet 1 tablet  Daily      02/13/18 0636    02/13/18 0900  megestrol (MEGACE) 40 MG/ML suspension 200 mg  2 Times Daily      02/13/18 0636    02/13/18 0900  lactulose (CHRONULAC) 10 GM/15ML solution 10 g  2 Times Daily,   Status:  Discontinued      02/13/18 0636    02/13/18 0800  carvedilol (COREG) tablet 25 mg  2 Times Daily With Meals      02/13/18 0636    02/13/18 0800  levothyroxine (SYNTHROID, LEVOTHROID) tablet 50 mcg  Every Early Morning      02/13/18 0636     02/13/18 0635  oxyCODONE (ROXICODONE) immediate release tablet 5 mg  Every 4 Hours PRN      02/13/18 0636    02/13/18 0030  heparin (porcine) 5000 UNIT/ML injection 5,000 Units  Every 12 Hours Scheduled      02/12/18 2332 02/12/18 2332  sodium chloride 0.9 % flush 1-10 mL  As Needed      02/12/18 2332    02/12/18 2332  nitroglycerin (NITROSTAT) SL tablet 0.4 mg  Every 5 Minutes PRN      02/12/18 2332    Unscheduled  ECG 12 Lead  As Needed     Comments:  Nurse to Release if Patient Expericences Acute Chest Pain or Dysrhythmias    02/12/18 2332    Unscheduled  Potassium  As Needed     Comments:  For Ventricular Arrhythmias    02/12/18 2332    Unscheduled  Magnesium  As Needed     Comments:  For Ventricular Arrhythmias    02/12/18 2332    Unscheduled  Troponin  As Needed     Comments:  For Chest Pain    02/12/18 2332    Unscheduled  Digoxin Level  As Needed     Comments:  For Atrial Arrhythmias    02/12/18 2332    Unscheduled  Blood Gas, Arterial  As Needed     Comments:  Per O2 Policy  Notify Physician    02/12/18 2332    --  amLODIPine (NORVASC) 10 MG tablet  Daily      02/13/18 0237    --  levothyroxine (SYNTHROID, LEVOTHROID) 50 MCG tablet  Daily      02/13/18 0237    --  linaclotide (LINZESS) 145 MCG capsule capsule  Daily      02/13/18 0237    --  multivitamin (DAILY DARRELL) tablet tablet  Daily      02/13/18 0237    --  acidophilus (FLORANEX) tablet tablet  2 Times Daily      02/13/18 0237    --  gabapentin (NEURONTIN) 400 MG capsule  2 Times Daily      02/13/18 0237    --  megestrol (MEGACE) 40 MG/ML suspension  2 Times Daily      02/13/18 0237    --  pantoprazole (PROTONIX) 40 MG EC tablet  2 Times Daily      02/13/18 0237    --  oxyCODONE (ROXICODONE) 10 MG tablet  Every 4 Hours PRN      02/13/18 0237    --  hydrOXYzine (VISTARIL) 25 MG capsule  2 Times Daily      02/13/18 0237    --  DORIPENEM IV  Every 8 Hours      02/13/18 0237          Operative/Procedure Notes (most recent note)     No notes of this  "type exist for this encounter.           Physician Progress Notes (most recent note)      Leah Cedillo MD at 2/16/2018  8:59 AM  Version 2 of 2           I have personally seen and examined the patient today and discussed overnight interval progress and pertinent issues with nursing staff.    Subjective:    Overall patient is doing great.  Normal white count and no fever.    History taken from: patient chart RN      Vital Signs    /60 (BP Location: Right arm, Patient Position: Lying)  Pulse 66  Temp 98.8 °F (37.1 °C) (Oral)   Resp 20  Ht 167.6 cm (66\")  Wt 59.6 kg (131 lb 6 oz)  SpO2 97%  BMI 21.2 kg/m2    Temp:  [98 °F (36.7 °C)-99.1 °F (37.3 °C)] 98.8 °F (37.1 °C)      Intake/Output Summary (Last 24 hours) at 02/16/18 0859  Last data filed at 02/16/18 0407   Gross per 24 hour   Intake             1200 ml   Output                0 ml   Net             1200 ml     Intake & Output (last 3 days)       02/13 0701 - 02/14 0700 02/14 0701 - 02/15 0700 02/15 0701 - 02/16 0700 02/16 0701 - 02/17 0700    P.O.      IV Piggyback        Total Intake(mL/kg) 450 (7.6) 660 (11.1) 1440 (24.2)     Net +450 +660 +1440              Unmeasured Urine Occurrence 9 x 9 x 11 x     Unmeasured Stool Occurrence 1 x 3 x 3 x           Physical Exam:       General Appearance:    Alert, cooperative, in no acute distress   Head:    Normocephalic, without obvious abnormality, atraumatic   Eyes:            Lids and lashes normal, conjunctivae and sclerae normal, no   icterus, no pallor, corneas clear, PERRLA   Ears:    Ears appear intact with no abnormalities noted   Throat:   No oral lesions, no thrush, oral mucosa moist   Neck:   No adenopathy, supple, trachea midline, no thyromegaly, no   carotid bruit, no JVD   Back:     No tenderness to percussion or palpation, range of motion   normal   Lungs:     Clear to auscultation,respirations regular, even and unlabored. No wheezing, no ronchi and no crackles.    Heart:   "  Regular rhythm and normal rate, normal S1 and S2, no            murmur, no gallop, no rub, no click   Chest Wall:    No abnormalities observed   Abdomen:   Mild side pain    Rectal:     Deferred   Extremities:   Moves all extremities well, no edema, no cyanosis, no             redness   Pulses:   Pulses palpable and equal bilaterally   Skin:   No bleeding, bruising or rash   Lymph nodes:   No palpable adenopathy   Neurologic:   Awake, alert and oriented x 3. Following commands.         Results:      Results from last 7 days  Lab Units 02/16/18  0447 02/15/18  0103 02/14/18  0403 02/13/18  0804 02/12/18 2026 02/10/18  1737   WBC 10*3/mm3 5.73 4.86 5.39 4.14* 4.60 7.33     Lab Results   Component Value Date    NEUTROABS 3.19 02/16/2018         Results from last 7 days  Lab Units 02/16/18  0448   CREATININE mg/dL 0.95         Results from last 7 days  Lab Units 02/14/18  0403 02/13/18  0804   CRP mg/dL 1.40* 2.84*       Results Review:    I have personally reviewed laboratory data, culture results, radiology studies and antimicrobial therapy.    Hospital Medications (active)       Dose Frequency Start End    amLODIPine (NORVASC) tablet 10 mg 10 mg Daily 2/13/2018     Sig - Route: Take 1 tablet by mouth Daily. - Oral    atorvastatin (LIPITOR) tablet 10 mg 10 mg Daily 2/13/2018     Sig - Route: Take 1 tablet by mouth Daily. - Oral    busPIRone (BUSPAR) tablet 10 mg 10 mg Every 12 Hours Scheduled 2/13/2018     Sig - Route: Take 1 tablet by mouth Every 12 (Twelve) Hours. - Oral    carvedilol (COREG) tablet 25 mg 25 mg 2 Times Daily With Meals 2/13/2018     Sig - Route: Take 1 tablet by mouth 2 (Two) Times a Day With Meals. - Oral    castor oil-balsam peru (VENELEX) ointment  2 Times Daily 2/13/2018     Sig - Route: Apply  topically 2 (Two) Times a Day. - Topical    docusate sodium (COLACE) capsule 200 mg 200 mg Daily 2/13/2018     Sig - Route: Take 2 capsules by mouth Daily. - Oral    ferrous sulfate tablet 325 mg 325 mg  Daily 2/13/2018     Sig - Route: Take 1 tablet by mouth Daily. - Oral    gabapentin (NEURONTIN) capsule 200 mg 200 mg 2 Times Daily 2/13/2018     Sig - Route: Take 2 capsules by mouth 2 (Two) Times a Day. - Oral    heparin (porcine) 5000 UNIT/ML injection 5,000 Units 5,000 Units Every 12 Hours Scheduled 2/13/2018     Sig - Route: Inject 1 mL under the skin Every 12 (Twelve) Hours. - Subcutaneous    hydrOXYzine (ATARAX) tablet 25 mg 25 mg 2 Times Daily 2/13/2018     Sig - Route: Take 1 tablet by mouth 2 (Two) Times a Day. - Oral    insulin detemir (LEVEMIR) injection 15 Units 15 Units Nightly 2/13/2018     Sig - Route: Inject 15 Units under the skin Every Night. - Subcutaneous    lactobacillus acidophilus (RISAQUAD) capsule 1 capsule 1 capsule Daily 2/13/2018     Sig - Route: Take 1 capsule by mouth Daily. - Oral    lactulose (CHRONULAC) 10 GM/15ML solution 10 g 10 g 2 Times Daily 2/13/2018     Sig - Route: Take 15 mL by mouth 2 (Two) Times a Day. - Oral    levothyroxine (SYNTHROID, LEVOTHROID) tablet 50 mcg 50 mcg Every Early Morning 2/13/2018     Sig - Route: Take 1 tablet by mouth Every Morning. - Oral    megestrol (MEGACE) 40 MG/ML suspension 200 mg 200 mg 2 Times Daily 2/13/2018     Sig - Route: Take 5 mL by mouth 2 (Two) Times a Day. - Oral    meropenem (MERREM) 1 g/100 mL 0.9% NS VTB (mbp) 1 g Every 12 Hours 2/14/2018 2/20/2018    Sig - Route: Infuse 100 mL into a venous catheter Every 12 (Twelve) Hours. - Intravenous    multivitamin (DAILY DARRELL) tablet 1 tablet 1 tablet Daily 2/13/2018     Sig - Route: Take 1 tablet by mouth Daily. - Oral    nitroglycerin (NITROSTAT) SL tablet 0.4 mg 0.4 mg Every 5 Minutes PRN 2/12/2018     Sig - Route: Place 1 tablet under the tongue Every 5 (Five) Minutes As Needed for Chest Pain (if systolic BP greater than 100 mm/Hg.). - Sublingual    ondansetron (ZOFRAN) tablet 4 mg 4 mg Every 6 Hours PRN 2/13/2018     Sig - Route: Take 1 tablet by mouth Every 6 (Six) Hours As Needed for  Nausea or Vomiting. - Oral    OXcarbazepine (TRILEPTAL) tablet 150 mg 150 mg Every 12 Hours Scheduled 2/13/2018     Sig - Route: Take 0.5 tablets by mouth Every 12 (Twelve) Hours. - Oral    oxyCODONE (ROXICODONE) immediate release tablet 5 mg 5 mg Every 4 Hours PRN 2/13/2018     Sig - Route: Take 1 tablet by mouth Every 4 (Four) Hours As Needed for Moderate Pain . - Oral    pantoprazole (PROTONIX) EC tablet 40 mg 40 mg 2 Times Daily 2/13/2018     Sig - Route: Take 1 tablet by mouth 2 (Two) Times a Day. - Oral    sodium chloride 0.9 % flush 1-10 mL 1-10 mL As Needed 2/12/2018     Sig - Route: Infuse 1-10 mL into a venous catheter As Needed for Line Care. - Intravenous    meropenem (MERREM) 500mg/100 mL 0.9% NS IVPB (mbp) (Discontinued) 500 mg Every 8 Hours 2/13/2018 2/14/2018    Sig - Route: Infuse 100 mL into a venous catheter Every 8 (Eight) Hours. - Intravenous            Cultures:    Blood Culture   Date Value Ref Range Status   02/12/2018 No growth at 24 hours  Preliminary   02/12/2018 No growth at 24 hours  Preliminary   02/10/2018 Escherichia coli (A)  Final   02/10/2018 Escherichia coli (A)  Final     Urine Culture   Date Value Ref Range Status   02/12/2018 (A)  Preliminary    >100,000 CFU/mL Gram Negative Bacilli, Morphology consistent with Escherichia / Citrobacter   02/10/2018 >100,000 CFU/mL Escherichia coli ESBL (C)  Preliminary     Comment:       Consider infectious disease consult.  Susceptibility results may not correlate to clinical outcomes.   02/10/2018 (A)  Preliminary    50,000-60,000 CFU/mL Gram Positive Cocci, Morphology consistent with Group D Enterococcus           Assessment/Plan     ASSESSMENT:    1. Bacteremia  2.  Acute pyelonephritis     PLAN:    Overall patient is doing great.  Normal white count and no fever.      Blood cultures from 2/10/2018 finalized as non-ESBL Escherichia coli but urine culture from 2/10/2018 so far showing ESBL Escherichia coli and enterococcus.  Enterococcus is  "most likely colonization rather than true active infection. Non-ESBL Escherichia coli is most likely the culprit but urine most likely has several strains of Escherichia coli with predominant growth of ESBL in the urine culture.  Overall fairly complicated case and would not feel comfortable not covering ESBL.     Recommend to complete meropenem or de-escalate to ertapenem for easier outpatient therapy and would recommend to continue course of meropenem through 2/20/2018.    ID will sign off.  Please let us know if we can further assist in this case thank you!     Patient's findings and recommendations were discussed with patient and nursing staff    Code Status: Conditional Code     Scribed for ANICETO Victoria  02/16/18  8:59 AM      Physician Attestation:    The documentation recorded by the scribe accurately reflects the service I personally performed and the decisions made by me.    Leah Cedillo MD  Infectious Diseases  02/16/18  9:07 AM       Electronically signed by Leah Cedillo MD at 2/16/2018  9:07 AM      ANICETO Pool at 2/16/2018  8:59 AM  Version 1 of 2           I have personally seen and examined the patient today and discussed overnight interval progress and pertinent issues with nursing staff.    Subjective:    Overall patient is doing great.  Normal white count and no fever.    History taken from: patient chart RN      Vital Signs    /60 (BP Location: Right arm, Patient Position: Lying)  Pulse 66  Temp 98.8 °F (37.1 °C) (Oral)   Resp 20  Ht 167.6 cm (66\")  Wt 59.6 kg (131 lb 6 oz)  SpO2 97%  BMI 21.2 kg/m2    Temp:  [98 °F (36.7 °C)-99.1 °F (37.3 °C)] 98.8 °F (37.1 °C)      Intake/Output Summary (Last 24 hours) at 02/16/18 0859  Last data filed at 02/16/18 0407   Gross per 24 hour   Intake             1200 ml   Output                0 ml   Net             1200 ml     Intake & Output (last 3 days)       02/13 0701 - 02/14 0700 02/14 0701 - 02/15 " 0700 02/15 0701 - 02/16 0700 02/16 0701 - 02/17 0700    P.O.      IV Piggyback        Total Intake(mL/kg) 450 (7.6) 660 (11.1) 1440 (24.2)     Net +450 +660 +1440              Unmeasured Urine Occurrence 9 x 9 x 11 x     Unmeasured Stool Occurrence 1 x 3 x 3 x           Physical Exam:       General Appearance:    Alert, cooperative, in no acute distress   Head:    Normocephalic, without obvious abnormality, atraumatic   Eyes:            Lids and lashes normal, conjunctivae and sclerae normal, no   icterus, no pallor, corneas clear, PERRLA   Ears:    Ears appear intact with no abnormalities noted   Throat:   No oral lesions, no thrush, oral mucosa moist   Neck:   No adenopathy, supple, trachea midline, no thyromegaly, no   carotid bruit, no JVD   Back:     No tenderness to percussion or palpation, range of motion   normal   Lungs:     Clear to auscultation,respirations regular, even and unlabored. No wheezing, no ronchi and no crackles.    Heart:    Regular rhythm and normal rate, normal S1 and S2, no            murmur, no gallop, no rub, no click   Chest Wall:    No abnormalities observed   Abdomen:   Mild side pain    Rectal:     Deferred   Extremities:   Moves all extremities well, no edema, no cyanosis, no             redness   Pulses:   Pulses palpable and equal bilaterally   Skin:   No bleeding, bruising or rash   Lymph nodes:   No palpable adenopathy   Neurologic:   Awake, alert and oriented x 3. Following commands.         Results:      Results from last 7 days  Lab Units 02/16/18  0447 02/15/18  0103 02/14/18  0403 02/13/18  0804 02/12/18  2026 02/10/18  1737   WBC 10*3/mm3 5.73 4.86 5.39 4.14* 4.60 7.33     Lab Results   Component Value Date    NEUTROABS 3.19 02/16/2018         Results from last 7 days  Lab Units 02/16/18  0448   CREATININE mg/dL 0.95         Results from last 7 days  Lab Units 02/14/18  0403 02/13/18  0804   CRP mg/dL 1.40* 2.84*       Results Review:    I have personally  reviewed laboratory data, culture results, radiology studies and antimicrobial therapy.    Hospital Medications (active)       Dose Frequency Start End    amLODIPine (NORVASC) tablet 10 mg 10 mg Daily 2/13/2018     Sig - Route: Take 1 tablet by mouth Daily. - Oral    atorvastatin (LIPITOR) tablet 10 mg 10 mg Daily 2/13/2018     Sig - Route: Take 1 tablet by mouth Daily. - Oral    busPIRone (BUSPAR) tablet 10 mg 10 mg Every 12 Hours Scheduled 2/13/2018     Sig - Route: Take 1 tablet by mouth Every 12 (Twelve) Hours. - Oral    carvedilol (COREG) tablet 25 mg 25 mg 2 Times Daily With Meals 2/13/2018     Sig - Route: Take 1 tablet by mouth 2 (Two) Times a Day With Meals. - Oral    castor oil-balsam peru (VENELEX) ointment  2 Times Daily 2/13/2018     Sig - Route: Apply  topically 2 (Two) Times a Day. - Topical    docusate sodium (COLACE) capsule 200 mg 200 mg Daily 2/13/2018     Sig - Route: Take 2 capsules by mouth Daily. - Oral    ferrous sulfate tablet 325 mg 325 mg Daily 2/13/2018     Sig - Route: Take 1 tablet by mouth Daily. - Oral    gabapentin (NEURONTIN) capsule 200 mg 200 mg 2 Times Daily 2/13/2018     Sig - Route: Take 2 capsules by mouth 2 (Two) Times a Day. - Oral    heparin (porcine) 5000 UNIT/ML injection 5,000 Units 5,000 Units Every 12 Hours Scheduled 2/13/2018     Sig - Route: Inject 1 mL under the skin Every 12 (Twelve) Hours. - Subcutaneous    hydrOXYzine (ATARAX) tablet 25 mg 25 mg 2 Times Daily 2/13/2018     Sig - Route: Take 1 tablet by mouth 2 (Two) Times a Day. - Oral    insulin detemir (LEVEMIR) injection 15 Units 15 Units Nightly 2/13/2018     Sig - Route: Inject 15 Units under the skin Every Night. - Subcutaneous    lactobacillus acidophilus (RISAQUAD) capsule 1 capsule 1 capsule Daily 2/13/2018     Sig - Route: Take 1 capsule by mouth Daily. - Oral    lactulose (CHRONULAC) 10 GM/15ML solution 10 g 10 g 2 Times Daily 2/13/2018     Sig - Route: Take 15 mL by mouth 2 (Two) Times a Day. - Oral     levothyroxine (SYNTHROID, LEVOTHROID) tablet 50 mcg 50 mcg Every Early Morning 2/13/2018     Sig - Route: Take 1 tablet by mouth Every Morning. - Oral    megestrol (MEGACE) 40 MG/ML suspension 200 mg 200 mg 2 Times Daily 2/13/2018     Sig - Route: Take 5 mL by mouth 2 (Two) Times a Day. - Oral    meropenem (MERREM) 1 g/100 mL 0.9% NS VTB (mbp) 1 g Every 12 Hours 2/14/2018 2/20/2018    Sig - Route: Infuse 100 mL into a venous catheter Every 12 (Twelve) Hours. - Intravenous    multivitamin (DAILY DARRELL) tablet 1 tablet 1 tablet Daily 2/13/2018     Sig - Route: Take 1 tablet by mouth Daily. - Oral    nitroglycerin (NITROSTAT) SL tablet 0.4 mg 0.4 mg Every 5 Minutes PRN 2/12/2018     Sig - Route: Place 1 tablet under the tongue Every 5 (Five) Minutes As Needed for Chest Pain (if systolic BP greater than 100 mm/Hg.). - Sublingual    ondansetron (ZOFRAN) tablet 4 mg 4 mg Every 6 Hours PRN 2/13/2018     Sig - Route: Take 1 tablet by mouth Every 6 (Six) Hours As Needed for Nausea or Vomiting. - Oral    OXcarbazepine (TRILEPTAL) tablet 150 mg 150 mg Every 12 Hours Scheduled 2/13/2018     Sig - Route: Take 0.5 tablets by mouth Every 12 (Twelve) Hours. - Oral    oxyCODONE (ROXICODONE) immediate release tablet 5 mg 5 mg Every 4 Hours PRN 2/13/2018     Sig - Route: Take 1 tablet by mouth Every 4 (Four) Hours As Needed for Moderate Pain . - Oral    pantoprazole (PROTONIX) EC tablet 40 mg 40 mg 2 Times Daily 2/13/2018     Sig - Route: Take 1 tablet by mouth 2 (Two) Times a Day. - Oral    sodium chloride 0.9 % flush 1-10 mL 1-10 mL As Needed 2/12/2018     Sig - Route: Infuse 1-10 mL into a venous catheter As Needed for Line Care. - Intravenous    meropenem (MERREM) 500mg/100 mL 0.9% NS IVPB (mbp) (Discontinued) 500 mg Every 8 Hours 2/13/2018 2/14/2018    Sig - Route: Infuse 100 mL into a venous catheter Every 8 (Eight) Hours. - Intravenous            Cultures:    Blood Culture   Date Value Ref Range Status   02/12/2018 No growth  at 24 hours  Preliminary   02/12/2018 No growth at 24 hours  Preliminary   02/10/2018 Escherichia coli (A)  Final   02/10/2018 Escherichia coli (A)  Final     Urine Culture   Date Value Ref Range Status   02/12/2018 (A)  Preliminary    >100,000 CFU/mL Gram Negative Bacilli, Morphology consistent with Escherichia / Citrobacter   02/10/2018 >100,000 CFU/mL Escherichia coli ESBL (C)  Preliminary     Comment:       Consider infectious disease consult.  Susceptibility results may not correlate to clinical outcomes.   02/10/2018 (A)  Preliminary    50,000-60,000 CFU/mL Gram Positive Cocci, Morphology consistent with Group D Enterococcus           Assessment/Plan     ASSESSMENT:    1. Bacteremia  2.  Acute pyelonephritis     PLAN:    Overall patient is doing great.  Normal white count and no fever.      Blood cultures from 2/10/2018 finalized as non-ESBL Escherichia coli but urine culture from 2/10/2018 so far showing ESBL Escherichia coli and enterococcus.  Enterococcus is most likely colonization rather than true active infection. Non-ESBL Escherichia coli is most likely the culprit but urine most likely has several strains of Escherichia coli with predominant growth of ESBL in the urine culture.  Overall fairly complicated case and would not feel comfortable not covering ESBL.     Recommend to complete meropenem or de-escalate to ertapenem for easier outpatient therapy and would recommend to continue course of meropenem through 2/20/2018.    ID will sign off.  Please let us know if we can further assist in this case thank you!     Patient's findings and recommendations were discussed with patient and nursing staff    Code Status: Conditional Code     Scribed for ANICETO Victoria  02/16/18  8:59 AM           Electronically signed by ANICETO Pool at 2/16/2018  9:02 AM           Consult Notes (most recent note)      Leah Cedillo MD at 2/13/2018 10:37 AM  Version 2 of 2     Consult  Orders:    1. Inpatient Consult to Infectious Diseases [[id number]] ordered by Jamel Stovall MD at 02/13/18 0607                  INFECTIOUS DISEASE CONSULTATION REPORT      Referring Provider: Dr. Stovall  Reason for Consultation: Bacteremia      Principal problem: <principal problem not specified>    Subjective .     History of present illness:    As you well know Dr. Stovall Ms. Lily Ace is a 62 y.o. years old female nursing home resident at [place] with past medical history significant for CAD, COPD, diabetes, end-stage liver disease, history of ESBL Escherichia coli of the urine, hepatitis, hypertension, hypothyroidism, history of Pseudomonas in the urine , who presented to [facility] Emergency Department on 2/12/2018 for admission due to positive blood cultures obtained on 2/10/18.  Her visit to the ER on 2/10/18 was concerning for UTI as well as patient received Rocephin and sent back to the nursing home where she followed up at the nursing home with Zosyn.    Infectious Disease consultation was requested for antimicrobial management.     History taken from: patient chart RN    Case was discussed with patient, nursing staff, primary care team and consulting provider    Review of Systems     Constitutional: States she feels bad all over.  Decreased appetite and mild side pain.    Eyes: no eye drainage, itching or redness.  HEENT: no mouth sores, dysphagia or nose bleed.  Respiratory: no for shortness of breath, cough or production of sputum.  Cardiovascular: no chest pain, no palpitations, no orthopnea.  Gastrointestinal: no nausea, vomiting or diarrhea. No abdominal pain, hematemesis or rectal bleeding.  Genitourinary: See history of present illness Hematologic/lymphatic: no lymph node abnormalities, no easy bruising or easy bleeding.  Musculoskeletal: no muscle or joint pain.  Skin: No rash and no itching.  Neurological: no loss of consciousness, no seizure, no  "headache.  Behavioral/Psych: no depression or suicidal ideation.  Endocrine: no hot flashes.  Immunologic: negative.    Past Medical History    Past Medical History:   Diagnosis Date   • Anterolisthesis     C5-C6 with perched facet   • C5 vertebral fracture    • CAD (coronary artery disease)    • Cardiac disorder    • Cirrhosis    • Constipation    • COPD (chronic obstructive pulmonary disease)    • Diabetes mellitus    • End stage liver disease    • Functional quadriplegia    • Glaucoma    • History of ESBL E. coli infection 12/2017    Urine   • Hx of hepatitis C    • Hypertension    • Hypothyroidism    • MRSA cellulitis 12/2017    Leg   • Pseudomonas urinary tract infection 12/2017   • Urinary tract infection        Past Surgical History    Past Surgical History:   Procedure Laterality Date   • ABDOMINAL WALL MESH  REMOVAL     • HERNIA REPAIR     • HYSTERECTOMY     • KIDNEY SURGERY         Family History    Family History   Problem Relation Age of Onset   • Heart disease Other    • Diabetes Other    • Hypertension Other    • Cancer Other        Social History    Social History   Substance Use Topics   • Smoking status: Current Every Day Smoker     Types: Cigarettes   • Smokeless tobacco: Never Used   • Alcohol use No       Allergies    Review of patient's allergies indicates no known allergies.    Objective     /62 (BP Location: Left arm, Patient Position: Lying)  Pulse 82  Temp 98.2 °F (36.8 °C) (Oral)   Resp 20  Ht 167.6 cm (66\")  Wt 59.6 kg (131 lb 6 oz)  SpO2 94%  BMI 21.2 kg/m2    Temp:  [98.1 °F (36.7 °C)-99.3 °F (37.4 °C)] 98.2 °F (36.8 °C)        Intake/Output Summary (Last 24 hours) at 02/13/18 1037  Last data filed at 02/13/18 0300   Gross per 24 hour   Intake              350 ml   Output                0 ml   Net              350 ml         Physical Exam:      General Appearance:    Alert, cooperative, in no acute distress   Head:    Normocephalic, without obvious abnormality, atraumatic "   Eyes:            Lids and lashes normal, conjunctivae and sclerae normal, no   icterus, no pallor, corneas clear, PERRLA   Ears:    Ears appear intact with no abnormalities noted   Throat:   No oral lesions, no thrush, oral mucosa moist   Neck:   No adenopathy, supple, trachea midline, no thyromegaly, no   carotid bruit, no JVD   Back:     No tenderness to percussion or palpation, range of motion   normal   Lungs:     Clear to auscultation,respirations regular, even and unlabored. No wheezing, no ronchi and no crackles.    Heart:    Regular rhythm and normal rate, normal S1 and S2, no            murmur, no gallop, no rub, no click   Chest Wall:    No abnormalities observed   Abdomen:   Mild side pain    Rectal:     Deferred   Extremities:   Moves all extremities well, no edema, no cyanosis, no             redness   Pulses:   Pulses palpable and equal bilaterally   Skin:   No bleeding, bruising or rash   Lymph nodes:   No palpable adenopathy   Neurologic:   Awake, alert and oriented x 3. Following commands.       Results:      Results from last 7 days  Lab Units 02/13/18  0804 02/12/18  2026 02/10/18  1737   WBC 10*3/mm3 4.14* 4.60 7.33     Lab Results   Component Value Date    NEUTROABS 2.15 02/13/2018         Results from last 7 days  Lab Units 02/13/18  0804   CREATININE mg/dL 1.48*         Results from last 7 days  Lab Units 02/13/18  0804   CRP mg/dL 2.84*       Imaging Results (last 24 hours)     Procedure Component Value Units Date/Time    XR Chest 1 View [[id number]] Collected:  02/13/18 0723     Updated:  02/13/18 0826    Narrative:       XR CHEST 1 VIEW-     CLINICAL INDICATION: soa          COMPARISON: 02/10/2018      TECHNIQUE: Single frontal view of the chest.     FINDINGS:     There is no focal alveolar infiltrate or effusion.  The cardiac silhouette is normal. The pulmonary vasculature is  unremarkable.  There is no evidence of an acute osseous abnormality.   There are no suspicious-appearing  parenchymal soft tissue nodules.            Impression:       No evidence of active or acute cardiopulmonary disease on today's chest  radiograph.         This report was finalized on 2/13/2018 8:24 AM by Dr. Jose Martinez MD.               Cultures:    Blood Culture   Date Value Ref Range Status   02/12/2018 No growth at less than 24 hours  Preliminary   02/12/2018 No growth at less than 24 hours  Preliminary   02/10/2018 (A)  Preliminary    Gram Negative Bacilli, Morphology consistent with Escherichia / Citrobacter   02/10/2018 (A)  Preliminary    Gram Negative Bacilli, Morphology consistent with Escherichia / Citrobacter     Urine Culture   Date Value Ref Range Status   02/10/2018 (A)  Preliminary    >100,000 CFU/mL Gram Negative Bacilli, Morphology consistent with Escherichia / Citrobacter       Results Review:    I have personally reviewed laboratory data, culture results, radiology studies and antimicrobial therapy.    Hospital Medications (active)       Dose Frequency Start End    amLODIPine (NORVASC) tablet 10 mg 10 mg Daily 2/13/2018     Sig - Route: Take 1 tablet by mouth Daily. - Oral    atorvastatin (LIPITOR) tablet 10 mg 10 mg Daily 2/13/2018     Sig - Route: Take 1 tablet by mouth Daily. - Oral    busPIRone (BUSPAR) tablet 10 mg 10 mg Every 12 Hours Scheduled 2/13/2018     Sig - Route: Take 1 tablet by mouth Every 12 (Twelve) Hours. - Oral    carvedilol (COREG) tablet 25 mg 25 mg 2 Times Daily With Meals 2/13/2018     Sig - Route: Take 1 tablet by mouth 2 (Two) Times a Day With Meals. - Oral    docusate sodium (COLACE) capsule 200 mg 200 mg Daily 2/13/2018     Sig - Route: Take 2 capsules by mouth Daily. - Oral    ferrous sulfate tablet 325 mg 325 mg Daily 2/13/2018     Sig - Route: Take 1 tablet by mouth Daily. - Oral    gabapentin (NEURONTIN) capsule 200 mg 200 mg 2 Times Daily 2/13/2018     Sig - Route: Take 2 capsules by mouth 2 (Two) Times a Day. - Oral    heparin (porcine) 5000 UNIT/ML injection  5,000 Units 5,000 Units Every 12 Hours Scheduled 2/13/2018     Sig - Route: Inject 1 mL under the skin Every 12 (Twelve) Hours. - Subcutaneous    hydrOXYzine (ATARAX) tablet 25 mg 25 mg 2 Times Daily 2/13/2018     Sig - Route: Take 1 tablet by mouth 2 (Two) Times a Day. - Oral    insulin detemir (LEVEMIR) injection 15 Units 15 Units Nightly 2/13/2018     Sig - Route: Inject 15 Units under the skin Every Night. - Subcutaneous    lactobacillus acidophilus (RISAQUAD) capsule 1 capsule 1 capsule Daily 2/13/2018     Sig - Route: Take 1 capsule by mouth Daily. - Oral    lactulose (CHRONULAC) 10 GM/15ML solution 10 g 10 g 2 Times Daily 2/13/2018     Sig - Route: Take 15 mL by mouth 2 (Two) Times a Day. - Oral    levothyroxine (SYNTHROID, LEVOTHROID) tablet 50 mcg 50 mcg Every Early Morning 2/13/2018     Sig - Route: Take 1 tablet by mouth Every Morning. - Oral    megestrol (MEGACE) 40 MG/ML suspension 200 mg 200 mg 2 Times Daily 2/13/2018     Sig - Route: Take 5 mL by mouth 2 (Two) Times a Day. - Oral    meropenem (MERREM) 1 g/100 mL 0.9% NS VTB (mbp) 1 g Once 2/13/2018 2/13/2018    Sig - Route: Infuse 100 mL into a venous catheter 1 (One) Time. - Intravenous    meropenem (MERREM) 500mg/100 mL 0.9% NS IVPB (mbp) 500 mg Every 8 Hours 2/13/2018 2/20/2018    Sig - Route: Infuse 100 mL into a venous catheter Every 8 (Eight) Hours. - Intravenous    multivitamin (DAILY DARRELL) tablet 1 tablet 1 tablet Daily 2/13/2018     Sig - Route: Take 1 tablet by mouth Daily. - Oral    nitroglycerin (NITROSTAT) SL tablet 0.4 mg 0.4 mg Every 5 Minutes PRN 2/12/2018     Sig - Route: Place 1 tablet under the tongue Every 5 (Five) Minutes As Needed for Chest Pain (if systolic BP greater than 100 mm/Hg.). - Sublingual    OXcarbazepine (TRILEPTAL) tablet 150 mg 150 mg Every 12 Hours Scheduled 2/13/2018     Sig - Route: Take 0.5 tablets by mouth Every 12 (Twelve) Hours. - Oral    oxyCODONE (ROXICODONE) immediate release tablet 5 mg 5 mg Every 4 Hours  PRN 2/13/2018     Sig - Route: Take 1 tablet by mouth Every 4 (Four) Hours As Needed for Moderate Pain . - Oral    pantoprazole (PROTONIX) EC tablet 40 mg 40 mg 2 Times Daily 2/13/2018     Sig - Route: Take 1 tablet by mouth 2 (Two) Times a Day. - Oral    piperacillin-tazobactam (ZOSYN) 4.5 g/100 mL 0.9% NS IVPB (mbp) 4.5 g Once 2/12/2018 2/12/2018    Sig - Route: Infuse 100 mL into a venous catheter 1 (One) Time. - Intravenous    sodium chloride 0.9 % bolus 500 mL 500 mL Once 2/12/2018 2/12/2018    Sig - Route: Infuse 500 mL into a venous catheter 1 (One) Time. - Intravenous    sodium chloride 0.9 % flush 1-10 mL 1-10 mL As Needed 2/12/2018     Sig - Route: Infuse 1-10 mL into a venous catheter As Needed for Line Care. - Intravenous    vancomycin (VANCOCIN) 1,250 mg in sodium chloride 0.9 % 250 mL IVPB 20 mg/kg × 59 kg Once 2/12/2018 2/13/2018    Sig - Route: Infuse 1,250 mg into a venous catheter 1 (One) Time. - Intravenous    aspirin EC tablet 81 mg (Discontinued) 81 mg Daily 2/13/2018 2/13/2018    Sig - Route: Take 1 tablet by mouth Daily. - Oral    Reason for Discontinue: Discontinued by another clinician    cefTRIAXone (ROCEPHIN) 1 g/100 mL 0.9% NS (MBP) (Discontinued) 1 g Once 2/12/2018 2/12/2018    Sig - Route: Infuse 100 mL into a venous catheter 1 (One) Time. - Intravenous    diltiaZEM CD (CARDIZEM CD) 24 hr capsule 180 mg (Discontinued) 180 mg Every 24 Hours Scheduled 2/13/2018 2/13/2018    Sig - Route: Take 1 capsule by mouth Daily. - Oral    Reason for Discontinue: Discontinued by another clinician    meropenem (MERREM) 1 g/100 mL 0.9% NS VTB (mbp) (Discontinued) 1 g Every 8 Hours 2/13/2018 2/13/2018    Sig - Route: Infuse 100 mL into a venous catheter Every 8 (Eight) Hours. - Intravenous    Reason for Discontinue: Dose adjustment              Assessment/Plan     ASSESSMENT:    1. Bacteremia  2.  Acute pyelonephritis    PLAN:    Patient presented to [facility] on  2/12/2018 for admission due to positive blood cultures obtained on 2/10/18.  Her visit to the ER on 2/10/18 was concerning for UTI as well as patient received Rocephin and sent back to the nursing home where she followed up at the nursing home with Zosyn.    With history of prior ESBL infection, would recommend to continue meropenem monotherapy for now.    Hope to de-escalate antibiotic therapy when susceptibilities are available.    If urine culture shows no evidence of ESBL, would recommend to DC isolation after CHD bath.    We will continue to follow closely.    Patient's findings and recommendations were discussed with patient, nursing staff, primary care team and consulting provider    Code Status: Conditional Code     Scribed for ANICETO Victoria  02/13/18  10:37 AM     Physician Attestation:    The documentation recorded by the scribe accurately reflects the service I personally performed and the decisions made by me.    Leah Cedillo MD  Infectious Diseases  02/13/18  1:12 PM       Electronically signed by Leah Cedillo MD at 2/13/2018  1:12 PM      ANICETO Pool at 2/13/2018 10:37 AM  Version 1 of 2           INFECTIOUS DISEASE CONSULTATION REPORT      Referring Provider: Dr. Stovall  Reason for Consultation: Bacteremia      Principal problem: <principal problem not specified>    Subjective .     History of present illness:    As you well know Dr. Stovall Ms. Lily Ace is a 62 y.o. years old female nursing home resident at [facility] with past medical history significant for CAD, COPD, diabetes, end-stage liver disease, history of ESBL Escherichia coli of the urine, hepatitis, hypertension, hypothyroidism, history of Pseudomonas in the urine , who presented to [facility] on 2/12/2018 for admission due to positive blood cultures obtained on 2/10/18.  Her visit to the ER on 2/10/18 was concerning for UTI as well as patient received  Rocephin and sent back to the nursing home where she followed up at the nursing home with Zosyn.    Infectious Disease consultation was requested for antimicrobial management.     History taken from: patient chart RN    Case was discussed with patient, nursing staff, primary care team and consulting provider    Review of Systems     Constitutional: States she feels bad all over.  Decreased appetite and mild side pain.    Eyes: no eye drainage, itching or redness.  HEENT: no mouth sores, dysphagia or nose bleed.  Respiratory: no for shortness of breath, cough or production of sputum.  Cardiovascular: no chest pain, no palpitations, no orthopnea.  Gastrointestinal: no nausea, vomiting or diarrhea. No abdominal pain, hematemesis or rectal bleeding.  Genitourinary: See history of present illness Hematologic/lymphatic: no lymph node abnormalities, no easy bruising or easy bleeding.  Musculoskeletal: no muscle or joint pain.  Skin: No rash and no itching.  Neurological: no loss of consciousness, no seizure, no headache.  Behavioral/Psych: no depression or suicidal ideation.  Endocrine: no hot flashes.  Immunologic: negative.    Past Medical History    Past Medical History:   Diagnosis Date   • Anterolisthesis     C5-C6 with perched facet   • C5 vertebral fracture    • CAD (coronary artery disease)    • Cardiac disorder    • Cirrhosis    • Constipation    • COPD (chronic obstructive pulmonary disease)    • Diabetes mellitus    • End stage liver disease    • Functional quadriplegia    • Glaucoma    • History of ESBL E. coli infection 12/2017    Urine   • Hx of hepatitis C    • Hypertension    • Hypothyroidism    • MRSA cellulitis 12/2017    Leg   • Pseudomonas urinary tract infection 12/2017   • Urinary tract infection        Past Surgical History    Past Surgical History:   Procedure Laterality Date   • ABDOMINAL WALL MESH  REMOVAL     • HERNIA REPAIR     • HYSTERECTOMY     • KIDNEY SURGERY         Family History    Family  "History   Problem Relation Age of Onset   • Heart disease Other    • Diabetes Other    • Hypertension Other    • Cancer Other        Social History    Social History   Substance Use Topics   • Smoking status: Current Every Day Smoker     Types: Cigarettes   • Smokeless tobacco: Never Used   • Alcohol use No       Allergies    Review of patient's allergies indicates no known allergies.    Objective     /62 (BP Location: Left arm, Patient Position: Lying)  Pulse 82  Temp 98.2 °F (36.8 °C) (Oral)   Resp 20  Ht 167.6 cm (66\")  Wt 59.6 kg (131 lb 6 oz)  SpO2 94%  BMI 21.2 kg/m2    Temp:  [98.1 °F (36.7 °C)-99.3 °F (37.4 °C)] 98.2 °F (36.8 °C)        Intake/Output Summary (Last 24 hours) at 02/13/18 1037  Last data filed at 02/13/18 0300   Gross per 24 hour   Intake              350 ml   Output                0 ml   Net              350 ml         Physical Exam:      General Appearance:    Alert, cooperative, in no acute distress   Head:    Normocephalic, without obvious abnormality, atraumatic   Eyes:            Lids and lashes normal, conjunctivae and sclerae normal, no   icterus, no pallor, corneas clear, PERRLA   Ears:    Ears appear intact with no abnormalities noted   Throat:   No oral lesions, no thrush, oral mucosa moist   Neck:   No adenopathy, supple, trachea midline, no thyromegaly, no   carotid bruit, no JVD   Back:     No tenderness to percussion or palpation, range of motion   normal   Lungs:     Clear to auscultation,respirations regular, even and unlabored. No wheezing, no ronchi and no crackles.    Heart:    Regular rhythm and normal rate, normal S1 and S2, no            murmur, no gallop, no rub, no click   Chest Wall:    No abnormalities observed   Abdomen:   Mild side pain    Rectal:     Deferred   Extremities:   Moves all extremities well, no edema, no cyanosis, no             redness   Pulses:   Pulses palpable and equal bilaterally   Skin:   No bleeding, bruising or rash   Lymph nodes:  "  No palpable adenopathy   Neurologic:   Awake, alert and oriented x 3. Following commands.       Results:      Results from last 7 days  Lab Units 02/13/18  0804 02/12/18  2026 02/10/18  1737   WBC 10*3/mm3 4.14* 4.60 7.33     Lab Results   Component Value Date    NEUTROABS 2.15 02/13/2018         Results from last 7 days  Lab Units 02/13/18  0804   CREATININE mg/dL 1.48*         Results from last 7 days  Lab Units 02/13/18  0804   CRP mg/dL 2.84*       Imaging Results (last 24 hours)     Procedure Component Value Units Date/Time    XR Chest 1 View [[id number]] Collected:  02/13/18 0723     Updated:  02/13/18 0826    Narrative:       XR CHEST 1 VIEW-     CLINICAL INDICATION: soa          COMPARISON: 02/10/2018      TECHNIQUE: Single frontal view of the chest.     FINDINGS:     There is no focal alveolar infiltrate or effusion.  The cardiac silhouette is normal. The pulmonary vasculature is  unremarkable.  There is no evidence of an acute osseous abnormality.   There are no suspicious-appearing parenchymal soft tissue nodules.            Impression:       No evidence of active or acute cardiopulmonary disease on today's chest  radiograph.         This report was finalized on 2/13/2018 8:24 AM by Dr. Jose Martinez MD.               Cultures:    Blood Culture   Date Value Ref Range Status   02/12/2018 No growth at less than 24 hours  Preliminary   02/12/2018 No growth at less than 24 hours  Preliminary   02/10/2018 (A)  Preliminary    Gram Negative Bacilli, Morphology consistent with Escherichia / Citrobacter   02/10/2018 (A)  Preliminary    Gram Negative Bacilli, Morphology consistent with Escherichia / Citrobacter     Urine Culture   Date Value Ref Range Status   02/10/2018 (A)  Preliminary    >100,000 CFU/mL Gram Negative Bacilli, Morphology consistent with Escherichia / Citrobacter       Results Review:    I have personally reviewed laboratory data, culture results, radiology studies and antimicrobial  therapy.    Hospital Medications (active)       Dose Frequency Start End    amLODIPine (NORVASC) tablet 10 mg 10 mg Daily 2/13/2018     Sig - Route: Take 1 tablet by mouth Daily. - Oral    atorvastatin (LIPITOR) tablet 10 mg 10 mg Daily 2/13/2018     Sig - Route: Take 1 tablet by mouth Daily. - Oral    busPIRone (BUSPAR) tablet 10 mg 10 mg Every 12 Hours Scheduled 2/13/2018     Sig - Route: Take 1 tablet by mouth Every 12 (Twelve) Hours. - Oral    carvedilol (COREG) tablet 25 mg 25 mg 2 Times Daily With Meals 2/13/2018     Sig - Route: Take 1 tablet by mouth 2 (Two) Times a Day With Meals. - Oral    docusate sodium (COLACE) capsule 200 mg 200 mg Daily 2/13/2018     Sig - Route: Take 2 capsules by mouth Daily. - Oral    ferrous sulfate tablet 325 mg 325 mg Daily 2/13/2018     Sig - Route: Take 1 tablet by mouth Daily. - Oral    gabapentin (NEURONTIN) capsule 200 mg 200 mg 2 Times Daily 2/13/2018     Sig - Route: Take 2 capsules by mouth 2 (Two) Times a Day. - Oral    heparin (porcine) 5000 UNIT/ML injection 5,000 Units 5,000 Units Every 12 Hours Scheduled 2/13/2018     Sig - Route: Inject 1 mL under the skin Every 12 (Twelve) Hours. - Subcutaneous    hydrOXYzine (ATARAX) tablet 25 mg 25 mg 2 Times Daily 2/13/2018     Sig - Route: Take 1 tablet by mouth 2 (Two) Times a Day. - Oral    insulin detemir (LEVEMIR) injection 15 Units 15 Units Nightly 2/13/2018     Sig - Route: Inject 15 Units under the skin Every Night. - Subcutaneous    lactobacillus acidophilus (RISAQUAD) capsule 1 capsule 1 capsule Daily 2/13/2018     Sig - Route: Take 1 capsule by mouth Daily. - Oral    lactulose (CHRONULAC) 10 GM/15ML solution 10 g 10 g 2 Times Daily 2/13/2018     Sig - Route: Take 15 mL by mouth 2 (Two) Times a Day. - Oral    levothyroxine (SYNTHROID, LEVOTHROID) tablet 50 mcg 50 mcg Every Early Morning 2/13/2018     Sig - Route: Take 1 tablet by mouth Every Morning. - Oral    megestrol (MEGACE) 40 MG/ML suspension 200 mg 200 mg 2  Times Daily 2/13/2018     Sig - Route: Take 5 mL by mouth 2 (Two) Times a Day. - Oral    meropenem (MERREM) 1 g/100 mL 0.9% NS VTB (mbp) 1 g Once 2/13/2018 2/13/2018    Sig - Route: Infuse 100 mL into a venous catheter 1 (One) Time. - Intravenous    meropenem (MERREM) 500mg/100 mL 0.9% NS IVPB (mbp) 500 mg Every 8 Hours 2/13/2018 2/20/2018    Sig - Route: Infuse 100 mL into a venous catheter Every 8 (Eight) Hours. - Intravenous    multivitamin (DAILY DARRELL) tablet 1 tablet 1 tablet Daily 2/13/2018     Sig - Route: Take 1 tablet by mouth Daily. - Oral    nitroglycerin (NITROSTAT) SL tablet 0.4 mg 0.4 mg Every 5 Minutes PRN 2/12/2018     Sig - Route: Place 1 tablet under the tongue Every 5 (Five) Minutes As Needed for Chest Pain (if systolic BP greater than 100 mm/Hg.). - Sublingual    OXcarbazepine (TRILEPTAL) tablet 150 mg 150 mg Every 12 Hours Scheduled 2/13/2018     Sig - Route: Take 0.5 tablets by mouth Every 12 (Twelve) Hours. - Oral    oxyCODONE (ROXICODONE) immediate release tablet 5 mg 5 mg Every 4 Hours PRN 2/13/2018     Sig - Route: Take 1 tablet by mouth Every 4 (Four) Hours As Needed for Moderate Pain . - Oral    pantoprazole (PROTONIX) EC tablet 40 mg 40 mg 2 Times Daily 2/13/2018     Sig - Route: Take 1 tablet by mouth 2 (Two) Times a Day. - Oral    piperacillin-tazobactam (ZOSYN) 4.5 g/100 mL 0.9% NS IVPB (mbp) 4.5 g Once 2/12/2018 2/12/2018    Sig - Route: Infuse 100 mL into a venous catheter 1 (One) Time. - Intravenous    sodium chloride 0.9 % bolus 500 mL 500 mL Once 2/12/2018 2/12/2018    Sig - Route: Infuse 500 mL into a venous catheter 1 (One) Time. - Intravenous    sodium chloride 0.9 % flush 1-10 mL 1-10 mL As Needed 2/12/2018     Sig - Route: Infuse 1-10 mL into a venous catheter As Needed for Line Care. - Intravenous    vancomycin (VANCOCIN) 1,250 mg in sodium chloride 0.9 % 250 mL IVPB 20 mg/kg × 59 kg Once 2/12/2018 2/13/2018    Sig - Route: Infuse 1,250 mg into a venous catheter 1 (One)  Time. - Intravenous    aspirin EC tablet 81 mg (Discontinued) 81 mg Daily 2/13/2018 2/13/2018    Sig - Route: Take 1 tablet by mouth Daily. - Oral    Reason for Discontinue: Discontinued by another clinician    cefTRIAXone (ROCEPHIN) 1 g/100 mL 0.9% NS (MBP) (Discontinued) 1 g Once 2/12/2018 2/12/2018    Sig - Route: Infuse 100 mL into a venous catheter 1 (One) Time. - Intravenous    diltiaZEM CD (CARDIZEM CD) 24 hr capsule 180 mg (Discontinued) 180 mg Every 24 Hours Scheduled 2/13/2018 2/13/2018    Sig - Route: Take 1 capsule by mouth Daily. - Oral    Reason for Discontinue: Discontinued by another clinician    meropenem (MERREM) 1 g/100 mL 0.9% NS VTB (mbp) (Discontinued) 1 g Every 8 Hours 2/13/2018 2/13/2018    Sig - Route: Infuse 100 mL into a venous catheter Every 8 (Eight) Hours. - Intravenous    Reason for Discontinue: Dose adjustment              Assessment/Plan     ASSESSMENT:    1. Bacteremia  2.  Acute pyelonephritis    PLAN:    Patient presented to [facility] on 2/12/2018 for admission due to positive blood cultures obtained on 2/10/18.  Her visit to the ER on 2/10/18 was concerning for UTI as well as patient received Rocephin and sent back to the nursing home where she followed up at the nursing home with Zosyn.    With history of prior ESBL infection, would recommend to continue meropenem monotherapy for now.    Hope to de-escalate antibiotic therapy when susceptibilities are available.    If urine culture shows no evidence of ESBL, would recommend to DC isolation after CHD bath.    We will continue to follow closely.    Patient's findings and recommendations were discussed with patient, nursing staff, primary care team and consulting provider    Code Status: Conditional Code     Scribed for ANICETO Victoria  02/13/18  10:37 AM     Electronically signed by ANICETO Pool at 2/13/2018 10:46 AM        Nutrition Notes (most recent note)     No  notes of this type exist for this encounter.        Physical Therapy Notes (most recent note)     No notes of this type exist for this encounter.        Occupational Therapy Notes (most recent note)     No notes of this type exist for this encounter.        Speech Language Pathology Notes (most recent note)     No notes of this type exist for this encounter.        Respiratory Therapy Notes (most recent note)     No notes of this type exist for this encounter.            Discharge Summary     No notes of this type exist for this encounter.        Discharge Order     Start     Ordered    02/16/18 1102  Discharge patient  Once     Comments:  Patient needs ambulance transport   Expected Discharge Date:  02/16/18    Discharge Disposition:  Skilled Nursing Facility (DC - External)        02/16/18 1104

## 2018-02-16 NOTE — PLAN OF CARE
Problem: Infection, Risk/Actual (Adult)  Goal: Identify Related Risk Factors and Signs and Symptoms  Outcome: Ongoing (interventions implemented as appropriate)

## 2018-02-16 NOTE — PLAN OF CARE
Problem: Infection, Risk/Actual (Adult)  Goal: Identify Related Risk Factors and Signs and Symptoms  Outcome: Ongoing (interventions implemented as appropriate)    Goal: Infection Prevention/Resolution  Outcome: Ongoing (interventions implemented as appropriate)      Problem: Skin Integrity Impairment, Risk/Actual (Adult)  Goal: Identify Related Risk Factors and Signs and Symptoms  Outcome: Ongoing (interventions implemented as appropriate)    Goal: Skin Integrity/Wound Healing  Outcome: Ongoing (interventions implemented as appropriate)      Problem: Pain, Chronic (Adult)  Goal: Identify Related Risk Factors and Signs and Symptoms  Outcome: Ongoing (interventions implemented as appropriate)    Goal: Acceptable Pain Control/Comfort Level  Outcome: Ongoing (interventions implemented as appropriate)      Problem: Patient Care Overview (Adult)  Goal: Plan of Care Review  Outcome: Ongoing (interventions implemented as appropriate)    Goal: Adult Individualization and Mutuality  Outcome: Ongoing (interventions implemented as appropriate)      Problem: Fall Risk (Adult)  Goal: Identify Related Risk Factors and Signs and Symptoms  Outcome: Ongoing (interventions implemented as appropriate)

## 2018-02-16 NOTE — PROGRESS NOTES
Discharge Planning Assessment   Fabricio     Patient Name: Lily Ace  MRN: [id number]  Today's Date: 2/16/2018    Admit Date: 2/12/2018       Discharge Plan       02/16/18 0462    Final Note    Final Note Pt is being discharged back to [facility]. SS contacted [facility] per Josie and pt has a bed available. SS faxed information including AVS to [facility]. Nurse to call report to [facility]. SS contacted pt's mother, Ivonne Dominguez at [phone] who is agreeable for pt to be discharged back to [facility] today. SS completed [facility] and will contact EMS when pt is ready for transport. Will follow.     12:33 SS contacted [facility] per Rigoberto. No other needs identified.         Discharge Placement     Facility/Agency Request Status Selected? Address Phone Number Fax Number    [facility] Accepted     [address] [place] [address] [phone]        Expected Discharge Date and Time     Expected Discharge Date Expected Discharge Time    Feb 16, 2018           Mary Jane Ragland

## 2018-02-17 LAB
BACTERIA SPEC AEROBE CULT: NORMAL
BACTERIA SPEC AEROBE CULT: NORMAL

## 2018-02-22 ENCOUNTER — LAB REQUISITION (OUTPATIENT)
Dept: LAB | Facility: HOSPITAL | Age: 63
End: 2018-02-22

## 2018-02-22 DIAGNOSIS — E87.5 HYPERKALEMIA: ICD-10-CM

## 2018-02-22 DIAGNOSIS — K74.60 CIRRHOSIS OF LIVER (HCC): ICD-10-CM

## 2018-02-22 LAB
AMMONIA BLD-SCNC: 27 UMOL/L (ref 11–51)
POTASSIUM BLD-SCNC: 4.4 MMOL/L (ref 3.5–5.3)

## 2018-02-22 PROCEDURE — 84132 ASSAY OF SERUM POTASSIUM: CPT | Performed by: INTERNAL MEDICINE

## 2018-02-22 PROCEDURE — 82140 ASSAY OF AMMONIA: CPT | Performed by: INTERNAL MEDICINE

## 2018-03-01 ENCOUNTER — LAB REQUISITION (OUTPATIENT)
Dept: LAB | Facility: HOSPITAL | Age: 63
End: 2018-03-01

## 2018-03-01 DIAGNOSIS — D64.9 ANEMIA: ICD-10-CM

## 2018-03-01 DIAGNOSIS — K74.60 CIRRHOSIS OF LIVER (HCC): ICD-10-CM

## 2018-03-01 LAB
AMMONIA BLD-SCNC: 21 UMOL/L (ref 11–51)
AMMONIA BLD-SCNC: 26 UMOL/L (ref 11–51)

## 2018-03-01 PROCEDURE — 82140 ASSAY OF AMMONIA: CPT | Performed by: INTERNAL MEDICINE

## 2018-03-08 ENCOUNTER — LAB REQUISITION (OUTPATIENT)
Dept: LAB | Facility: HOSPITAL | Age: 63
End: 2018-03-08

## 2018-03-08 DIAGNOSIS — D64.9 ANEMIA: ICD-10-CM

## 2018-03-08 LAB — AMMONIA BLD-SCNC: 23 UMOL/L (ref 11–51)

## 2018-03-08 PROCEDURE — 82140 ASSAY OF AMMONIA: CPT | Performed by: INTERNAL MEDICINE

## 2018-03-13 ENCOUNTER — LAB REQUISITION (OUTPATIENT)
Dept: LAB | Facility: HOSPITAL | Age: 63
End: 2018-03-13

## 2018-03-13 DIAGNOSIS — K72.90 HEPATIC FAILURE, WITHOUT COMA (HCC): ICD-10-CM

## 2018-03-13 LAB — AMMONIA BLD-SCNC: 19 UMOL/L (ref 11–51)

## 2018-03-13 PROCEDURE — 82140 ASSAY OF AMMONIA: CPT | Performed by: INTERNAL MEDICINE

## 2018-03-14 ENCOUNTER — LAB REQUISITION (OUTPATIENT)
Dept: LAB | Facility: HOSPITAL | Age: 63
End: 2018-03-14

## 2018-03-14 DIAGNOSIS — R53.81 OTHER MALAISE: ICD-10-CM

## 2018-03-14 LAB
ALBUMIN SERPL-MCNC: 3.3 G/DL (ref 3.4–4.8)
ALBUMIN/GLOB SERPL: 0.9 G/DL (ref 1.5–2.5)
ALP SERPL-CCNC: 90 U/L (ref 35–104)
ALT SERPL W P-5'-P-CCNC: 29 U/L (ref 10–36)
ANION GAP SERPL CALCULATED.3IONS-SCNC: 3.6 MMOL/L (ref 3.6–11.2)
AST SERPL-CCNC: 35 U/L (ref 10–30)
BASOPHILS # BLD AUTO: 0.02 10*3/MM3 (ref 0–0.3)
BASOPHILS NFR BLD AUTO: 0.3 % (ref 0–2)
BILIRUB SERPL-MCNC: 0.2 MG/DL (ref 0.2–1.8)
BUN BLD-MCNC: 40 MG/DL (ref 7–21)
BUN/CREAT SERPL: 27 (ref 7–25)
CALCIUM SPEC-SCNC: 8.8 MG/DL (ref 7.7–10)
CHLORIDE SERPL-SCNC: 111 MMOL/L (ref 99–112)
CO2 SERPL-SCNC: 21.4 MMOL/L (ref 24.3–31.9)
CREAT BLD-MCNC: 1.48 MG/DL (ref 0.43–1.29)
DEPRECATED RDW RBC AUTO: 43.2 FL (ref 37–54)
EOSINOPHIL # BLD AUTO: 0.42 10*3/MM3 (ref 0–0.7)
EOSINOPHIL NFR BLD AUTO: 6.6 % (ref 0–5)
ERYTHROCYTE [DISTWIDTH] IN BLOOD BY AUTOMATED COUNT: 13.6 % (ref 11.5–14.5)
GFR SERPL CREATININE-BSD FRML MDRD: 36 ML/MIN/1.73
GLOBULIN UR ELPH-MCNC: 3.7 GM/DL
GLUCOSE BLD-MCNC: 181 MG/DL (ref 70–110)
HCT VFR BLD AUTO: 31.1 % (ref 37–47)
HGB BLD-MCNC: 10.3 G/DL (ref 12–16)
IMM GRANULOCYTES # BLD: 0.05 10*3/MM3 (ref 0–0.03)
IMM GRANULOCYTES NFR BLD: 0.8 % (ref 0–0.5)
LYMPHOCYTES # BLD AUTO: 0.9 10*3/MM3 (ref 1–3)
LYMPHOCYTES NFR BLD AUTO: 14.2 % (ref 21–51)
MCH RBC QN AUTO: 29.9 PG (ref 27–33)
MCHC RBC AUTO-ENTMCNC: 33.1 G/DL (ref 33–37)
MCV RBC AUTO: 90.1 FL (ref 80–94)
MONOCYTES # BLD AUTO: 0.59 10*3/MM3 (ref 0.1–0.9)
MONOCYTES NFR BLD AUTO: 9.3 % (ref 0–10)
NEUTROPHILS # BLD AUTO: 4.38 10*3/MM3 (ref 1.4–6.5)
NEUTROPHILS NFR BLD AUTO: 68.8 % (ref 30–70)
OSMOLALITY SERPL CALC.SUM OF ELEC: 286.3 MOSM/KG (ref 273–305)
PLATELET # BLD AUTO: 127 10*3/MM3 (ref 130–400)
PMV BLD AUTO: 10.9 FL (ref 6–10)
POTASSIUM BLD-SCNC: 6.5 MMOL/L (ref 3.5–5.3)
PROT SERPL-MCNC: 7 G/DL (ref 6–8)
RBC # BLD AUTO: 3.45 10*6/MM3 (ref 4.2–5.4)
SODIUM BLD-SCNC: 136 MMOL/L (ref 135–153)
WBC NRBC COR # BLD: 6.36 10*3/MM3 (ref 4.5–12.5)

## 2018-03-14 PROCEDURE — 80053 COMPREHEN METABOLIC PANEL: CPT | Performed by: INTERNAL MEDICINE

## 2018-03-14 PROCEDURE — 85025 COMPLETE CBC W/AUTO DIFF WBC: CPT | Performed by: INTERNAL MEDICINE

## 2018-03-15 ENCOUNTER — LAB REQUISITION (OUTPATIENT)
Dept: LAB | Facility: HOSPITAL | Age: 63
End: 2018-03-15

## 2018-03-15 DIAGNOSIS — E87.5 HYPERKALEMIA: ICD-10-CM

## 2018-03-15 LAB — POTASSIUM BLD-SCNC: 4.7 MMOL/L (ref 3.5–5.3)

## 2018-03-15 PROCEDURE — 84132 ASSAY OF SERUM POTASSIUM: CPT | Performed by: INTERNAL MEDICINE

## 2018-03-17 ENCOUNTER — LAB REQUISITION (OUTPATIENT)
Dept: LAB | Facility: HOSPITAL | Age: 63
End: 2018-03-17

## 2018-03-17 DIAGNOSIS — M62.81 MUSCLE WEAKNESS (GENERALIZED): ICD-10-CM

## 2018-03-17 LAB
ANION GAP SERPL CALCULATED.3IONS-SCNC: 4.7 MMOL/L (ref 3.6–11.2)
BUN BLD-MCNC: 32 MG/DL (ref 7–21)
BUN/CREAT SERPL: 26.2 (ref 7–25)
CALCIUM SPEC-SCNC: 8.2 MG/DL (ref 7.7–10)
CHLORIDE SERPL-SCNC: 110 MMOL/L (ref 99–112)
CO2 SERPL-SCNC: 21.3 MMOL/L (ref 24.3–31.9)
CREAT BLD-MCNC: 1.22 MG/DL (ref 0.43–1.29)
GFR SERPL CREATININE-BSD FRML MDRD: 45 ML/MIN/1.73
GLUCOSE BLD-MCNC: 68 MG/DL (ref 70–110)
OSMOLALITY SERPL CALC.SUM OF ELEC: 277.2 MOSM/KG (ref 273–305)
POTASSIUM BLD-SCNC: 4.9 MMOL/L (ref 3.5–5.3)
SODIUM BLD-SCNC: 136 MMOL/L (ref 135–153)

## 2018-03-17 PROCEDURE — 80048 BASIC METABOLIC PNL TOTAL CA: CPT

## 2018-03-17 PROCEDURE — 80048 BASIC METABOLIC PNL TOTAL CA: CPT | Performed by: INTERNAL MEDICINE

## 2018-03-30 ENCOUNTER — LAB REQUISITION (OUTPATIENT)
Dept: LAB | Facility: HOSPITAL | Age: 63
End: 2018-03-30

## 2018-03-30 DIAGNOSIS — M62.81 MUSCLE WEAKNESS (GENERALIZED): ICD-10-CM

## 2018-03-30 DIAGNOSIS — K72.90 HEPATIC FAILURE, WITHOUT COMA (HCC): ICD-10-CM

## 2018-03-30 LAB — AMMONIA BLD-SCNC: 28 UMOL/L (ref 11–51)

## 2018-03-30 PROCEDURE — 82140 ASSAY OF AMMONIA: CPT | Performed by: INTERNAL MEDICINE

## 2018-04-06 ENCOUNTER — LAB REQUISITION (OUTPATIENT)
Dept: LAB | Facility: HOSPITAL | Age: 63
End: 2018-04-06

## 2018-04-06 DIAGNOSIS — K72.90 HEPATIC FAILURE, WITHOUT COMA (HCC): ICD-10-CM

## 2018-04-06 LAB — AMMONIA BLD-SCNC: 28 UMOL/L (ref 11–51)

## 2018-04-06 PROCEDURE — 82140 ASSAY OF AMMONIA: CPT | Performed by: INTERNAL MEDICINE

## 2018-04-09 ENCOUNTER — LAB REQUISITION (OUTPATIENT)
Dept: LAB | Facility: HOSPITAL | Age: 63
End: 2018-04-09

## 2018-04-09 DIAGNOSIS — K74.60 CIRRHOSIS OF LIVER (HCC): ICD-10-CM

## 2018-04-09 LAB — AMMONIA BLD-SCNC: 14 UMOL/L (ref 11–51)

## 2018-04-09 PROCEDURE — 82140 ASSAY OF AMMONIA: CPT | Performed by: INTERNAL MEDICINE

## 2018-04-18 ENCOUNTER — LAB REQUISITION (OUTPATIENT)
Dept: LAB | Facility: HOSPITAL | Age: 63
End: 2018-04-18

## 2018-04-18 DIAGNOSIS — K74.60 CIRRHOSIS OF LIVER (HCC): ICD-10-CM

## 2018-04-18 LAB
AMMONIA BLD-SCNC: 38 UMOL/L (ref 11–51)
BASOPHILS # BLD AUTO: 0.03 10*3/MM3 (ref 0–0.3)
BASOPHILS NFR BLD AUTO: 0.3 % (ref 0–2)
DEPRECATED RDW RBC AUTO: 45.7 FL (ref 37–54)
EOSINOPHIL # BLD AUTO: 0.81 10*3/MM3 (ref 0–0.7)
EOSINOPHIL NFR BLD AUTO: 9.3 % (ref 0–5)
ERYTHROCYTE [DISTWIDTH] IN BLOOD BY AUTOMATED COUNT: 15 % (ref 11.5–14.5)
HCT VFR BLD AUTO: 26.2 % (ref 37–47)
HGB BLD-MCNC: 8.6 G/DL (ref 12–16)
IMM GRANULOCYTES # BLD: 0.06 10*3/MM3 (ref 0–0.03)
IMM GRANULOCYTES NFR BLD: 0.7 % (ref 0–0.5)
LYMPHOCYTES # BLD AUTO: 1.69 10*3/MM3 (ref 1–3)
LYMPHOCYTES NFR BLD AUTO: 19.4 % (ref 21–51)
MCH RBC QN AUTO: 28.4 PG (ref 27–33)
MCHC RBC AUTO-ENTMCNC: 32.8 G/DL (ref 33–37)
MCV RBC AUTO: 86.5 FL (ref 80–94)
MONOCYTES # BLD AUTO: 0.87 10*3/MM3 (ref 0.1–0.9)
MONOCYTES NFR BLD AUTO: 10 % (ref 0–10)
NEUTROPHILS # BLD AUTO: 5.26 10*3/MM3 (ref 1.4–6.5)
NEUTROPHILS NFR BLD AUTO: 60.3 % (ref 30–70)
PLATELET # BLD AUTO: 161 10*3/MM3 (ref 130–400)
PMV BLD AUTO: 10.2 FL (ref 6–10)
RBC # BLD AUTO: 3.03 10*6/MM3 (ref 4.2–5.4)
WBC NRBC COR # BLD: 8.72 10*3/MM3 (ref 4.5–12.5)

## 2018-04-18 PROCEDURE — 85025 COMPLETE CBC W/AUTO DIFF WBC: CPT | Performed by: INTERNAL MEDICINE

## 2018-04-18 PROCEDURE — 82140 ASSAY OF AMMONIA: CPT | Performed by: INTERNAL MEDICINE

## 2018-04-23 ENCOUNTER — HOSPITAL ENCOUNTER (EMERGENCY)
Facility: HOSPITAL | Age: 63
Discharge: HOME | End: 2018-04-24
Attending: FAMILY MEDICINE | Admitting: FAMILY MEDICINE

## 2018-04-23 ENCOUNTER — APPOINTMENT (OUTPATIENT)
Dept: CT IMAGING | Facility: HOSPITAL | Age: 63
End: 2018-04-23

## 2018-04-23 DIAGNOSIS — N39.0 URINARY TRACT INFECTION WITHOUT HEMATURIA, SITE UNSPECIFIED: Primary | ICD-10-CM

## 2018-04-23 DIAGNOSIS — R44.1 HALLUCINATION, VISUAL: ICD-10-CM

## 2018-04-23 LAB
ALBUMIN SERPL-MCNC: 3 G/DL (ref 3.4–4.8)
ALBUMIN/GLOB SERPL: 0.9 G/DL (ref 1.5–2.5)
ALP SERPL-CCNC: 75 U/L (ref 35–104)
ALT SERPL W P-5'-P-CCNC: 21 U/L (ref 10–36)
AMMONIA BLD-SCNC: 12 UMOL/L (ref 11–51)
ANION GAP SERPL CALCULATED.3IONS-SCNC: 7.2 MMOL/L (ref 3.6–11.2)
APTT PPP: 32.7 SECONDS (ref 23.8–36.1)
AST SERPL-CCNC: 31 U/L (ref 10–30)
BACTERIA UR QL AUTO: ABNORMAL /HPF
BASOPHILS # BLD AUTO: 0.02 10*3/MM3 (ref 0–0.3)
BASOPHILS NFR BLD AUTO: 0.2 % (ref 0–2)
BILIRUB SERPL-MCNC: 0.2 MG/DL (ref 0.2–1.8)
BILIRUB UR QL STRIP: NEGATIVE
BUN BLD-MCNC: 30 MG/DL (ref 7–21)
BUN/CREAT SERPL: 22.7 (ref 7–25)
CALCIUM SPEC-SCNC: 8.8 MG/DL (ref 7.7–10)
CHLORIDE SERPL-SCNC: 111 MMOL/L (ref 99–112)
CLARITY UR: ABNORMAL
CO2 SERPL-SCNC: 22.8 MMOL/L (ref 24.3–31.9)
COLOR UR: YELLOW
CREAT BLD-MCNC: 1.32 MG/DL (ref 0.43–1.29)
CRP SERPL-MCNC: <0.5 MG/DL (ref 0–0.99)
D-LACTATE SERPL-SCNC: 0.5 MMOL/L (ref 0.5–2)
DEPRECATED RDW RBC AUTO: 47.2 FL (ref 37–54)
EOSINOPHIL # BLD AUTO: 0.58 10*3/MM3 (ref 0–0.7)
EOSINOPHIL NFR BLD AUTO: 6.9 % (ref 0–5)
ERYTHROCYTE [DISTWIDTH] IN BLOOD BY AUTOMATED COUNT: 15.3 % (ref 11.5–14.5)
ETHANOL BLD-MCNC: <10 MG/DL
ETHANOL UR QL: <0.01 %
GFR SERPL CREATININE-BSD FRML MDRD: 41 ML/MIN/1.73
GLOBULIN UR ELPH-MCNC: 3.5 GM/DL
GLUCOSE BLD-MCNC: 69 MG/DL (ref 70–110)
GLUCOSE UR STRIP-MCNC: NEGATIVE MG/DL
HCT VFR BLD AUTO: 26.9 % (ref 37–47)
HGB BLD-MCNC: 8.8 G/DL (ref 12–16)
HGB UR QL STRIP.AUTO: NEGATIVE
HYALINE CASTS UR QL AUTO: ABNORMAL /LPF
IMM GRANULOCYTES # BLD: 0.03 10*3/MM3 (ref 0–0.03)
IMM GRANULOCYTES NFR BLD: 0.4 % (ref 0–0.5)
INR PPP: 1.03 (ref 0.9–1.1)
KETONES UR QL STRIP: NEGATIVE
LEUKOCYTE ESTERASE UR QL STRIP.AUTO: ABNORMAL
LYMPHOCYTES # BLD AUTO: 1.33 10*3/MM3 (ref 1–3)
LYMPHOCYTES NFR BLD AUTO: 15.9 % (ref 21–51)
MCH RBC QN AUTO: 28.6 PG (ref 27–33)
MCHC RBC AUTO-ENTMCNC: 32.7 G/DL (ref 33–37)
MCV RBC AUTO: 87.3 FL (ref 80–94)
MONOCYTES # BLD AUTO: 0.7 10*3/MM3 (ref 0.1–0.9)
MONOCYTES NFR BLD AUTO: 8.3 % (ref 0–10)
NEUTROPHILS # BLD AUTO: 5.73 10*3/MM3 (ref 1.4–6.5)
NEUTROPHILS NFR BLD AUTO: 68.3 % (ref 30–70)
NITRITE UR QL STRIP: NEGATIVE
OSMOLALITY SERPL CALC.SUM OF ELEC: 285.8 MOSM/KG (ref 273–305)
PH UR STRIP.AUTO: 5.5 [PH] (ref 5–8)
PLATELET # BLD AUTO: 124 10*3/MM3 (ref 130–400)
PMV BLD AUTO: 9.2 FL (ref 6–10)
POTASSIUM BLD-SCNC: 4.4 MMOL/L (ref 3.5–5.3)
PROT SERPL-MCNC: 6.5 G/DL (ref 6–8)
PROT UR QL STRIP: ABNORMAL
PROTHROMBIN TIME: 13.6 SECONDS (ref 11–15.4)
RBC # BLD AUTO: 3.08 10*6/MM3 (ref 4.2–5.4)
RBC # UR: ABNORMAL /HPF
REF LAB TEST METHOD: ABNORMAL
SODIUM BLD-SCNC: 141 MMOL/L (ref 135–153)
SP GR UR STRIP: 1.01 (ref 1–1.03)
SQUAMOUS #/AREA URNS HPF: ABNORMAL /HPF
UROBILINOGEN UR QL STRIP: ABNORMAL
WBC NRBC COR # BLD: 8.39 10*3/MM3 (ref 4.5–12.5)
WBC UR QL AUTO: ABNORMAL /HPF

## 2018-04-23 PROCEDURE — 87086 URINE CULTURE/COLONY COUNT: CPT | Performed by: FAMILY MEDICINE

## 2018-04-23 PROCEDURE — 80307 DRUG TEST PRSMV CHEM ANLYZR: CPT | Performed by: FAMILY MEDICINE

## 2018-04-23 PROCEDURE — 70450 CT HEAD/BRAIN W/O DYE: CPT | Performed by: RADIOLOGY

## 2018-04-23 PROCEDURE — 87186 SC STD MICRODIL/AGAR DIL: CPT | Performed by: FAMILY MEDICINE

## 2018-04-23 PROCEDURE — 86140 C-REACTIVE PROTEIN: CPT | Performed by: FAMILY MEDICINE

## 2018-04-23 PROCEDURE — 36415 COLL VENOUS BLD VENIPUNCTURE: CPT

## 2018-04-23 PROCEDURE — 99285 EMERGENCY DEPT VISIT HI MDM: CPT

## 2018-04-23 PROCEDURE — 83605 ASSAY OF LACTIC ACID: CPT | Performed by: FAMILY MEDICINE

## 2018-04-23 PROCEDURE — 70450 CT HEAD/BRAIN W/O DYE: CPT

## 2018-04-23 PROCEDURE — 85610 PROTHROMBIN TIME: CPT | Performed by: FAMILY MEDICINE

## 2018-04-23 PROCEDURE — 85730 THROMBOPLASTIN TIME PARTIAL: CPT | Performed by: FAMILY MEDICINE

## 2018-04-23 PROCEDURE — 81001 URINALYSIS AUTO W/SCOPE: CPT | Performed by: FAMILY MEDICINE

## 2018-04-23 PROCEDURE — 87040 BLOOD CULTURE FOR BACTERIA: CPT | Performed by: FAMILY MEDICINE

## 2018-04-23 PROCEDURE — 82140 ASSAY OF AMMONIA: CPT | Performed by: FAMILY MEDICINE

## 2018-04-23 PROCEDURE — 80053 COMPREHEN METABOLIC PANEL: CPT | Performed by: FAMILY MEDICINE

## 2018-04-23 PROCEDURE — 87077 CULTURE AEROBIC IDENTIFY: CPT | Performed by: FAMILY MEDICINE

## 2018-04-23 PROCEDURE — 85025 COMPLETE CBC W/AUTO DIFF WBC: CPT | Performed by: FAMILY MEDICINE

## 2018-04-24 VITALS
HEIGHT: 66 IN | DIASTOLIC BLOOD PRESSURE: 64 MMHG | WEIGHT: 126 LBS | SYSTOLIC BLOOD PRESSURE: 128 MMHG | RESPIRATION RATE: 16 BRPM | OXYGEN SATURATION: 100 % | HEART RATE: 74 BPM | BODY MASS INDEX: 20.25 KG/M2 | TEMPERATURE: 98.1 F

## 2018-04-24 LAB
6-ACETYL MORPHINE: NEGATIVE
AMPHET+METHAMPHET UR QL: NEGATIVE
BARBITURATES UR QL SCN: NEGATIVE
BENZODIAZ UR QL SCN: NEGATIVE
BUPRENORPHINE SERPL-MCNC: NEGATIVE NG/ML
CANNABINOIDS SERPL QL: NEGATIVE
COCAINE UR QL: NEGATIVE
METHADONE UR QL SCN: NEGATIVE
OPIATES UR QL: NEGATIVE
OXYCODONE UR QL SCN: NEGATIVE
PCP UR QL SCN: NEGATIVE

## 2018-04-24 PROCEDURE — 25010000002 PIPERACILLIN-TAZOBACTAM: Performed by: FAMILY MEDICINE

## 2018-04-24 PROCEDURE — 96365 THER/PROPH/DIAG IV INF INIT: CPT

## 2018-04-24 RX ORDER — NITROFURANTOIN 25; 75 MG/1; MG/1
100 CAPSULE ORAL 2 TIMES DAILY
Qty: 14 CAPSULE | Refills: 0 | Status: SHIPPED | OUTPATIENT
Start: 2018-04-24 | End: 2018-05-01

## 2018-04-24 RX ORDER — SODIUM CHLORIDE 0.9 % (FLUSH) 0.9 %
10 SYRINGE (ML) INJECTION AS NEEDED
Status: DISCONTINUED | OUTPATIENT
Start: 2018-04-24 | End: 2018-04-24 | Stop reason: HOSPADM

## 2018-04-24 RX ADMIN — TAZOBACTAM SODIUM AND PIPERACILLIN SODIUM 4.5 G: .5; 4 INJECTION, POWDER, LYOPHILIZED, FOR SOLUTION INTRAVENOUS at 00:57
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
## 2018-04-24 NOTE — ED NOTES
I went to get blood work on the patient but she is gone to radiology at this time.     Shahzad Sylvester  04/23/18 2858

## 2018-04-24 NOTE — NURSING NOTE
Presented clinicals to Dr Breen. She stated that the pt sounds safe to return to nursing home tonight without psych admission. She recommends that her PCP is made aware of these complaints in the morning, and that nursing home should have her seek outpatient psych treatment if that is not successful. RBVOx2. ED staff aware of plans.

## 2018-04-24 NOTE — ED NOTES
Called [facility] for transport back to [facility], dispatch stated he will page it out, no ETA given and PCS requested     Anny Shields  04/24/18 9922

## 2018-04-24 NOTE — ED NOTES
Patient reminded about urine sample that has been ordered by the provider. Patient reports that she does not have to urinate at this time.      Angelina Rayo RN  04/23/18 3452
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
## 2018-04-26 LAB
BACTERIA SPEC AEROBE CULT: ABNORMAL
BACTERIA SPEC AEROBE CULT: ABNORMAL

## 2018-04-28 LAB
BACTERIA SPEC AEROBE CULT: NORMAL
BACTERIA SPEC AEROBE CULT: NORMAL

## 2018-05-07 ENCOUNTER — TRANSCRIBE ORDERS (OUTPATIENT)
Dept: ADMINISTRATIVE | Facility: HOSPITAL | Age: 63
End: 2018-05-07

## 2018-05-07 DIAGNOSIS — R91.1 LUNG NODULE: Primary | ICD-10-CM

## 2018-05-08 ENCOUNTER — LAB REQUISITION (OUTPATIENT)
Dept: LAB | Facility: HOSPITAL | Age: 63
End: 2018-05-08

## 2018-05-08 DIAGNOSIS — K76.9 LIVER DISEASE: ICD-10-CM

## 2018-05-08 LAB — AMMONIA BLD-SCNC: 18 UMOL/L (ref 11–51)

## 2018-05-08 PROCEDURE — 82140 ASSAY OF AMMONIA: CPT | Performed by: INTERNAL MEDICINE

## 2018-05-15 ENCOUNTER — LAB REQUISITION (OUTPATIENT)
Dept: LAB | Facility: HOSPITAL | Age: 63
End: 2018-05-15

## 2018-05-15 DIAGNOSIS — E03.8 OTHER SPECIFIED HYPOTHYROIDISM: ICD-10-CM

## 2018-05-15 DIAGNOSIS — K74.60 CIRRHOSIS OF LIVER (HCC): ICD-10-CM

## 2018-05-15 LAB
AMMONIA BLD-SCNC: 18 UMOL/L (ref 11–51)
T3RU NFR SERPL: 29 % (ref 22.5–37)
T4 SERPL-MCNC: 10.7 MCG/DL (ref 4.5–10.9)
TSH SERPL DL<=0.05 MIU/L-ACNC: 5.06 MIU/ML (ref 0.55–4.78)

## 2018-05-15 PROCEDURE — 82140 ASSAY OF AMMONIA: CPT | Performed by: INTERNAL MEDICINE

## 2018-05-15 PROCEDURE — 84443 ASSAY THYROID STIM HORMONE: CPT | Performed by: INTERNAL MEDICINE

## 2018-05-15 PROCEDURE — 84479 ASSAY OF THYROID (T3 OR T4): CPT | Performed by: INTERNAL MEDICINE

## 2018-05-15 PROCEDURE — 84436 ASSAY OF TOTAL THYROXINE: CPT | Performed by: INTERNAL MEDICINE

## 2018-06-05 ENCOUNTER — LAB REQUISITION (OUTPATIENT)
Dept: LAB | Facility: HOSPITAL | Age: 63
End: 2018-06-05

## 2018-06-05 DIAGNOSIS — K74.69 OTHER CIRRHOSIS OF LIVER (HCC): ICD-10-CM

## 2018-06-05 LAB — AMMONIA BLD-SCNC: 19 UMOL/L (ref 11–51)

## 2018-06-05 PROCEDURE — 82140 ASSAY OF AMMONIA: CPT | Performed by: INTERNAL MEDICINE

## 2018-06-11 ENCOUNTER — LAB REQUISITION (OUTPATIENT)
Dept: LAB | Facility: HOSPITAL | Age: 63
End: 2018-06-11

## 2018-06-11 DIAGNOSIS — K21.9 GASTRO-ESOPHAGEAL REFLUX DISEASE WITHOUT ESOPHAGITIS: ICD-10-CM

## 2018-06-11 LAB — AMMONIA BLD-SCNC: 20 UMOL/L (ref 11–51)

## 2018-06-11 PROCEDURE — 82140 ASSAY OF AMMONIA: CPT | Performed by: INTERNAL MEDICINE

## 2018-06-14 ENCOUNTER — LAB REQUISITION (OUTPATIENT)
Dept: LAB | Facility: HOSPITAL | Age: 63
End: 2018-06-14

## 2018-06-14 DIAGNOSIS — R41.82 ALTERED MENTAL STATUS: ICD-10-CM

## 2018-06-14 LAB
BACTERIA UR QL AUTO: ABNORMAL /HPF
BILIRUB UR QL STRIP: NEGATIVE
CLARITY UR: ABNORMAL
COLOR UR: YELLOW
GLUCOSE UR STRIP-MCNC: NEGATIVE MG/DL
HGB UR QL STRIP.AUTO: ABNORMAL
HYALINE CASTS UR QL AUTO: ABNORMAL /LPF
KETONES UR QL STRIP: NEGATIVE
LEUKOCYTE ESTERASE UR QL STRIP.AUTO: ABNORMAL
NITRITE UR QL STRIP: NEGATIVE
PH UR STRIP.AUTO: 5.5 [PH] (ref 5–8)
PROT UR QL STRIP: ABNORMAL
RBC # UR: ABNORMAL /HPF
REF LAB TEST METHOD: ABNORMAL
SP GR UR STRIP: 1.01 (ref 1–1.03)
SQUAMOUS #/AREA URNS HPF: ABNORMAL /HPF
UROBILINOGEN UR QL STRIP: ABNORMAL
WBC UR QL AUTO: ABNORMAL /HPF

## 2018-06-14 PROCEDURE — 87086 URINE CULTURE/COLONY COUNT: CPT | Performed by: INTERNAL MEDICINE

## 2018-06-14 PROCEDURE — 87077 CULTURE AEROBIC IDENTIFY: CPT | Performed by: INTERNAL MEDICINE

## 2018-06-14 PROCEDURE — 87186 SC STD MICRODIL/AGAR DIL: CPT | Performed by: INTERNAL MEDICINE

## 2018-06-14 PROCEDURE — 81001 URINALYSIS AUTO W/SCOPE: CPT | Performed by: INTERNAL MEDICINE

## 2018-06-16 LAB — BACTERIA SPEC AEROBE CULT: ABNORMAL

## 2018-06-18 ENCOUNTER — LAB REQUISITION (OUTPATIENT)
Dept: LAB | Facility: HOSPITAL | Age: 63
End: 2018-06-18

## 2018-06-18 DIAGNOSIS — K74.60 CIRRHOSIS OF LIVER (HCC): ICD-10-CM

## 2018-06-18 LAB — AMMONIA BLD-SCNC: 25 UMOL/L (ref 11–51)

## 2018-06-18 PROCEDURE — 82140 ASSAY OF AMMONIA: CPT | Performed by: INTERNAL MEDICINE

## 2018-06-25 ENCOUNTER — LAB REQUISITION (OUTPATIENT)
Dept: LAB | Facility: HOSPITAL | Age: 63
End: 2018-06-25

## 2018-06-25 DIAGNOSIS — K74.60 CIRRHOSIS OF LIVER (HCC): ICD-10-CM

## 2018-06-25 LAB — AMMONIA BLD-SCNC: 16 UMOL/L (ref 11–51)

## 2018-06-25 PROCEDURE — 82140 ASSAY OF AMMONIA: CPT | Performed by: INTERNAL MEDICINE

## 2018-07-03 ENCOUNTER — LAB REQUISITION (OUTPATIENT)
Dept: LAB | Facility: HOSPITAL | Age: 63
End: 2018-07-03

## 2018-07-03 DIAGNOSIS — K74.60 CIRRHOSIS OF LIVER (HCC): ICD-10-CM

## 2018-07-03 LAB — AMMONIA BLD-SCNC: 35 UMOL/L (ref 11–51)

## 2018-07-03 PROCEDURE — 82140 ASSAY OF AMMONIA: CPT | Performed by: INTERNAL MEDICINE

## 2018-07-25 ENCOUNTER — APPOINTMENT (OUTPATIENT)
Dept: CT IMAGING | Facility: HOSPITAL | Age: 63
End: 2018-07-25
Attending: INTERNAL MEDICINE
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
